# Patient Record
Sex: MALE | Race: WHITE | NOT HISPANIC OR LATINO | Employment: FULL TIME | URBAN - METROPOLITAN AREA
[De-identification: names, ages, dates, MRNs, and addresses within clinical notes are randomized per-mention and may not be internally consistent; named-entity substitution may affect disease eponyms.]

---

## 2017-03-31 LAB — EXTERNAL HIV SCREEN: NORMAL

## 2018-07-13 ENCOUNTER — TRANSCRIBE ORDERS (OUTPATIENT)
Dept: NEUROSURGERY | Facility: CLINIC | Age: 63
End: 2018-07-13

## 2018-07-13 DIAGNOSIS — M54.2 NECK PAIN: Primary | ICD-10-CM

## 2018-07-27 ENCOUNTER — OFFICE VISIT (OUTPATIENT)
Dept: NEUROSURGERY | Facility: CLINIC | Age: 63
End: 2018-07-27
Payer: COMMERCIAL

## 2018-07-27 VITALS
WEIGHT: 127 LBS | DIASTOLIC BLOOD PRESSURE: 97 MMHG | HEART RATE: 58 BPM | BODY MASS INDEX: 18.81 KG/M2 | SYSTOLIC BLOOD PRESSURE: 154 MMHG | TEMPERATURE: 99.5 F | HEIGHT: 69 IN | RESPIRATION RATE: 16 BRPM

## 2018-07-27 DIAGNOSIS — M54.2 NECK PAIN: ICD-10-CM

## 2018-07-27 DIAGNOSIS — G95.20 SPINAL CORD COMPRESSION (HCC): ICD-10-CM

## 2018-07-27 DIAGNOSIS — M47.12 OTHER SPONDYLOSIS WITH MYELOPATHY, CERVICAL REGION: ICD-10-CM

## 2018-07-27 DIAGNOSIS — G95.89 MYELOMALACIA (HCC): Primary | ICD-10-CM

## 2018-07-27 PROCEDURE — 99205 OFFICE O/P NEW HI 60 MIN: CPT | Performed by: NEUROLOGICAL SURGERY

## 2018-07-27 RX ORDER — ASPIRIN 325 MG
325 TABLET ORAL DAILY
COMMUNITY
End: 2018-08-23 | Stop reason: HOSPADM

## 2018-07-27 RX ORDER — ALLOPURINOL 100 MG/1
100 TABLET ORAL EVERY OTHER DAY
COMMUNITY
End: 2021-03-31

## 2018-07-27 RX ORDER — OMEPRAZOLE 20 MG/1
20 CAPSULE, DELAYED RELEASE ORAL AS NEEDED
COMMUNITY
End: 2020-09-16 | Stop reason: SDUPTHER

## 2018-07-27 RX ORDER — SOTALOL HYDROCHLORIDE 120 MG/1
120 TABLET ORAL 2 TIMES DAILY
COMMUNITY
End: 2020-05-29 | Stop reason: SDUPTHER

## 2018-07-27 RX ORDER — LOSARTAN POTASSIUM 100 MG/1
100 TABLET ORAL DAILY
COMMUNITY
End: 2020-05-29 | Stop reason: SDUPTHER

## 2018-07-27 NOTE — PROGRESS NOTES
Neurosurgery Office Note  Aleksey Gallo 66 y o  male MRN: [id number]      Assessment/Plan      Diagnoses and all orders for this visit:    Myelomalacia [facility])    Neck pain  -     Ambulatory referral to Neurosurgery    Spinal cord compression [facility])    Other spondylosis with myelopathy, cervical region    Other orders  -     allopurinol (ZYLOPRIM) 100 mg tablet; Take 100 mg by mouth daily  -     aspirin 325 mg tablet; Take 325 mg by mouth daily  -     losartan (COZAAR) 100 MG tablet; Take 100 mg by mouth daily  -     omeprazole (PriLOSEC) 20 mg delayed release capsule; Take 20 mg by mouth as needed  -     sotalol (BETAPACE) 120 mg tablet; Take 120 mg by mouth 2 (two) times a day      (above all pre-existing medications)    Discussion:    81-year-old male who since April feels he has been having worsening difficulty with his hands  Began with right hand tingling which has progressed, Now involves his left hand  He also has weakness in his arms, and cannot lift his arms/abduct his arms above 90 degrees  He has considerable difficulty with self care etc   His hands are the worst in terms of sensation, this is numbness and tingling, but involves all fingers and the distal arms  He also has great difficulty with his walking, has now progressed using a standard cane  He also mentions bladder difficulty i e  some mild hesitancy and decreased stream strength, nocturia 2 times nightly  Taken together, his mJOA score is 601777 or 9/17  He does not have profound neck pain, rating it 3-4/10  He feels these hand symptoms in particular are worsening quite acutely now  Other metrics, works full-time but stopped this week, arm pain 9-10/10, NDI 22 percent, EQ 5 D3 L to 2232  On exam, the patient has bilateral Salazar's, biceps reflexes are essentially normal, right patellar reflex is 3+, left 2+, plus-minus clonus on the right    Hand strength, interosseous 3/5, deltoids 4 to 4+/5 to 90 degree point, otherwise biceps triceps wrist extensors  all 4+ at least/5  Very unsteady on his feet, relies heavily on the cane, considerable falls risk  MRI scan of the cervical spine personally reviewed as was report  Shows myelomalacia at C3-4, with profound stenosis primarily from a posterior vector  He also is a herniated disc left side C5-6, but he has no profound radicular complaint left side as opposed to myelopathy  There is circumferential compression at the C5-6 disc space  I reviewed with the patient the natural history of cervical myelopathy  I reviewed him options for management  We spent considerable time discussing surgical options  I recommended offered and obtained consent for posterior cervical approach:  Posterior cervical laminectomy/decompression C3-5, superior C6, with fusion C2-C7, possible T1, additional levels as needed  I in detail reviewed with him expected hospital course, typically 3 days, PT evaluations, likely that he will need to be discharged to acute rehab, etc   Our office will connect with him and schedule him for surgery, unless he changes as mind  He takes 325 milligram aspirin for atrial fibrillation (did not want to take anti-coagulation), and this will need to be held for 1 week prior to surgery  He previously followed with Dr Abdulkadir De Los Santos of Cardiology, who retired  He will either need clearance from Dr Tin Weathers, or to be cleared by Cardiology if anesthesia/Dr Tin Weathers recommend this  He has a history of gout, has had no episodes for years  He does take allopurinol, which would also need to be held for surgery  Metrics as above  CHIEF COMPLAINT    Chief Complaint   Patient presents with    Consult     NP Consult for C-spine evaluation;  Referred by Dr Lucien Bowman    History of Present Illness     66y o  year old male     HPI    See Discussion    REVIEW OF SYSTEMS    Review of Systems   Constitutional: Positive for appetite change (not much appetite with pain and depression), fatigue (moderate) and unexpected weight change (lost 10 lbs in 3 months)  HENT: Negative  Eyes: Negative  Respiratory: Negative  Cardiovascular: Negative  Gastrointestinal: Positive for abdominal pain and diarrhea  Endocrine: Negative  Genitourinary: Negative  Musculoskeletal: Positive for arthralgias, back pain, gait problem (unstable balance, weakness in legs, using a standard cane, last fall October 2017), neck pain and neck stiffness  Skin: Positive for rash (dry itchy skin on legs)  Allergic/Immunologic: Negative  Neurological: Positive for weakness (BL upper and lower extremities) and numbness (B/L arms N/T)  Hematological: Bruises/bleeds easily  Psychiatric/Behavioral: Positive for decreased concentration (slight) and sleep disturbance (some)  The patient is nervous/anxious (nervous / depressed about how he feels and being alone)  Meds/Allergies     Current Outpatient Prescriptions   Medication Sig Dispense Refill    allopurinol (ZYLOPRIM) 100 mg tablet Take 100 mg by mouth daily      aspirin 325 mg tablet Take 325 mg by mouth daily      losartan (COZAAR) 100 MG tablet Take 100 mg by mouth daily      omeprazole (PriLOSEC) 20 mg delayed release capsule Take 20 mg by mouth as needed      sotalol (BETAPACE) 120 mg tablet Take 120 mg by mouth 2 (two) times a day       No current facility-administered medications for this visit          No Known Allergies    PAST HISTORY    Past Medical History:   Diagnosis Date    A-fib [facility]) 01/01/2005    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 03/10/2017    Anemia 12/31/2015    Back ache 08/31/2017    Diarrhea 08/31/2017    Diverticula of colon 01/01/2012    Family history of prostate cancer     Father    GERD with esophagitis 08/31/2017    Gout 08/31/2017    Hordeolum externum of lower eyelid 08/31/2017    Hyperlipidemia 05/01/2017    Hypertension, essential 05/01/2017    Hyponatremia 07/10/2014    Loss of taste 05/01/2017    Muscle strain 08/31/2017    Osteoarthritis     Paresthesia 06/08/2018    Raised prostate specific antigen 05/01/2017    Skin lesion 03/08/2018    Spinal stenosis of lumbar region 02/06/2017    Steatosis of liver 08/31/2017    Weight loss, unintentional 08/31/2017       Past Surgical History:   Procedure Laterality Date    CHEST TUBE INSERTION  1980    Insertion and removal / collapsed lung    OTHER SURGICAL HISTORY  03/10/2017    Abscess removed from L5 & S1     Chest tube for was for spontaneous pneumothorax in 1980  Social History   Substance Use Topics    Smoking status: Former Smoker     Types: Cigarettes     Quit date: 1980    Smokeless tobacco: Never Used    Alcohol use Yes      Comment: Beer       Family History   Problem Relation Age of Onset    Prostate cancer Father     Kidney disease Father     Pancreatic cancer Mother          Above history personally reviewed  EXAM    Vitals:Blood pressure 154/97, pulse 58, temperature 99 5 °F (37 5 °C), temperature source Tympanic, resp  rate 16, height 5' 9" (1 753 m), weight 57 6 kg (127 lb)  ,Body mass index is 18 75 kg/m²  Physical Exam   Constitutional: He is oriented to person, place, and time  He appears well-developed  Under weight   HENT:   Head: Normocephalic and atraumatic  Eyes: No scleral icterus  Neck: Neck supple  Excellent range of motion, extension, flexion, rotation  Cardiovascular: Normal rate  Pulmonary/Chest: Effort normal    Abdominal: Soft  Normal appearance  Musculoskeletal: Normal range of motion  Neurological: He is alert and oriented to person, place, and time  No sensory deficit  Reflex Scores:       Bicep reflexes are 1+ on the right side and 1+ on the left side  Patellar reflexes are 3+ on the right side and 2+ on the left side  Skin: Skin is warm and dry  Psychiatric: He has a normal mood and affect   His speech is normal and behavior is normal  Neurologic Exam     Mental Status   Oriented to person, place, and time  Attention: normal    Speech: speech is normal   Level of consciousness: alert    Cranial Nerves     CN VII   Facial expression full, symmetric  Motor Exam   Muscle bulk: normal  Overall muscle tone: normal  See discussion     Gait, Coordination, and Reflexes     Tremor   Resting tremor: absent  Intention tremor: absent  Action tremor: absent    Reflexes   Right biceps: 1+  Left biceps: 1+  Right patellar: 3+  Left patellar: 2+  Right Salazar: present  Left Salazar: present  Right ankle clonus: present  Left ankle clonus: absent  Standard cane         MEDICAL DECISION MAKING    Imaging Studies:     Uploaded MRI scan cervical spine from [facility], done 7/11/18: I have personally reviewed pertinent reports     and I have personally reviewed pertinent films in PACS

## 2018-08-01 PROBLEM — G95.89 MYELOMALACIA (HCC): Status: ACTIVE | Noted: 2018-08-01

## 2018-08-01 PROBLEM — G95.20 SPINAL CORD COMPRESSION (HCC): Status: ACTIVE | Noted: 2018-08-01

## 2018-08-01 PROBLEM — M47.12 OTHER SPONDYLOSIS WITH MYELOPATHY, CERVICAL REGION: Status: ACTIVE | Noted: 2018-08-01

## 2018-08-03 ENCOUNTER — TELEPHONE (OUTPATIENT)
Dept: PREADMISSION TESTING | Facility: HOSPITAL | Age: 63
End: 2018-08-03

## 2018-08-10 ENCOUNTER — APPOINTMENT (OUTPATIENT)
Dept: LAB | Facility: CLINIC | Age: 63
End: 2018-08-10
Payer: COMMERCIAL

## 2018-08-10 ENCOUNTER — TRANSCRIBE ORDERS (OUTPATIENT)
Dept: LAB | Facility: CLINIC | Age: 63
End: 2018-08-10

## 2018-08-10 DIAGNOSIS — M47.12 OTHER SPONDYLOSIS WITH MYELOPATHY, CERVICAL REGION: ICD-10-CM

## 2018-08-10 DIAGNOSIS — G95.89 MYELOMALACIA (HCC): ICD-10-CM

## 2018-08-10 DIAGNOSIS — M54.2 NECK PAIN: ICD-10-CM

## 2018-08-10 DIAGNOSIS — G95.20 SPINAL CORD COMPRESSION (HCC): ICD-10-CM

## 2018-08-10 LAB
ABO GROUP BLD: NORMAL
ALBUMIN SERPL BCP-MCNC: 4 G/DL (ref 3.5–5)
ALP SERPL-CCNC: 87 U/L (ref 46–116)
ALT SERPL W P-5'-P-CCNC: 32 U/L (ref 12–78)
ANION GAP SERPL CALCULATED.3IONS-SCNC: 8 MMOL/L (ref 4–13)
APTT PPP: 28 SECONDS (ref 24–36)
AST SERPL W P-5'-P-CCNC: 30 U/L (ref 5–45)
BASOPHILS # BLD AUTO: 0.05 THOUSANDS/ΜL (ref 0–0.1)
BASOPHILS NFR BLD AUTO: 1 % (ref 0–1)
BILIRUB SERPL-MCNC: 1.06 MG/DL (ref 0.2–1)
BILIRUB UR QL STRIP: NEGATIVE
BLD GP AB SCN SERPL QL: NEGATIVE
BUN SERPL-MCNC: 7 MG/DL (ref 5–25)
CALCIUM SERPL-MCNC: 9.3 MG/DL (ref 8.3–10.1)
CHLORIDE SERPL-SCNC: 92 MMOL/L (ref 100–108)
CLARITY UR: CLEAR
CO2 SERPL-SCNC: 26 MMOL/L (ref 21–32)
COLOR UR: YELLOW
CREAT SERPL-MCNC: 0.73 MG/DL (ref 0.6–1.3)
EOSINOPHIL # BLD AUTO: 0.06 THOUSAND/ΜL (ref 0–0.61)
EOSINOPHIL NFR BLD AUTO: 1 % (ref 0–6)
ERYTHROCYTE [DISTWIDTH] IN BLOOD BY AUTOMATED COUNT: 11.9 % (ref 11.6–15.1)
EST. AVERAGE GLUCOSE BLD GHB EST-MCNC: 91 MG/DL
GFR SERPL CREATININE-BSD FRML MDRD: 100 ML/MIN/1.73SQ M
GLUCOSE P FAST SERPL-MCNC: 111 MG/DL (ref 65–99)
GLUCOSE UR STRIP-MCNC: NEGATIVE MG/DL
HBA1C MFR BLD: 4.8 % (ref 4.2–6.3)
HCT VFR BLD AUTO: 46.4 % (ref 36.5–49.3)
HGB BLD-MCNC: 15.4 G/DL (ref 12–17)
HGB UR QL STRIP.AUTO: NEGATIVE
IMM GRANULOCYTES # BLD AUTO: 0.02 THOUSAND/UL (ref 0–0.2)
IMM GRANULOCYTES NFR BLD AUTO: 0 % (ref 0–2)
INR PPP: 0.97 (ref 0.86–1.17)
KETONES UR STRIP-MCNC: NEGATIVE MG/DL
LEUKOCYTE ESTERASE UR QL STRIP: NEGATIVE
LYMPHOCYTES # BLD AUTO: 1.1 THOUSANDS/ΜL (ref 0.6–4.47)
LYMPHOCYTES NFR BLD AUTO: 17 % (ref 14–44)
MCH RBC QN AUTO: 31.8 PG (ref 26.8–34.3)
MCHC RBC AUTO-ENTMCNC: 33.2 G/DL (ref 31.4–37.4)
MCV RBC AUTO: 96 FL (ref 82–98)
MONOCYTES # BLD AUTO: 0.65 THOUSAND/ΜL (ref 0.17–1.22)
MONOCYTES NFR BLD AUTO: 10 % (ref 4–12)
NEUTROPHILS # BLD AUTO: 4.68 THOUSANDS/ΜL (ref 1.85–7.62)
NEUTS SEG NFR BLD AUTO: 71 % (ref 43–75)
NITRITE UR QL STRIP: NEGATIVE
NRBC BLD AUTO-RTO: 0 /100 WBCS
PH UR STRIP.AUTO: 7 [PH] (ref 4.5–8)
PLATELET # BLD AUTO: 232 THOUSANDS/UL (ref 149–390)
PMV BLD AUTO: 10.8 FL (ref 8.9–12.7)
POTASSIUM SERPL-SCNC: 4.2 MMOL/L (ref 3.5–5.3)
PROT SERPL-MCNC: 7.7 G/DL (ref 6.4–8.2)
PROT UR STRIP-MCNC: NEGATIVE MG/DL
PROTHROMBIN TIME: 13 SECONDS (ref 11.8–14.2)
RBC # BLD AUTO: 4.84 MILLION/UL (ref 3.88–5.62)
RH BLD: POSITIVE
SODIUM SERPL-SCNC: 126 MMOL/L (ref 136–145)
SP GR UR STRIP.AUTO: 1.02 (ref 1–1.03)
SPECIMEN EXPIRATION DATE: NORMAL
UROBILINOGEN UR QL STRIP.AUTO: 0.2 E.U./DL
WBC # BLD AUTO: 6.56 THOUSAND/UL (ref 4.31–10.16)

## 2018-08-10 PROCEDURE — 80053 COMPREHEN METABOLIC PANEL: CPT

## 2018-08-10 PROCEDURE — 83036 HEMOGLOBIN GLYCOSYLATED A1C: CPT

## 2018-08-10 PROCEDURE — 86901 BLOOD TYPING SEROLOGIC RH(D): CPT

## 2018-08-10 PROCEDURE — 86850 RBC ANTIBODY SCREEN: CPT

## 2018-08-10 PROCEDURE — 81003 URINALYSIS AUTO W/O SCOPE: CPT | Performed by: NEUROLOGICAL SURGERY

## 2018-08-10 PROCEDURE — 85730 THROMBOPLASTIN TIME PARTIAL: CPT

## 2018-08-10 PROCEDURE — 85025 COMPLETE CBC W/AUTO DIFF WBC: CPT

## 2018-08-10 PROCEDURE — 36415 COLL VENOUS BLD VENIPUNCTURE: CPT

## 2018-08-10 PROCEDURE — 86900 BLOOD TYPING SEROLOGIC ABO: CPT

## 2018-08-10 PROCEDURE — 85610 PROTHROMBIN TIME: CPT

## 2018-08-15 ENCOUNTER — TELEPHONE (OUTPATIENT)
Dept: NEUROSURGERY | Facility: CLINIC | Age: 63
End: 2018-08-15

## 2018-08-15 ENCOUNTER — DOCUMENTATION (OUTPATIENT)
Dept: NEUROSURGERY | Facility: CLINIC | Age: 63
End: 2018-08-15

## 2018-08-15 ENCOUNTER — ANESTHESIA EVENT (OUTPATIENT)
Dept: PERIOP | Facility: HOSPITAL | Age: 63
DRG: 321 | End: 2018-08-15
Payer: COMMERCIAL

## 2018-08-15 NOTE — TELEPHONE ENCOUNTER
Spoke with Charles Mahoney, that is scheduled for surgery tomorrow, 08/16/18   Verified allergies and went over pre-op instructions, including bathing and NPO status  Patient was not scheduled for surgery when he was at his OV, so patient was not given soap and wipes from our office  He was able to p/u hibiclens from his local pharmacy and knows how to use it  Patient currently denies taking any blood thinning medications  Answered any questions he may have at this time  Patient was appreciative for the call

## 2018-08-16 ENCOUNTER — ANESTHESIA (OUTPATIENT)
Dept: PERIOP | Facility: HOSPITAL | Age: 63
DRG: 321 | End: 2018-08-16
Payer: COMMERCIAL

## 2018-08-16 ENCOUNTER — HOSPITAL ENCOUNTER (INPATIENT)
Facility: HOSPITAL | Age: 63
LOS: 7 days | Discharge: NURSING, REHAB OR LONG-TERM CARE | DRG: 321 | End: 2018-08-23
Attending: NEUROLOGICAL SURGERY | Admitting: NEUROLOGICAL SURGERY
Payer: COMMERCIAL

## 2018-08-16 ENCOUNTER — APPOINTMENT (OUTPATIENT)
Dept: RADIOLOGY | Facility: HOSPITAL | Age: 63
DRG: 321 | End: 2018-08-16
Payer: COMMERCIAL

## 2018-08-16 DIAGNOSIS — K59.03 DRUG-INDUCED CONSTIPATION: ICD-10-CM

## 2018-08-16 DIAGNOSIS — Z48.89 AFTERCARE FOLLOWING SURGERY: ICD-10-CM

## 2018-08-16 DIAGNOSIS — G95.20 SPINAL CORD COMPRESSION (HCC): ICD-10-CM

## 2018-08-16 DIAGNOSIS — E87.1 HYPONATREMIA: ICD-10-CM

## 2018-08-16 DIAGNOSIS — M47.12 OTHER SPONDYLOSIS WITH MYELOPATHY, CERVICAL REGION: ICD-10-CM

## 2018-08-16 DIAGNOSIS — D62 ACUTE BLOOD LOSS ANEMIA: ICD-10-CM

## 2018-08-16 DIAGNOSIS — G95.89 MYELOMALACIA (HCC): Primary | ICD-10-CM

## 2018-08-16 LAB
ANION GAP SERPL CALCULATED.3IONS-SCNC: 11 MMOL/L (ref 4–13)
BUN SERPL-MCNC: 6 MG/DL (ref 5–25)
CALCIUM SERPL-MCNC: 9.2 MG/DL (ref 8.3–10.1)
CHLORIDE SERPL-SCNC: 95 MMOL/L (ref 100–108)
CO2 SERPL-SCNC: 23 MMOL/L (ref 21–32)
CREAT SERPL-MCNC: 0.63 MG/DL (ref 0.6–1.3)
GFR SERPL CREATININE-BSD FRML MDRD: 106 ML/MIN/1.73SQ M
GLUCOSE P FAST SERPL-MCNC: 101 MG/DL (ref 65–99)
GLUCOSE SERPL-MCNC: 101 MG/DL (ref 65–140)
GLUCOSE SERPL-MCNC: 149 MG/DL (ref 65–140)
POTASSIUM SERPL-SCNC: 3.9 MMOL/L (ref 3.5–5.3)
SODIUM SERPL-SCNC: 129 MMOL/L (ref 136–145)

## 2018-08-16 PROCEDURE — 0RG4071 FUSION OF CERVICOTHORACIC VERTEBRAL JOINT WITH AUTOLOGOUS TISSUE SUBSTITUTE, POSTERIOR APPROACH, POSTERIOR COLUMN, OPEN APPROACH: ICD-10-PCS | Performed by: NEUROLOGICAL SURGERY

## 2018-08-16 PROCEDURE — 72040 X-RAY EXAM NECK SPINE 2-3 VW: CPT

## 2018-08-16 PROCEDURE — 22843 INSERT SPINE FIXATION DEVICE: CPT | Performed by: NEUROLOGICAL SURGERY

## 2018-08-16 PROCEDURE — 95940 IONM IN OPERATNG ROOM 15 MIN: CPT | Performed by: NEUROLOGICAL SURGERY

## 2018-08-16 PROCEDURE — C1713 ANCHOR/SCREW BN/BN,TIS/BN: HCPCS | Performed by: NEUROLOGICAL SURGERY

## 2018-08-16 PROCEDURE — 20936 SP BONE AGRFT LOCAL ADD-ON: CPT | Performed by: NEUROLOGICAL SURGERY

## 2018-08-16 PROCEDURE — 87081 CULTURE SCREEN ONLY: CPT | Performed by: PHYSICIAN ASSISTANT

## 2018-08-16 PROCEDURE — 82948 REAGENT STRIP/BLOOD GLUCOSE: CPT

## 2018-08-16 PROCEDURE — 00NW0ZZ RELEASE CERVICAL SPINAL CORD, OPEN APPROACH: ICD-10-PCS | Performed by: NEUROLOGICAL SURGERY

## 2018-08-16 PROCEDURE — 63015 REMOVE SPINE LAMINA >2 CRVCL: CPT | Performed by: NEUROLOGICAL SURGERY

## 2018-08-16 PROCEDURE — 22600 ARTHRD PST TQ 1NTRSPC CRV: CPT | Performed by: NEUROLOGICAL SURGERY

## 2018-08-16 PROCEDURE — 20930 SP BONE ALGRFT MORSEL ADD-ON: CPT | Performed by: NEUROLOGICAL SURGERY

## 2018-08-16 PROCEDURE — 22614 ARTHRD PST TQ 1NTRSPC EA ADD: CPT | Performed by: NEUROLOGICAL SURGERY

## 2018-08-16 PROCEDURE — 80048 BASIC METABOLIC PNL TOTAL CA: CPT | Performed by: PHYSICIAN ASSISTANT

## 2018-08-16 PROCEDURE — 0RG1071 FUSION OF CERVICAL VERTEBRAL JOINT WITH AUTOLOGOUS TISSUE SUBSTITUTE, POSTERIOR APPROACH, POSTERIOR COLUMN, OPEN APPROACH: ICD-10-PCS | Performed by: NEUROLOGICAL SURGERY

## 2018-08-16 DEVICE — DBM 006010 PROGENIX PLUS 10CC
Type: IMPLANTABLE DEVICE | Site: POSTERIOR CERVICAL | Status: FUNCTIONAL
Brand: PROGENIX® PUTTY AND PROGENIX® PLUS

## 2018-08-16 DEVICE — SCREW 6958928 4.5 X 28MM MAS
Type: IMPLANTABLE DEVICE | Site: POSTERIOR CERVICAL | Status: FUNCTIONAL
Brand: VERTEX® RECONSTRUCTION SYSTEM

## 2018-08-16 RX ORDER — FENTANYL CITRATE 50 UG/ML
INJECTION, SOLUTION INTRAMUSCULAR; INTRAVENOUS AS NEEDED
Status: DISCONTINUED | OUTPATIENT
Start: 2018-08-16 | End: 2018-08-16 | Stop reason: SURG

## 2018-08-16 RX ORDER — LOSARTAN POTASSIUM 50 MG/1
100 TABLET ORAL DAILY
Status: DISCONTINUED | OUTPATIENT
Start: 2018-08-17 | End: 2018-08-23 | Stop reason: HOSPADM

## 2018-08-16 RX ORDER — ONDANSETRON 2 MG/ML
4 INJECTION INTRAMUSCULAR; INTRAVENOUS EVERY 6 HOURS PRN
Status: DISCONTINUED | OUTPATIENT
Start: 2018-08-16 | End: 2018-08-23 | Stop reason: HOSPADM

## 2018-08-16 RX ORDER — OXYCODONE HYDROCHLORIDE 5 MG/1
10 TABLET ORAL EVERY 4 HOURS PRN
Status: DISCONTINUED | OUTPATIENT
Start: 2018-08-16 | End: 2018-08-23 | Stop reason: HOSPADM

## 2018-08-16 RX ORDER — METOCLOPRAMIDE HYDROCHLORIDE 5 MG/ML
10 INJECTION INTRAMUSCULAR; INTRAVENOUS ONCE AS NEEDED
Status: DISCONTINUED | OUTPATIENT
Start: 2018-08-16 | End: 2018-08-16 | Stop reason: HOSPADM

## 2018-08-16 RX ORDER — ONDANSETRON 2 MG/ML
INJECTION INTRAMUSCULAR; INTRAVENOUS AS NEEDED
Status: DISCONTINUED | OUTPATIENT
Start: 2018-08-16 | End: 2018-08-16 | Stop reason: SURG

## 2018-08-16 RX ORDER — LABETALOL HYDROCHLORIDE 5 MG/ML
INJECTION, SOLUTION INTRAVENOUS AS NEEDED
Status: DISCONTINUED | OUTPATIENT
Start: 2018-08-16 | End: 2018-08-16 | Stop reason: SURG

## 2018-08-16 RX ORDER — SUCCINYLCHOLINE CHLORIDE 20 MG/ML
INJECTION INTRAMUSCULAR; INTRAVENOUS AS NEEDED
Status: DISCONTINUED | OUTPATIENT
Start: 2018-08-16 | End: 2018-08-16 | Stop reason: SURG

## 2018-08-16 RX ORDER — CHLORHEXIDINE GLUCONATE 0.12 MG/ML
15 RINSE ORAL ONCE
Status: COMPLETED | OUTPATIENT
Start: 2018-08-16 | End: 2018-08-16

## 2018-08-16 RX ORDER — SOTALOL HYDROCHLORIDE 120 MG/1
120 TABLET ORAL 2 TIMES DAILY
Status: DISCONTINUED | OUTPATIENT
Start: 2018-08-16 | End: 2018-08-23 | Stop reason: HOSPADM

## 2018-08-16 RX ORDER — PROPOFOL 10 MG/ML
INJECTION, EMULSION INTRAVENOUS AS NEEDED
Status: DISCONTINUED | OUTPATIENT
Start: 2018-08-16 | End: 2018-08-16 | Stop reason: SURG

## 2018-08-16 RX ORDER — EPHEDRINE SULFATE 50 MG/ML
INJECTION, SOLUTION INTRAVENOUS AS NEEDED
Status: DISCONTINUED | OUTPATIENT
Start: 2018-08-16 | End: 2018-08-16 | Stop reason: SURG

## 2018-08-16 RX ORDER — AMOXICILLIN 250 MG
2 CAPSULE ORAL DAILY
Status: DISCONTINUED | OUTPATIENT
Start: 2018-08-17 | End: 2018-08-23 | Stop reason: HOSPADM

## 2018-08-16 RX ORDER — BUPIVACAINE HYDROCHLORIDE AND EPINEPHRINE 5; 5 MG/ML; UG/ML
INJECTION, SOLUTION EPIDURAL; INTRACAUDAL; PERINEURAL AS NEEDED
Status: DISCONTINUED | OUTPATIENT
Start: 2018-08-16 | End: 2018-08-16 | Stop reason: HOSPADM

## 2018-08-16 RX ORDER — METHOCARBAMOL 750 MG/1
750 TABLET, FILM COATED ORAL 3 TIMES DAILY PRN
Refills: 0 | COMMUNITY
Start: 2018-05-11 | End: 2019-08-23 | Stop reason: ALTCHOICE

## 2018-08-16 RX ORDER — ACETAMINOPHEN 325 MG/1
650 TABLET ORAL EVERY 4 HOURS PRN
Status: DISCONTINUED | OUTPATIENT
Start: 2018-08-16 | End: 2018-08-23 | Stop reason: HOSPADM

## 2018-08-16 RX ORDER — SODIUM CHLORIDE 9 MG/ML
100 INJECTION, SOLUTION INTRAVENOUS CONTINUOUS
Status: DISCONTINUED | OUTPATIENT
Start: 2018-08-16 | End: 2018-08-17

## 2018-08-16 RX ORDER — ACETAMINOPHEN 325 MG/1
975 TABLET ORAL EVERY 8 HOURS SCHEDULED
Status: DISCONTINUED | OUTPATIENT
Start: 2018-08-16 | End: 2018-08-23 | Stop reason: HOSPADM

## 2018-08-16 RX ORDER — PREGABALIN 75 MG/1
150 CAPSULE ORAL ONCE
Status: COMPLETED | OUTPATIENT
Start: 2018-08-16 | End: 2018-08-16

## 2018-08-16 RX ORDER — SODIUM CHLORIDE 9 MG/ML
125 INJECTION, SOLUTION INTRAVENOUS CONTINUOUS
Status: DISCONTINUED | OUTPATIENT
Start: 2018-08-16 | End: 2018-08-16

## 2018-08-16 RX ORDER — SODIUM CHLORIDE, SODIUM LACTATE, POTASSIUM CHLORIDE, CALCIUM CHLORIDE 600; 310; 30; 20 MG/100ML; MG/100ML; MG/100ML; MG/100ML
INJECTION, SOLUTION INTRAVENOUS CONTINUOUS PRN
Status: DISCONTINUED | OUTPATIENT
Start: 2018-08-16 | End: 2018-08-16 | Stop reason: SURG

## 2018-08-16 RX ORDER — ROCURONIUM BROMIDE 10 MG/ML
INJECTION, SOLUTION INTRAVENOUS AS NEEDED
Status: DISCONTINUED | OUTPATIENT
Start: 2018-08-16 | End: 2018-08-16 | Stop reason: SURG

## 2018-08-16 RX ORDER — ACETAMINOPHEN 325 MG/1
975 TABLET ORAL ONCE
Status: COMPLETED | OUTPATIENT
Start: 2018-08-16 | End: 2018-08-16

## 2018-08-16 RX ORDER — MIDAZOLAM HYDROCHLORIDE 1 MG/ML
INJECTION INTRAMUSCULAR; INTRAVENOUS AS NEEDED
Status: DISCONTINUED | OUTPATIENT
Start: 2018-08-16 | End: 2018-08-16 | Stop reason: SURG

## 2018-08-16 RX ORDER — GLYCOPYRROLATE 0.2 MG/ML
INJECTION INTRAMUSCULAR; INTRAVENOUS AS NEEDED
Status: DISCONTINUED | OUTPATIENT
Start: 2018-08-16 | End: 2018-08-16 | Stop reason: SURG

## 2018-08-16 RX ORDER — METHOCARBAMOL 500 MG/1
750 TABLET, FILM COATED ORAL 4 TIMES DAILY PRN
Status: DISCONTINUED | OUTPATIENT
Start: 2018-08-16 | End: 2018-08-23 | Stop reason: HOSPADM

## 2018-08-16 RX ORDER — OXYCODONE HYDROCHLORIDE 5 MG/1
5 TABLET ORAL EVERY 4 HOURS PRN
Status: DISCONTINUED | OUTPATIENT
Start: 2018-08-16 | End: 2018-08-23 | Stop reason: HOSPADM

## 2018-08-16 RX ORDER — PROPOFOL 10 MG/ML
INJECTION, EMULSION INTRAVENOUS CONTINUOUS PRN
Status: DISCONTINUED | OUTPATIENT
Start: 2018-08-16 | End: 2018-08-16 | Stop reason: SURG

## 2018-08-16 RX ORDER — LIDOCAINE HYDROCHLORIDE 10 MG/ML
INJECTION, SOLUTION INFILTRATION; PERINEURAL AS NEEDED
Status: DISCONTINUED | OUTPATIENT
Start: 2018-08-16 | End: 2018-08-16 | Stop reason: SURG

## 2018-08-16 RX ORDER — LIDOCAINE HYDROCHLORIDE AND EPINEPHRINE 10; 10 MG/ML; UG/ML
INJECTION, SOLUTION INFILTRATION; PERINEURAL AS NEEDED
Status: DISCONTINUED | OUTPATIENT
Start: 2018-08-16 | End: 2018-08-16 | Stop reason: HOSPADM

## 2018-08-16 RX ORDER — PANTOPRAZOLE SODIUM 40 MG/1
40 TABLET, DELAYED RELEASE ORAL
Status: DISCONTINUED | OUTPATIENT
Start: 2018-08-17 | End: 2018-08-23 | Stop reason: HOSPADM

## 2018-08-16 RX ORDER — FENTANYL CITRATE/PF 50 MCG/ML
50 SYRINGE (ML) INJECTION
Status: COMPLETED | OUTPATIENT
Start: 2018-08-16 | End: 2018-08-16

## 2018-08-16 RX ADMIN — EPHEDRINE SULFATE 5 MG: 50 INJECTION, SOLUTION INTRAMUSCULAR; INTRAVENOUS; SUBCUTANEOUS at 08:20

## 2018-08-16 RX ADMIN — PREGABALIN 150 MG: 75 CAPSULE ORAL at 06:07

## 2018-08-16 RX ADMIN — LABETALOL HYDROCHLORIDE 5 MG: 5 INJECTION, SOLUTION INTRAVENOUS at 09:06

## 2018-08-16 RX ADMIN — FENTANYL CITRATE 50 MCG: 50 INJECTION INTRAMUSCULAR; INTRAVENOUS at 13:21

## 2018-08-16 RX ADMIN — LABETALOL HYDROCHLORIDE 5 MG: 5 INJECTION, SOLUTION INTRAVENOUS at 09:02

## 2018-08-16 RX ADMIN — ACETAMINOPHEN 975 MG: 325 TABLET ORAL at 21:51

## 2018-08-16 RX ADMIN — Medication 0.2 MCG/KG/MIN: at 08:29

## 2018-08-16 RX ADMIN — SODIUM CHLORIDE: 0.9 INJECTION, SOLUTION INTRAVENOUS at 07:59

## 2018-08-16 RX ADMIN — EPHEDRINE SULFATE 10 MG: 50 INJECTION, SOLUTION INTRAMUSCULAR; INTRAVENOUS; SUBCUTANEOUS at 08:28

## 2018-08-16 RX ADMIN — Medication 0.2 MCG/KG/HR: at 08:29

## 2018-08-16 RX ADMIN — ONDANSETRON 4 MG: 2 INJECTION INTRAMUSCULAR; INTRAVENOUS at 11:40

## 2018-08-16 RX ADMIN — ROCURONIUM BROMIDE 20 MG: 10 INJECTION INTRAVENOUS at 09:04

## 2018-08-16 RX ADMIN — FENTANYL CITRATE 50 MCG: 50 INJECTION, SOLUTION INTRAMUSCULAR; INTRAVENOUS at 07:49

## 2018-08-16 RX ADMIN — CHLORHEXIDINE GLUCONATE 15 ML: 1.2 RINSE ORAL at 06:06

## 2018-08-16 RX ADMIN — CEFAZOLIN SODIUM 2000 MG: 2 SOLUTION INTRAVENOUS at 08:37

## 2018-08-16 RX ADMIN — MIDAZOLAM 2 MG: 1 INJECTION INTRAMUSCULAR; INTRAVENOUS at 07:42

## 2018-08-16 RX ADMIN — GLYCOPYRROLATE 0.4 MG: 0.2 INJECTION, SOLUTION INTRAMUSCULAR; INTRAVENOUS at 12:02

## 2018-08-16 RX ADMIN — DEXAMETHASONE SODIUM PHOSPHATE 10 MG: 10 INJECTION INTRAMUSCULAR; INTRAVENOUS at 08:41

## 2018-08-16 RX ADMIN — SODIUM CHLORIDE, SODIUM LACTATE, POTASSIUM CHLORIDE, AND CALCIUM CHLORIDE: .6; .31; .03; .02 INJECTION, SOLUTION INTRAVENOUS at 07:42

## 2018-08-16 RX ADMIN — FENTANYL CITRATE 50 MCG: 50 INJECTION INTRAMUSCULAR; INTRAVENOUS at 12:59

## 2018-08-16 RX ADMIN — LABETALOL HYDROCHLORIDE 5 MG: 5 INJECTION, SOLUTION INTRAVENOUS at 12:07

## 2018-08-16 RX ADMIN — PROPOFOL 200 MG: 10 INJECTION, EMULSION INTRAVENOUS at 07:49

## 2018-08-16 RX ADMIN — SUCCINYLCHOLINE CHLORIDE 100 MG: 20 INJECTION, SOLUTION INTRAMUSCULAR; INTRAVENOUS at 07:52

## 2018-08-16 RX ADMIN — LABETALOL HYDROCHLORIDE 5 MG: 5 INJECTION, SOLUTION INTRAVENOUS at 12:10

## 2018-08-16 RX ADMIN — PROPOFOL 50 MG: 10 INJECTION, EMULSION INTRAVENOUS at 08:30

## 2018-08-16 RX ADMIN — SODIUM CHLORIDE 100 ML/HR: 0.9 INJECTION, SOLUTION INTRAVENOUS at 15:09

## 2018-08-16 RX ADMIN — PROPOFOL 50 MG: 10 INJECTION, EMULSION INTRAVENOUS at 08:50

## 2018-08-16 RX ADMIN — NEOSTIGMINE METHYLSULFATE 2 MG: 1 INJECTION, SOLUTION INTRAMUSCULAR; INTRAVENOUS; SUBCUTANEOUS at 12:02

## 2018-08-16 RX ADMIN — CEFAZOLIN SODIUM 1000 MG: 1 SOLUTION INTRAVENOUS at 19:19

## 2018-08-16 RX ADMIN — SUCCINYLCHOLINE CHLORIDE 100 MG: 20 INJECTION, SOLUTION INTRAMUSCULAR; INTRAVENOUS at 07:49

## 2018-08-16 RX ADMIN — PROPOFOL 100 MCG/KG/MIN: 10 INJECTION, EMULSION INTRAVENOUS at 08:29

## 2018-08-16 RX ADMIN — LIDOCAINE HYDROCHLORIDE 50 MG: 10 INJECTION, SOLUTION INFILTRATION; PERINEURAL at 07:49

## 2018-08-16 RX ADMIN — ACETAMINOPHEN 975 MG: 325 TABLET, FILM COATED ORAL at 06:07

## 2018-08-16 RX ADMIN — LABETALOL HYDROCHLORIDE 10 MG: 5 INJECTION, SOLUTION INTRAVENOUS at 08:54

## 2018-08-16 RX ADMIN — FENTANYL CITRATE 50 MCG: 50 INJECTION, SOLUTION INTRAMUSCULAR; INTRAVENOUS at 08:45

## 2018-08-16 RX ADMIN — EPHEDRINE SULFATE 5 MG: 50 INJECTION, SOLUTION INTRAMUSCULAR; INTRAVENOUS; SUBCUTANEOUS at 08:15

## 2018-08-16 RX ADMIN — FENTANYL CITRATE 50 MCG: 50 INJECTION INTRAMUSCULAR; INTRAVENOUS at 12:47

## 2018-08-16 RX ADMIN — FENTANYL CITRATE 50 MCG: 50 INJECTION INTRAMUSCULAR; INTRAVENOUS at 13:34

## 2018-08-16 RX ADMIN — SOTALOL HYDROCHLORIDE 120 MG: 120 TABLET ORAL at 21:51

## 2018-08-16 RX ADMIN — PROPOFOL 50 MG: 10 INJECTION, EMULSION INTRAVENOUS at 08:40

## 2018-08-16 NOTE — ANESTHESIA POSTPROCEDURE EVALUATION
Post-Op Assessment Note      CV Status:  Stable    Mental Status:  Alert and awake    Hydration Status:  Euvolemic    PONV Controlled:  Controlled    Airway Patency:  Patent    Post Op Vitals Reviewed: Yes          Staff: Anesthesiologist, CRNA           BP      Temp (!) 97 2 °F (36 2 °C) (08/16/18 1228)    Pulse 61 (08/16/18 1228)   Resp      SpO2

## 2018-08-16 NOTE — ANESTHESIA PREPROCEDURE EVALUATION
Review of Systems/Medical History  Patient summary reviewed  Chart reviewed  No history of anesthetic complications     Cardiovascular  EKG reviewed, Exercise tolerance (METS): <4,  Hyperlipidemia, Hypertension controlled, Dysrhythmias , atrial fibrillation,    Pulmonary       GI/Hepatic    GERD well controlled, Liver disease ,             Endo/Other     GYN       Hematology   Musculoskeletal  Back pain , cervical pain and lumbar pain,   Arthritis     Neurology   Psychology   Anxiety,              Physical Exam    Airway    Mallampati score: I  TM Distance: >3 FB  Neck ROM: full     Dental   No notable dental hx     Cardiovascular      Pulmonary      Other Findings        Anesthesia Plan  ASA Score- 3     Anesthesia Type- general with ASA Monitors  Additional Monitors: arterial line  Airway Plan: ETT  Plan Factors-    Induction- intravenous  Postoperative Plan- Plan for postoperative opioid use  Planned trial extubation    Informed Consent- Anesthetic plan and risks discussed with patient  I personally reviewed this patient with the CRNA  Discussed and agreed on the Anesthesia Plan with the CRNA  Jeff Hernández

## 2018-08-16 NOTE — H&P (VIEW-ONLY)
Neurosurgery Office Note  Christofer Shelton 66 y o  male MRN: [id number]      Assessment/Plan      Diagnoses and all orders for this visit:    Myelomalacia [facility])    Neck pain  -     Ambulatory referral to Neurosurgery    Spinal cord compression [facility])    Other spondylosis with myelopathy, cervical region    Other orders  -     allopurinol (ZYLOPRIM) 100 mg tablet; Take 100 mg by mouth daily  -     aspirin 325 mg tablet; Take 325 mg by mouth daily  -     losartan (COZAAR) 100 MG tablet; Take 100 mg by mouth daily  -     omeprazole (PriLOSEC) 20 mg delayed release capsule; Take 20 mg by mouth as needed  -     sotalol (BETAPACE) 120 mg tablet; Take 120 mg by mouth 2 (two) times a day      (above all pre-existing medications)    Discussion:    62-year-old male who since April feels he has been having worsening difficulty with his hands  Began with right hand tingling which has progressed, Now involves his left hand  He also has weakness in his arms, and cannot lift his arms/abduct his arms above 90 degrees  He has considerable difficulty with self care etc   His hands are the worst in terms of sensation, this is numbness and tingling, but involves all fingers and the distal arms  He also has great difficulty with his walking, has now progressed using a standard cane  He also mentions bladder difficulty i e  some mild hesitancy and decreased stream strength, nocturia 2 times nightly  Taken together, his mJOA score is 070898 or 9/17  He does not have profound neck pain, rating it 3-4/10  He feels these hand symptoms in particular are worsening quite acutely now  Other metrics, works full-time but stopped this week, arm pain 9-10/10, NDI 22 percent, EQ 5 D3 L to 2232  On exam, the patient has bilateral Salazar's, biceps reflexes are essentially normal, right patellar reflex is 3+, left 2+, plus-minus clonus on the right    Hand strength, interosseous 3/5, deltoids 4 to 4+/5 to 90 degree point, otherwise biceps triceps wrist extensors  all 4+ at least/5  Very unsteady on his feet, relies heavily on the cane, considerable falls risk  MRI scan of the cervical spine personally reviewed as was report  Shows myelomalacia at C3-4, with profound stenosis primarily from a posterior vector  He also is a herniated disc left side C5-6, but he has no profound radicular complaint left side as opposed to myelopathy  There is circumferential compression at the C5-6 disc space  I reviewed with the patient the natural history of cervical myelopathy  I reviewed him options for management  We spent considerable time discussing surgical options  I recommended offered and obtained consent for posterior cervical approach:  Posterior cervical laminectomy/decompression C3-5, superior C6, with fusion C2-C7, possible T1, additional levels as needed  I in detail reviewed with him expected hospital course, typically 3 days, PT evaluations, likely that he will need to be discharged to acute rehab, etc   Our office will connect with him and schedule him for surgery, unless he changes as mind  He takes 325 milligram aspirin for atrial fibrillation (did not want to take anti-coagulation), and this will need to be held for 1 week prior to surgery  He previously followed with Dr Marck Rahman of Cardiology, who retired  He will either need clearance from Dr Sathya Arndt, or to be cleared by Cardiology if anesthesia/Dr Sathya Arndt recommend this  He has a history of gout, has had no episodes for years  He does take allopurinol, which would also need to be held for surgery  Metrics as above  CHIEF COMPLAINT    Chief Complaint   Patient presents with    Consult     NP Consult for C-spine evaluation;  Referred by Dr Claudia Vega    History of Present Illness     66y o  year old male     HPI    See Discussion    REVIEW OF SYSTEMS    Review of Systems   Constitutional: Positive for appetite change (not much appetite with pain and depression), fatigue (moderate) and unexpected weight change (lost 10 lbs in 3 months)  HENT: Negative  Eyes: Negative  Respiratory: Negative  Cardiovascular: Negative  Gastrointestinal: Positive for abdominal pain and diarrhea  Endocrine: Negative  Genitourinary: Negative  Musculoskeletal: Positive for arthralgias, back pain, gait problem (unstable balance, weakness in legs, using a standard cane, last fall October 2017), neck pain and neck stiffness  Skin: Positive for rash (dry itchy skin on legs)  Allergic/Immunologic: Negative  Neurological: Positive for weakness (BL upper and lower extremities) and numbness (B/L arms N/T)  Hematological: Bruises/bleeds easily  Psychiatric/Behavioral: Positive for decreased concentration (slight) and sleep disturbance (some)  The patient is nervous/anxious (nervous / depressed about how he feels and being alone)  Meds/Allergies     Current Outpatient Prescriptions   Medication Sig Dispense Refill    allopurinol (ZYLOPRIM) 100 mg tablet Take 100 mg by mouth daily      aspirin 325 mg tablet Take 325 mg by mouth daily      losartan (COZAAR) 100 MG tablet Take 100 mg by mouth daily      omeprazole (PriLOSEC) 20 mg delayed release capsule Take 20 mg by mouth as needed      sotalol (BETAPACE) 120 mg tablet Take 120 mg by mouth 2 (two) times a day       No current facility-administered medications for this visit          No Known Allergies    PAST HISTORY    Past Medical History:   Diagnosis Date    A-fib [facility]) 01/01/2005    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 03/10/2017    Anemia 12/31/2015    Back ache 08/31/2017    Diarrhea 08/31/2017    Diverticula of colon 01/01/2012    Family history of prostate cancer     Father    GERD with esophagitis 08/31/2017    Gout 08/31/2017    Hordeolum externum of lower eyelid 08/31/2017    Hyperlipidemia 05/01/2017    Hypertension, essential 05/01/2017    Hyponatremia 07/10/2014    Loss of taste 05/01/2017    Muscle strain 08/31/2017    Osteoarthritis     Paresthesia 06/08/2018    Raised prostate specific antigen 05/01/2017    Skin lesion 03/08/2018    Spinal stenosis of lumbar region 02/06/2017    Steatosis of liver 08/31/2017    Weight loss, unintentional 08/31/2017       Past Surgical History:   Procedure Laterality Date    CHEST TUBE INSERTION  1980    Insertion and removal / collapsed lung    OTHER SURGICAL HISTORY  03/10/2017    Abscess removed from L5 & S1     Chest tube for was for spontaneous pneumothorax in 1980  Social History   Substance Use Topics    Smoking status: Former Smoker     Types: Cigarettes     Quit date: 1980    Smokeless tobacco: Never Used    Alcohol use Yes      Comment: Beer       Family History   Problem Relation Age of Onset    Prostate cancer Father     Kidney disease Father     Pancreatic cancer Mother          Above history personally reviewed  EXAM    Vitals:Blood pressure 154/97, pulse 58, temperature 99 5 °F (37 5 °C), temperature source Tympanic, resp  rate 16, height 5' 9" (1 753 m), weight 57 6 kg (127 lb)  ,Body mass index is 18 75 kg/m²  Physical Exam   Constitutional: He is oriented to person, place, and time  He appears well-developed  Under weight   HENT:   Head: Normocephalic and atraumatic  Eyes: No scleral icterus  Neck: Neck supple  Excellent range of motion, extension, flexion, rotation  Cardiovascular: Normal rate  Pulmonary/Chest: Effort normal    Abdominal: Soft  Normal appearance  Musculoskeletal: Normal range of motion  Neurological: He is alert and oriented to person, place, and time  No sensory deficit  Reflex Scores:       Bicep reflexes are 1+ on the right side and 1+ on the left side  Patellar reflexes are 3+ on the right side and 2+ on the left side  Skin: Skin is warm and dry  Psychiatric: He has a normal mood and affect   His speech is normal and behavior is normal  Neurologic Exam     Mental Status   Oriented to person, place, and time  Attention: normal    Speech: speech is normal   Level of consciousness: alert    Cranial Nerves     CN VII   Facial expression full, symmetric  Motor Exam   Muscle bulk: normal  Overall muscle tone: normal  See discussion     Gait, Coordination, and Reflexes     Tremor   Resting tremor: absent  Intention tremor: absent  Action tremor: absent    Reflexes   Right biceps: 1+  Left biceps: 1+  Right patellar: 3+  Left patellar: 2+  Right Salazar: present  Left Salazar: present  Right ankle clonus: present  Left ankle clonus: absent  Standard cane         MEDICAL DECISION MAKING    Imaging Studies:     Uploaded MRI scan cervical spine from [facility], done 7/11/18: I have personally reviewed pertinent reports     and I have personally reviewed pertinent films in PACS

## 2018-08-16 NOTE — OP NOTE
Neurosurgery Operative Room Note    Juan Pablo Wagner  8/16/2018    Pre-op Diagnosis:   Myelomalacia ([address] ) [G95 89]  Spinal cord compression ([address] ) [G95 20]  Other spondylosis with myelopathy, cervical region [M47 12]    Post-op Dignosis:     Post-Op Diagnosis Codes: * Myelomalacia ([address] ) [G95 89]     * Spinal cord compression ([address] ) [G95 20]     * Other spondylosis with myelopathy, cervical region [M47 12]    Procedure:  Procedure(s):  Posterior cervical decompression/laminectomy C3-5, superior 6, fixation fusion C2- T1    Surgeon: Surgeon(s) and Role:     * Kina Ortiz MD - Primary     Anesthesia: General    Staff:   Circulator: Aurelio Anderson RN; Bassam Cantu RN  Radiology Technologist: Jes Santana  Relief Circulator: Judge Sam RN; Jael Cantu RN  Scrub Person: Verónica Hyatt  No qualified Resident was available  Estimated Blood Loss: Minimal    Specimens:                  Order Name Source Comment Collection Info Order Time   MRSA CULTURE Nose  Collected By: Soheila Gutierrez RN 8/16/2018  5:40 AM   BASIC METABOLIC PANEL Arm, Right  Collected By: Maury Dominguez 8/16/2018  5:40 AM       Drains:   Closed/Suction Drain Right;Posterior Neck Bulb 7 Fr  (Active)       Urethral Catheter Latex 16 Fr  (Active)   Site Assessment Clean;Skin intact 8/16/2018  8:58 AM       Findings:  High grade compression particularly at X9-4    Complications:  None    OR note:    Indications    82-year-old male who since April feels he has been having worsening difficulty with his hands  Began with right hand tingling which has progressed, Now involves his left hand  He also has weakness in his arms, and cannot lift his arms/abduct his arms above 90 degrees  He has considerable difficulty with self care etc   His hands are the worst in terms of sensation, this is numbness and tingling, but involves all fingers and the distal arms    He also has great difficulty with his walking, has now progressed using a standard cane  He also mentions bladder difficulty i e  some mild hesitancy and decreased stream strength, nocturia 2 times nightly  Taken together, his mJOA score is 261773 or 9/17  He does not have profound neck pain, rating it 3-4/10  He feels these hand symptoms in particular are worsening quite acutely now  Other metrics, works full-time but stopped this week, arm pain 9-10/10, NDI 22 percent, EQ 5 D3 L to 2232      On exam, the patient has bilateral Salazar's, biceps reflexes are essentially normal, right patellar reflex is 3+, left 2+, plus-minus clonus on the right  Hand strength, interosseous 3/5, deltoids 4 to 4+/5 to 90 degree point, otherwise biceps triceps wrist extensors  all 4+ at least/5  Very unsteady on his feet, relies heavily on the cane, considerable falls risk      MRI scan of the cervical spine personally reviewed as was report  Shows myelomalacia at C3-4, with profound stenosis primarily from a posterior vector  He also is a herniated disc left side C5-6, but he has no profound radicular complaint left side as opposed to myelopathy  There is circumferential compression at the C5-6 disc space      I reviewed with the patient the natural history of cervical myelopathy  I reviewed him options for management  We spent considerable time discussing surgical options  I recommended offered and obtained consent for posterior cervical approach:  Posterior cervical laminectomy/decompression C3-5, superior C6, with fusion C2-C7, possible T1, additional levels as needed  I in detail reviewed with him expected hospital course, typically 3 days, PT evaluations, likely that he will need to be discharged to acute rehab, etc   Our office will connect with him and schedule him for surgery, unless he changes as mind  Details of Procedure      The patient was brought to OR and successfully induced with general endotracheal anesthesia  A radial arterial line was placed   The patient was placed in Calles pins, and then log rolled onto chest rolls on the OR table, and the Calles connected to the OR table  A/P and lateral fluoroscopy was used to confirm good alignment, lordosis, etc      A midline posterior incision was marked  Hair was minimally clipped  The posterior cervical area was prepped and draped in the standard fashion  A timeout was performed  The patient received antibiotics per protocol, 10mg of Decadron, and MAPs were kept > 85 at all times  The skin was incised with a skin scalpel and dissection carried down through the midline raphe, and a subperiosteal dissection carried out over the intended posterior cervical spine  A fluorograph was taken to confirm the levels exposed prior to any bone/ligamentous removal/disruption  Once we had good exposure, I started with pars screw placement at C2  The medial aspect of the pars was palpated bilaterally  Entry point was confirmed on lateral fluoroscopy  A ball bur was used create entry point, and then trauma drill using a 16 mm depth scarred used to cannulate the bilateral pars  This was sounded, and found to be competent  There were tapped with 3 5 mm taps, and 3 5 mm x 16 mm screws placed bilaterally in the pars at C2  There is extensive arthritic overgrowth of the facet joints from C2-T1, and manage these were burred away to expose the facet joints etc   The ball bur on the Midas Ziggy drill was to create entry points to the posterior cortex of the lateral masses of C3-C7 bilaterally  The lateral masses were then prepared by drilling in the typical up and out trajectory using a 14 mm depth guard  These were sounded and found to be competent  They were tapped as above  14 mm long by 3 5 mm diameter lateral mass screws were placed at these levels without difficulty, with the exception of on the right at C6, which was not competent and did not receive a screw       In line with the lateral mass screws, an entry point was selected at the superior portion of T1, at the C7-T1 facet  A/P fluoroscopic imaging was also used as needed  An entry point over the pedicle was started with a ball bur on the Midas Ziggy drill  The Lenke probe was used to cannulate the T1 pedicle, as confirmed on lateral fluoroscopy  This was sounded, and there were no breaches in the pedicle  3 5 tap was used to prepare the pedicle, and after sounding, a 4 5 mm diameter by 28 mm long screw was placed in the pedicle of T1  This was repeated on the opposite side without difficulty  Rods were cut at about 95 mm long, titanium, and bent to fit but not yet secured  Match stick bit on Midas-Ziggy drill was then used to create troughs bilaterally and lamina C3-C5  The interspinous ligament at C2-3, and C5-6 was divided  The laminar shelf from C3-5 was elevated, the ligamentum flavum divided with the Kerrison punch  The thecal sac expanded well to laminectomy  There is no sign of durotomy  Residual compressive ligamentum was resected  The superior aspect of C6 lamina was also drilled off, leaving the C6 spinous process intact and its connection to C7  Epidural hemostasis was achieved  Copious irrigation with antibiotic containing saline was used to remove debris and irrigated out the wound  The rods were secured bilaterally with set screws then were final tightened  Again the area was copiously irrigated with antibiotic containing saline  The exposed lateral masses, facets etc were decorticated from C2-T1 creating arthrodesis  Bone from the laminectomy was morselized the bone mill, mixed with Progenix putty, and applied bilaterally with the decorticated bone to create arthrodesis  1 g of vancomycin powder was applied over the hardware to minimize infection risk  A an epidural 7 French flat Trev-Miller drain was placed epidural, tunneled out inferiorly through a separate stab incision      The cervical musculature was reapproximated with 0 Vicryl Plus suture, and then the fascia closed with interrupted 0 Vicryl Plus suture  The deep dermis was closed with inverted interrupted 2 0 Vicryl Plus suture  The skin was closed staples  The drain was secured with drain stitch  The incision was dressed with Acticoat, 4x4s, Tegaderm  All instrument counts, sponge counts, and needle counts were correct prior to closure the skin  The Calles was detached from the OR table, the patient rotated supine onto his hospital bed, and the Calles pins removed without bleeding from the pin sites  The patient was awoken from general endotracheal anesthesia, extubated, and taken to the PACU in cardiovascularly stable condition         Arleen Pitt MD     Date: 8/16/2018  Time: 12:29 PM

## 2018-08-17 ENCOUNTER — APPOINTMENT (INPATIENT)
Dept: RADIOLOGY | Facility: HOSPITAL | Age: 63
DRG: 321 | End: 2018-08-17
Attending: NEUROLOGICAL SURGERY
Payer: COMMERCIAL

## 2018-08-17 LAB
ANION GAP SERPL CALCULATED.3IONS-SCNC: 6 MMOL/L (ref 4–13)
BUN SERPL-MCNC: 10 MG/DL (ref 5–25)
CALCIUM SERPL-MCNC: 8.3 MG/DL (ref 8.3–10.1)
CHLORIDE SERPL-SCNC: 101 MMOL/L (ref 100–108)
CO2 SERPL-SCNC: 25 MMOL/L (ref 21–32)
CREAT SERPL-MCNC: 0.72 MG/DL (ref 0.6–1.3)
ERYTHROCYTE [DISTWIDTH] IN BLOOD BY AUTOMATED COUNT: 12 % (ref 11.6–15.1)
GFR SERPL CREATININE-BSD FRML MDRD: 101 ML/MIN/1.73SQ M
GLUCOSE SERPL-MCNC: 98 MG/DL (ref 65–140)
HCT VFR BLD AUTO: 32.5 % (ref 36.5–49.3)
HGB BLD-MCNC: 11.1 G/DL (ref 12–17)
MCH RBC QN AUTO: 32.6 PG (ref 26.8–34.3)
MCHC RBC AUTO-ENTMCNC: 34.2 G/DL (ref 31.4–37.4)
MCV RBC AUTO: 96 FL (ref 82–98)
MRSA NOSE QL CULT: NORMAL
PLATELET # BLD AUTO: 160 THOUSANDS/UL (ref 149–390)
PMV BLD AUTO: 10.5 FL (ref 8.9–12.7)
POTASSIUM SERPL-SCNC: 3.8 MMOL/L (ref 3.5–5.3)
RBC # BLD AUTO: 3.4 MILLION/UL (ref 3.88–5.62)
SODIUM SERPL-SCNC: 132 MMOL/L (ref 136–145)
WBC # BLD AUTO: 10.5 THOUSAND/UL (ref 4.31–10.16)

## 2018-08-17 PROCEDURE — G8978 MOBILITY CURRENT STATUS: HCPCS

## 2018-08-17 PROCEDURE — 97167 OT EVAL HIGH COMPLEX 60 MIN: CPT

## 2018-08-17 PROCEDURE — G8987 SELF CARE CURRENT STATUS: HCPCS

## 2018-08-17 PROCEDURE — 97163 PT EVAL HIGH COMPLEX 45 MIN: CPT

## 2018-08-17 PROCEDURE — 72040 X-RAY EXAM NECK SPINE 2-3 VW: CPT

## 2018-08-17 PROCEDURE — 85027 COMPLETE CBC AUTOMATED: CPT | Performed by: NEUROLOGICAL SURGERY

## 2018-08-17 PROCEDURE — G8988 SELF CARE GOAL STATUS: HCPCS

## 2018-08-17 PROCEDURE — 80048 BASIC METABOLIC PNL TOTAL CA: CPT | Performed by: NEUROLOGICAL SURGERY

## 2018-08-17 PROCEDURE — G8979 MOBILITY GOAL STATUS: HCPCS

## 2018-08-17 RX ADMIN — METHOCARBAMOL TABLETS 750 MG: 750 TABLET, COATED ORAL at 08:21

## 2018-08-17 RX ADMIN — SODIUM CHLORIDE 100 ML/HR: 0.9 INJECTION, SOLUTION INTRAVENOUS at 13:31

## 2018-08-17 RX ADMIN — OXYCODONE HYDROCHLORIDE 5 MG: 5 TABLET ORAL at 08:21

## 2018-08-17 RX ADMIN — METHOCARBAMOL TABLETS 750 MG: 750 TABLET, COATED ORAL at 17:49

## 2018-08-17 RX ADMIN — LOSARTAN POTASSIUM 100 MG: 50 TABLET ORAL at 08:21

## 2018-08-17 RX ADMIN — SODIUM CHLORIDE 100 ML/HR: 0.9 INJECTION, SOLUTION INTRAVENOUS at 02:59

## 2018-08-17 RX ADMIN — SOTALOL HYDROCHLORIDE 120 MG: 120 TABLET ORAL at 08:21

## 2018-08-17 RX ADMIN — CEFAZOLIN SODIUM 1000 MG: 1 SOLUTION INTRAVENOUS at 02:56

## 2018-08-17 RX ADMIN — ACETAMINOPHEN 975 MG: 325 TABLET ORAL at 21:56

## 2018-08-17 RX ADMIN — OXYCODONE HYDROCHLORIDE 5 MG: 5 TABLET ORAL at 13:24

## 2018-08-17 RX ADMIN — PANTOPRAZOLE SODIUM 40 MG: 40 TABLET, DELAYED RELEASE ORAL at 05:34

## 2018-08-17 RX ADMIN — ENOXAPARIN SODIUM 40 MG: 40 INJECTION SUBCUTANEOUS at 13:24

## 2018-08-17 RX ADMIN — ACETAMINOPHEN 975 MG: 325 TABLET ORAL at 13:24

## 2018-08-17 RX ADMIN — SENNOSIDES AND DOCUSATE SODIUM 2 TABLET: 8.6; 5 TABLET ORAL at 08:21

## 2018-08-17 RX ADMIN — ACETAMINOPHEN 975 MG: 325 TABLET ORAL at 05:33

## 2018-08-17 RX ADMIN — SOTALOL HYDROCHLORIDE 120 MG: 120 TABLET ORAL at 17:49

## 2018-08-17 NOTE — PLAN OF CARE
DISCHARGE PLANNING     Discharge to home or other facility with appropriate resources Progressing        INFECTION - ADULT     Absence or prevention of progression during hospitalization Progressing        Knowledge Deficit     Patient/family/caregiver demonstrates understanding of disease process, treatment plan, medications, and discharge instructions Progressing        Nutrition/Hydration-ADULT     Nutrient/Hydration intake appropriate for improving, restoring or maintaining nutritional needs Progressing        PAIN - ADULT     Verbalizes/displays adequate comfort level or baseline comfort level Progressing        Potential for Falls     Patient will remain free of falls Progressing        SAFETY ADULT     Maintain or return to baseline ADL function Progressing     Maintain or return mobility status to optimal level Progressing     Patient will remain free of falls Progressing

## 2018-08-17 NOTE — PLAN OF CARE
Problem: PHYSICAL THERAPY ADULT  Goal: Performs mobility at highest level of function for planned discharge setting  See evaluation for individualized goals  Treatment/Interventions: Functional transfer training, LE strengthening/ROM, Elevations, Therapeutic exercise, Endurance training, Patient/family training, Cognitive reorientation, Equipment eval/education, Bed mobility, Gait training, Compensatory technique education, Continued evaluation, Spoke to nursing, Spoke to case management, Family  Equipment Recommended: Amalia Rivero       See flowsheet documentation for full assessment, interventions and recommendations    Prognosis: Good  Problem List: Decreased strength, Decreased endurance, Impaired balance, Decreased range of motion, Decreased mobility, Decreased coordination, Impaired judgement, Decreased safety awareness, Pain, Orthopedic restrictions  Assessment: Pt is a 66 y o  male admitted to [facility] on 8/16/2018 w/ BUE progressive neuropathy, tingling, weakness impacting self care Pt exhibits significant impairments with weakness, decreased ROM, impaired balance, decreased endurance, impaired coordination, gait deviations, pain, decreased activity tolerance, decreased functional mobility tolerance, altered sensation, decreased safety awareness, impaired judgement, fall risk, orthopedic restrictions, impaired tone, decreased skin integrity and decreased cognition; these impact independence with mobility, ADLs, and IADLs; requires min assist w transf and amb 70 ft x2 using RW Ccollar donned, gait pattern w inconsistent catherine, decreased foot clearance, walker reliant + falls risk; objective measures on the Barthel Index also reveal limitations;  therapy prognosis is impacted by relevant co morbidities as noted in evaluation; clinical presentation is currently unstable/unpredictable - pt is on cont pulse oximetry, neurovasc checks has drain in place, presents w phys impairments as noted- a regression from baseline; PTA, pt was Independent with mobility lives alone in multilevel setup w limited support; pt reports his doctor anticipated his needing 2-3 wks of rehab at which point pt reports his sister will be available to assist; PT communicated w Nsx and CM for f/u skilled PT is indicated to to optimize functional independence and discharge planning; pending functional progress, PT recommendation at discharge is IP rehab vs home with assist for mob depending on funct progress and assist available at time of discharge will need RW        Recommendation:  (IP rehab v HHPT/home w assist dep on progress and suuport)          See flowsheet documentation for full assessment

## 2018-08-17 NOTE — OCCUPATIONAL THERAPY NOTE
[address] Evaluation     Patient Name: Dom Gold  Today's Date: 8/17/2018  Problem List  Patient Active Problem List   Diagnosis    Myelomalacia [facility])    Sp[facility] compression [facility] )    Other spondylosis with myelopathy, cervical region     Past Medical History  Past Medical History:   Diagnosis Date    A-fib ([facility] ) 01/01/2005    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 03/10/2017    Anemia 12/31/2015    Anxiety     Back ache 08/31/2017    Diarrhea 08/31/2017    Diverticula of colon 01/01/2012    Family history of prostate cancer     Father    GERD (gastroesophageal reflux disease)     GERD with esophagitis 08/31/2017    Gout 08/31/2017    Hordeolum externum of lower eyelid 08/31/2017    Hyperlipidemia 05/01/2017    Hypertension, essential 05/01/2017    Hyponatremia 07/10/2014    Loss of taste 05/01/2017    Muscle strain 08/31/2017    Osteoarthritis     Paresthesia 06/08/2018    Raised prostate specific antigen 05/01/2017    Skin lesion 03/08/2018    Spinal stenosis of lumbar region 02/06/2017    Steatosis of liver 08/31/2017    Weight loss, unintentional 08/31/2017     Past Surgical History  Past Surgical History:   Procedure Laterality Date    BACK SURGERY      CHEST TUBE INSERTION  1980    Insertion and removal / collapsed lung    COLONOSCOPY      OTHER SURGICAL HISTORY  03/10/2017    Abscess removed from L5 & S1    HI ARTHRODESIS POSTERIOR/POSTERIORLATERAL CERVICAL BELOW C2 N/A 8/16/2018    Procedure: Posterior cervical decompression/laminectomy C3-5, superior 6, fixation fusion C2- T1;  Surgeon: Radames Cushing, MD;  Location: [facility];  Service: Neurosurgery         08/17/18 1129   Note Type   Note type Eval/Treat   Restrictions/Precautions   Weight Bearing Precautions Per Order No   Braces or Orthoses C/S Collar   Other Precautions Pain; Fall Risk;Multiple lines;Spinal precautions  (catheter; drains; IV )   Pain Assessment   Pain Assessment No/denies pain Pain Score No Pain   Hospital Pain Intervention(s) Ambulation/increased activity;Repositioned   Response to Interventions tolerated    Home Living   Type of [address] Two level;Bed/bath upstairs;Stairs to enter with rails  ([address] )   Bathroom Shower/Tub Tub/shower unit   Bathroom Toilet Standard   Bathroom Equipment Shower chair;Commode  (use of SC at baseline )   [address] Walker;Cane  (denies use at baseline )   Prior Function   Level of [place] Independent with ADLs and functional mobility   Lives With Alone   Receives Help From Family  (limited support  reports sister may be able to help )   ADL Assistance Independent   IADLs Independent   Falls in the last 6 months 0   Vocational Full time employment   Lifestyle   Autonomy Pt reports he was independent with ADLs/IADLs/driving PTA  Reciprocal Relationships Pt lives alone  Pt with limited support  Pt reports sister may be able to help upon d/c     Service to Others Pt works full time    Intrinsic Gratification Pt enjoys working and watching TV    Subjective   Subjective Pt reports, "my  on my hands are bad"    ADL   Eating Assistance 7  [place] 4  Minimal Assistance    [address] 4  Minimal Assistance   [place] 4  Minimal Assistance   Bed Mobility   Additional Comments Unable to assess 2' pt sitting in chair at beginning of session    Transfers   Sit to Stand 4  Minimal assistance   Additional items Assist x 1; Increased time required;Verbal cues   Stand to Sit 4  Minimal assistance   Additional items Assist x 1; Increased time required;Verbal cues   Functional Mobility   Functional Mobility 4  Minimal assistance   Additional items Rolling walker   Balance   Static Sitting Good   Dynamic Sitting Fair +   Static Standing Fair   Dynamic Standing Fair -   Ambulatory Fair -   Activity Tolerance   Activity Tolerance Patient limited by fatigue   Medical Staff Made Aware PTChristiana   Nurse Made Aware Appropriate to see per RN    RUE Assessment   RUE Assessment WFL   LUE Assessment   LUE Assessment WFL   Hand Function   Gross Motor Coordination Impaired   Fine Motor Coordination Impaired   Sensation   Light Touch Partial deficits in the RUE;Partial deficits in the LUE   Vision-Basic Assessment   Current Vision Wears glasses for distance only   Cognition   Overall Cognitive Status [facility]   Arousal/Participation Alert; Responsive; Cooperative   Attention Within functional limits   Orientation Level Oriented X4   Memory Within functional limits   Following Commands Follows all commands and directions without difficulty   Comments Pt is pleasant and cooperative to work with  Pt educated on spinal precautions at beginning of session  Pt able to recall 3/3 spinal precautions at end of session  Pt required verbal cues for carryover of energy conservation techniques and correct hand placement during functional transfers  Assessment   Limitation Decreased ADL status; Decreased UE ROM; Decreased UE strength;Decreased Safe judgement during ADL;Decreased endurance;Decreased self-care trans;Decreased high-level ADLs   Prognosis Good   Assessment Pt is a 66 y o  male seen for inital OT evaluation  Pt admitted to hospital with myelomalacia, neck pain, spinal cord compression, and spondylosis with myelopathy of cervical region  Pt is s/p posterior cervical decompression and laminectomy C3-5, superior 6, fixation fushion C2-T1  Pt with c-collar and spinal precautions  Pt reports numbness in B/L hands which is no different from baseline  Pt's past medical history includes a-fib, anemia, GERD, gout, hypertension, hyperlipidemia, hyponatremia, osteoarthritis  Pt lives in a 2 story home alone   Pt reports he was independent with ADLs/IADLs/driving PTA  Pt reports use of SC at baseline  Pt works full time and enjoys watching TV  Pt sitting in chair at beginning of session reporting no pain  Pt educated on spinal precautions and c-collar management  During education on c-collar management, pt reported inability to feel the adjustment knob on his c-collar 2' limited sensation in his hands  Pt performed sit <-> stand transfer/functional mobility/stair management at MIN A level with use of RW  Pt left in chair at end of session with all items within reach + chair alarm on  Pt able to recall 3/3 spinal precautions at end of session  Pt required verbal cues for carryover of energy conservation techniques and correct hand placement during functional transfers  At this time, pt is overall at [facility] for ADLs  Pt's limitations include spinal precautions, B/L hand numbness, impaired balance, decreased activity tolerance, limited support, and weakness  Will continue to F/U to address OT established goals  From an OT perspective, d/c recommendations include home vs inpt rehab pending pt progress and support available upon d/c  Pt reported desire to go to inpt rehab  Goals   Patient Goals to go to rehab    LTG Time Frame 7-10   Long Term Goal #1 See below    Plan   Treatment Interventions ADL retraining;Functional transfer training;UE strengthening/ROM; Endurance training;Cognitive reorientation;Patient/family training;Equipment evaluation/education; Compensatory technique education; Activityengagement; Energy conservation; Fine motor coordination activities   Goal Expiration Date 08/27/18   OT Frequency 3-5x/wk   Recommendation   OT Discharge Recommendation Home with family support  (vs  inpt rehab pending pt progress and available support )   Equipment Recommended (use of SC and RW )   OT - OK to Discharge Yes  (when medically cleared )   Barthel Index   Feeding 10   Bathing 0   Grooming Score 5   Dressing Score 5   Bladder Score 10 Bowels Score 10   Toilet Use Score 5   Transfers (Bed/Chair) Score 10   Mobility (Level Surface) Score 0   Stairs Score 0   Barthel Index Score 55   Modified Heath Scale   Modified [place] Scale 4     Pt will be 100% attentive during pt education on energy conservation techniques and compensatory strategies with G carryover t/o functional tasks to assist in safe d/c planning  Pt will perform UB and LB ADLs, including c-collar management, MOD I level with G carryover of spinal precautions  Pt will perform bed mobility/ sit <-> stand transfers/functional mobility at MOD I level with G carryover of spinal precautions  Pt will improve activity tolerance to 30 minutes of sustained functional tasks to increase participation in ADLs/IADLs  Pt will perform grooming activity standing at sink for approximately 5 minutes with G balance at MOD I level  Pt will perform light IADL activity for approximately 10 minutes at MOD I level with G carryover of spinal precautions  Pt will improve fine motor coordination to [facility] to perform c-collar management       Brady Gomez, OTS

## 2018-08-17 NOTE — SOCIAL WORK
Cm met with patient to review role of Cm  Patient presented as alert during conversation  Patient reported that he lives alone in a 2 story home with 2 steps to enter and 14 steps to 2nd floor bedroom  Patient reported that emergency contact is sister Enrique Washburn, [phone]  Patient reported that he was independent with ADLS PTA  Patient denied having any inpatient PT/OT, inpatient MH, or substance abuse treatment  Patient reported that he had Charlotte 80 RN from [facility]  Patient reported that pharmacy of choice is [facility] on [address]  Patient reported that PCP is through [place]  Patient reported that he works full time and drives independently  patient is requesting a referral to [facility] for rehab

## 2018-08-17 NOTE — PHYSICAL THERAPY NOTE
Physical Therapy Evaluation    Patient's Name:Bishnu Nguyen    Today's Date:08/17/18    Patient Active Problem List   Diagnosis    [facility] [address] [facility]    Spinal cord compression [facility] [address] [facility]    Other spondylosis with myelopathy, cervical region       Past Medical History:   Diagnosis Date    A-fib ([facility] [address] ) 01/01/2005    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 03/10/2017    Anemia 12/31/2015    Anxiety     Back ache 08/31/2017    Diarrhea 08/31/2017    Diverticula of colon 01/01/2012    Family history of prostate cancer     Father    GERD (gastroesophageal reflux disease)     GERD with esophagitis 08/31/2017    Gout 08/31/2017    Hordeolum externum of lower eyelid 08/31/2017    Hyperlipidemia 05/01/2017    Hypertension, essential 05/01/2017    Hyponatremia 07/10/2014    Loss of taste 05/01/2017    Muscle strain 08/31/2017    Osteoarthritis     Paresthesia 06/08/2018    Raised prostate specific antigen 05/01/2017    Skin lesion 03/08/2018    Spinal stenosis of lumbar region 02/06/2017    Steatosis of liver 08/31/2017    Weight loss, unintentional 08/31/2017       Past Surgical History:   Procedure Laterality Date    BACK SURGERY      CHEST TUBE INSERTION  1980    Insertion and removal / collapsed lung    COLONOSCOPY      OTHER SURGICAL HISTORY  03/10/2017    Abscess removed from L5 & S1    FL ARTHRODESIS POSTERIOR/POSTERIORLATERAL CERVICAL BELOW C2 N/A 8/16/2018    Procedure: Posterior cervical decompression/laminectomy C3-5, superior 6, fixation fusion C2- T1;  Surgeon: Jennifer Almendarez MD;  Location: [facility];  Service: Neurosurgery        08/17/18 1105   Pain Assessment   Pain Assessment FLACC   Pain Score No Pain   Hospital Pain Intervention(s) Ambulation/increased activity   Response to Interventions unchanged   Pain Rating: FLACC (Rest) - Face 0   Pain Rating: FLACC (Rest) - Legs 0   Pain Rating: FLACC (Rest) - Activity 0   Pain Rating: FLACC (Rest) - Cry 0   Pain Rating: FLACC (Rest) - Consolability 0   Score: FLACC (Rest) 0   Home Living   Type of Home House   Home Layout Multi-level   Prior Function   Level of [place] Independent with ADLs and functional mobility   Lives With Alone   Receives Help From (limited support sister retires in 2 wks)   Restrictions/Precautions   Braces or Orthoses C/S Collar   Other Precautions Fall Risk;Spinal precautions   General   Family/Caregiver Present Yes   Cognition   Arousal/Participation Alert   Orientation Level Oriented to person;Oriented to place;Oriented to situation   Following Commands Follows multistep commands with increased time or repetition   RUE Assessment   RUE Assessment (funct reach grasp and propulsion of RW)   LUE Assessment   LUE Assessment (funct reach grasp and propulsion of RW)   RLE Assessment   RLE Assessment X   Strength RLE   RLE Overall Strength 4-/5   LLE Assessment   LLE Assessment X   Strength LLE   LLE Overall Strength 4-/5   Coordination   Movements are Fluid and Coordinated 0   Coordination and Movement Description LE instability   Bed Mobility   Supine to Sit (bed mob NT- pt in chair on PT arrival)   Transfers   Sit to Stand 4  Minimal assistance   Additional items Increased time required;Verbal cues   Stand to Sit 4  Minimal assistance   Additional items Increased time required;Verbal cues   Stand pivot 4  Minimal assistance   Additional items Increased time required;Verbal cues  (RW)   Ambulation/Elevation   Gait pattern Improper Weight shift;Decreased foot clearance; Short stride; Excessively slow; Inconsistent catherine   Gait Assistance 4  Minimal assist   Additional items Verbal cues; Tactile cues   Assistive Device Rolling walker   Distance 70 ft x2   Stair Management Assistance 4  Minimal assist   Additional items Verbal cues; Tactile cues   Stair Management Technique Step to pattern; One rail R   Number of Stairs 4   Balance   Dynamic Sitting (forward reach)   Static Standing Fair  (RW)   Dynamic Standing Poor +  (RW) Endurance Deficit   Endurance Deficit Yes   Endurance Deficit Description LE isnatbility   Activity Tolerance   Activity Tolerance Patient tolerated treatment well;Patient limited by fatigue   Nurse Made Aware yes   Assessment   Prognosis Good   Problem List Decreased strength;Decreased endurance; Impaired balance;Decreased range of motion;Decreased mobility; Decreased coordination; Impaired judgement;Decreased safety awareness;Pain;Orthopedic restrictions   Assessment Pt is a 66 y o  male admitted to [facility] on 8/16/2018 w/ BUE progressive neuropathy, tingling, weakness impacting self care Pt exhibits significant impairments with weakness, decreased ROM, impaired balance, decreased endurance, impaired coordination, gait deviations, pain, decreased activity tolerance, decreased functional mobility tolerance, altered sensation, decreased safety awareness, impaired judgement, fall risk, orthopedic restrictions, impaired tone, decreased skin integrity and decreased cognition; these impact independence with mobility, ADLs, and IADLs; requires min assist w transf and amb 70 ft x2 using RW Ccollar donned, gait pattern w inconsistent catherine, decreased foot clearance, walker reliant + falls risk; objective measures on the Barthel Index also reveal limitations;  therapy prognosis is impacted by relevant co morbidities as noted in evaluation; clinical presentation is currently unstable/unpredictable - pt is on cont pulse oximetry, neurovasc checks has drain in place, presents w phys impairments as noted- a regression from baseline; PTA, pt was Independent with mobility lives alone in multilevel setup w limited support; pt reports his doctor anticipated his needing 2-3 wks of rehab at which point pt reports his sister will be available to assist; PT communicated w Nsx and CM for f/u skilled PT is indicated to to optimize functional independence and discharge planning; pending functional progress, PT recommendation at discharge is IP rehab vs home with assist for mob depending on funct progress and assist available at time of discharge will need RW   Goals   Patient Goals go to rehab   STG Expiration Date 08/27/18   Short Term Goal #1 1  Modified Independent with Bed Mobility Rolling Right and Left     2  Modified Independent with Bed Mobility Supine-Sit     3  Modified Independent with Transfer Bed-Chair     4  Increase Dynamic Sitting Balance at least 1 Grade for improved stability with functional reach activities     5  Increase Dynamic Standing Balance at least 1 Grade for improved ease with Activities of Daily Living     6  Increase Lower Extremity Strength at least 1 Grade for improved ease mobility tasks     7  Modified Independent with  Ambulation 150 feet using RW Ccollar donned to facilitate home and community mobility 8  Modified Independent with Ascending/Descending 15 steps to facilitate home and community accessibility  9  3/3x execution of Cspine precautions w funct mob tasks; 10  Independent w directing care re Ccollar alignment don/doff   Plan   Treatment/Interventions Functional transfer training;LE strengthening/ROM; Elevations; Therapeutic exercise; Endurance training;Patient/family training;Cognitive reorientation;Equipment eval/education; Bed mobility;Gait training; Compensatory technique education;Continued evaluation;Spoke to nursing;Spoke to case management; Family   PT Frequency (3-6x/wk)   Recommendation   Recommendation (IP rehab v HHPT/home w assist dep on progress and suuport)   Equipment Recommended Walker   Barthel Index   Feeding 10   Bathing 0   Grooming Score 0   Dressing Score 5   Bladder Score 10   Bowels Score 10   Toilet Use Score 5   Transfers (Bed/Chair) Score 10   Mobility (Level Surface) Score 0   Stairs Score 0   Barthel Index Score 50

## 2018-08-17 NOTE — CASE MANAGEMENT
Thank you,  [address]  Utilization Review Department  Phone: [phone]; Fax [phone]  ATTENTION: Please call with any questions or concerns to [phone]  and carefully follow the prompts so that you are directed to the right person  Send all requests for admission clinical reviews, approved or denied determinations and any other requests to fax [phone]  All voicemails are confidential    Initial Clinical Review    Age/Sex: 66 y o  male    Surgery Date: 8/16/18    Procedure: Posterior cervical decompression/laminectomy C3-5, superior 6, fixation fusion C2- T1   INPT  [place] [id number]     Anesthesia: GENERAL    Admission Orders: Date/Time/Statement: 8/16/18 @ 1     Orders Placed This Encounter   Procedures    Inpatient Admission     Standing Status:   Standing     Number of Occurrences:   1     Order Specific Question:   Admitting Physician     Answer:   Gloria Gaviria [[id number]]     Order Specific Question:   Level of Care     Answer:   Level 2 Stepdown / HOT [14]     Order Specific Question:   Estimated length of stay     Answer:   Inpatient Only Surgery       Vital Signs: /74 (BP Location: Left arm)   Pulse 60   Temp 98 1 °F (36 7 °C)   Resp 18   Ht 5' 9" (1 753 m)   Wt 61 8 kg (136 lb 3 9 oz)   SpO2 100%   BMI 20 12 kg/m²     Diet:        Diet Orders            Start     Ordered    08/16/18 1633  Diet Regular; Regular House  Diet effective now     Question Answer Comment   Diet Type Regular    Regular Regular House    Long Term Care Instructions Yes/No No    RD to adjust diet per protocol?  Yes        08/16/18 1632          Mobility: OOB    DVT Prophylaxis: SEQ COMP DEVICE    Pain Control:   Pain Medications             aspirin 325 mg tablet Take 325 mg by mouth daily    methocarbamol (ROBAXIN) 750 mg tablet Take 750 mg by mouth 3 (three) times a day as needed            History of Illness: 37-year-old male who since April feels he has been having worsening difficulty with his hands  Began with right hand tingling which has progressed, Now involves his left hand  He also has weakness in his arms, and cannot lift his arms/abduct his arms above 90 degrees  He has considerable difficulty with self care etc   His hands are the worst in terms of sensation, this is numbness and tingling, but involves all fingers and the distal arms  He also has great difficulty with his walking, has now progressed using a standard cane  He also mentions bladder difficulty i e  some mild hesitancy and decreased stream strength, nocturia 2 times nightly  Taken together, his mJOA score is [id number] or 9/17  He does not have profound neck pain, rating it 3-4/10  He feels these hand symptoms in particular are worsening quite acutely now  Other metrics, works full-time but stopped this week, arm pain 9-10/10, NDI 22 percent, EQ 5 D3 L to 2232      On exam, the patient has bilateral Salazar's, biceps reflexes are essentially normal, right patellar reflex is 3+, left 2+, plus-minus clonus on the right  Hand strength, interosseous 3/5, deltoids 4 to 4+/5 to 90 degree point, otherwise biceps triceps wrist extensors  all 4+ at least/5  Very unsteady on his feet, relies heavily on the cane, considerable falls risk      MRI scan of the cervical spine personally reviewed as was report  Shows myelomalacia at C3-4, with profound stenosis primarily from a posterior vector  He also is a herniated disc left side C5-6, but he has no profound radicular complaint left side as opposed to myelopathy  There is circumferential compression at the C5-6 disc space      I reviewed with the patient the natural history of cervical myelopathy  I reviewed him options for management  We spent considerable time discussing surgical options    I recommended offered and obtained consent for posterior cervical approach:  Posterior cervical laminectomy/decompression C3-5, superior C6, with fusion C2-C7, possible T1, additional levels as needed  I in detail reviewed with him expected hospital course, typically 3 days, PT evaluations, likely that he will need to be discharged to acute rehab, etc   Our office will connect with him and schedule him for surgery, unless he changes as mind      He takes 325 milligram aspirin for atrial fibrillation (did not want to take anti-coagulation), and this will need to be held for 1 week prior to surgery  He previously followed with Dr Bladimir Newberry of Cardiology, who retired  He will either need clearance from Dr Hannah Sahni, or to be cleared by Cardiology if anesthesia/Dr Hannah Sahni recommend this  He has a history of gout, has had no episodes for years  He does take allopurinol, which would also need to be held for surgery              Abnormal Labs/Diagnostic Test Results:     WBC 10 50  HGB 11 1, HCT 32 5        Past Medical/Surgical History:    Active Ambulatory Problems     Diagnosis Date Noted    No Active Ambulatory Problems     Resolved Ambulatory Problems     Diagnosis Date Noted    No Resolved Ambulatory Problems     Past Medical History:   Diagnosis Date    A-fib ([facility] [address] ) 01/01/2005    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 03/10/2017    Anemia 12/31/2015    Anxiety     Back ache 08/31/2017    Diarrhea 08/31/2017    Diverticula of colon 01/01/2012    Family history of prostate cancer     GERD (gastroesophageal reflux disease)     GERD with esophagitis 08/31/2017    Gout 08/31/2017    Hordeolum externum of lower eyelid 08/31/2017    Hyperlipidemia 05/01/2017    Hypertension, essential 05/01/2017    Hyponatremia 07/10/2014    Loss of taste 05/01/2017    Muscle strain 08/31/2017    Osteoarthritis     Paresthesia 06/08/2018    Raised prostate specific antigen 05/01/2017    Skin lesion 03/08/2018    Spinal stenosis of lumbar region 02/06/2017    Steatosis of liver 08/31/2017    Weight loss, unintentional 08/31/2017       Admitting Diagnosis: Myelomalacia ([facility] [address] ) [G95 89]  Spinal cord compression ([facility] [address] ) [G95 20]  Other spondylosis with myelopathy, cervical region [M47 12]    Admission Orders:  CERVICAL COLLAR  DRAIN TO FULL SUCTION  WOUND DRAIN  WOUND CARE  IS  PT/OT  C&DB  NEURO CHECKS Q4H  DAILY WEIGHTS  Scheduled Meds:   Current Facility-Administered Medications:  acetaminophen 650 mg Oral Q4H PRN Saida Green MD    acetaminophen 975 mg Oral [facility] Saida Green MD    enoxaparin 40 mg Subcutaneous Daily Saida Green MD    HYDROmorphone 0 5 mg Intravenous Q1H PRN Saida Green MD    losartan 100 mg Oral Daily Saida Green MD    methocarbamol 750 mg Oral 4x Daily PRN aSida Green MD    naloxone 0 04 mg Intravenous Q1MIN PRN Saida Green MD    ondansetron 4 mg Intravenous Q6H PRN Saida Green MD    oxyCODONE 10 mg Oral Q4H PRN Saida Green MD    oxyCODONE 5 mg Oral Q4H PRN Saida Green MD    pantoprazole 40 mg Oral Early Morning Saida Green MD    senna-docusate sodium 2 tablet Oral Daily Saida Green MD    sodium chloride 100 mL/hr Intravenous Continuous Saida Green MD Last Rate: 100 mL/hr (08/17/18 0259)   sotalol 120 mg Oral BID Saida Green MD      Continuous Infusions:   sodium chloride 100 mL/hr Last Rate: 100 mL/hr (08/17/18 0259)     PRN Meds:   acetaminophen    HYDROmorphone    methocarbamol    naloxone    ondansetron    oxyCODONE X1    oxyCODONE

## 2018-08-17 NOTE — PLAN OF CARE
Problem: OCCUPATIONAL THERAPY ADULT  Goal: Performs self-care activities at highest level of function for planned discharge setting  See evaluation for individualized goals  Treatment Interventions: ADL retraining, Functional transfer training, UE strengthening/ROM, Endurance training, Cognitive reorientation, Patient/family training, Equipment evaluation/education, Compensatory technique education, Activityengagement, Energy conservation, Fine motor coordination activities  Equipment Recommended:  (use of SC and RW )       See flowsheet documentation for full assessment, interventions and recommendations  Limitation: Decreased ADL status, Decreased UE ROM, Decreased UE strength, Decreased Safe judgement during ADL, Decreased endurance, Decreased self-care trans, Decreased high-level ADLs  Prognosis: Good  Assessment: Pt is a 66 y o  male seen for inital OT evaluation  Pt admitted to hospital with myelomalacia, neck pain, spinal cord compression, and spondylosis with myelopathy of cervical region  Pt is s/p posterior cervical decompression and laminectomy C3-5, superior 6, fixation fushion C2-T1  Pt with c-collar and spinal precautions  Pt reports numbness in B/L hands which is no different from baseline  Pt's past medical history includes a-fib, anemia, GERD, gout, hypertension, hyperlipidemia, hyponatremia, osteoarthritis  Pt lives in a 2 story home alone  Pt reports he was independent with ADLs/IADLs/driving PTA  Pt reports use of SC at baseline  Pt works full time and enjoys watching TV  Pt sitting in chair at beginning of session reporting no pain  Pt educated on spinal precautions and c-collar management  During education on c-collar management, pt reported inability to feel the adjustment knob on his c-collar 2' limited sensation in his hands  Pt performed sit <-> stand transfer/functional mobility/stair management at MIN A level with use of RW   Pt left in chair at end of session with all items within reach + chair alarm on  Pt able to recall 3/3 spinal precautions at end of session  Pt required verbal cues for carryover of energy conservation techniques and correct hand placement during functional transfers  At this time, pt is overall at [facility] for ADLs  Pt's limitations include spinal precautions, B/L hand numbness, impaired balance, decreased activity tolerance, limited support, and weakness  Will continue to F/U to address OT established goals  From an OT perspective, d/c recommendations include home vs inpt rehab pending pt progress and support available upon d/c  Pt reported desire to go to inpt rehab         OT Discharge Recommendation: Home with family support (vs  inpt rehab pending pt progress and available support )  OT - OK to Discharge: Yes (when medically cleared )

## 2018-08-18 LAB
ANION GAP SERPL CALCULATED.3IONS-SCNC: 5 MMOL/L (ref 4–13)
BUN SERPL-MCNC: 8 MG/DL (ref 5–25)
CALCIUM SERPL-MCNC: 8.4 MG/DL (ref 8.3–10.1)
CHLORIDE SERPL-SCNC: 99 MMOL/L (ref 100–108)
CO2 SERPL-SCNC: 28 MMOL/L (ref 21–32)
CREAT SERPL-MCNC: 0.78 MG/DL (ref 0.6–1.3)
ERYTHROCYTE [DISTWIDTH] IN BLOOD BY AUTOMATED COUNT: 12.1 % (ref 11.6–15.1)
GFR SERPL CREATININE-BSD FRML MDRD: 97 ML/MIN/1.73SQ M
GLUCOSE SERPL-MCNC: 94 MG/DL (ref 65–140)
HCT VFR BLD AUTO: 32.1 % (ref 36.5–49.3)
HGB BLD-MCNC: 10.8 G/DL (ref 12–17)
MCH RBC QN AUTO: 32.3 PG (ref 26.8–34.3)
MCHC RBC AUTO-ENTMCNC: 33.6 G/DL (ref 31.4–37.4)
MCV RBC AUTO: 96 FL (ref 82–98)
PLATELET # BLD AUTO: 167 THOUSANDS/UL (ref 149–390)
PMV BLD AUTO: 10.8 FL (ref 8.9–12.7)
POTASSIUM SERPL-SCNC: 3.9 MMOL/L (ref 3.5–5.3)
RBC # BLD AUTO: 3.34 MILLION/UL (ref 3.88–5.62)
SODIUM SERPL-SCNC: 132 MMOL/L (ref 136–145)
WBC # BLD AUTO: 7.43 THOUSAND/UL (ref 4.31–10.16)

## 2018-08-18 PROCEDURE — 97116 GAIT TRAINING THERAPY: CPT

## 2018-08-18 PROCEDURE — 97535 SELF CARE MNGMENT TRAINING: CPT

## 2018-08-18 PROCEDURE — 85027 COMPLETE CBC AUTOMATED: CPT | Performed by: PHYSICIAN ASSISTANT

## 2018-08-18 PROCEDURE — 99024 POSTOP FOLLOW-UP VISIT: CPT | Performed by: PHYSICIAN ASSISTANT

## 2018-08-18 PROCEDURE — 80048 BASIC METABOLIC PNL TOTAL CA: CPT | Performed by: PHYSICIAN ASSISTANT

## 2018-08-18 RX ADMIN — SOTALOL HYDROCHLORIDE 120 MG: 120 TABLET ORAL at 18:10

## 2018-08-18 RX ADMIN — ACETAMINOPHEN 975 MG: 325 TABLET ORAL at 14:08

## 2018-08-18 RX ADMIN — PANTOPRAZOLE SODIUM 40 MG: 40 TABLET, DELAYED RELEASE ORAL at 06:45

## 2018-08-18 RX ADMIN — ACETAMINOPHEN 975 MG: 325 TABLET ORAL at 06:45

## 2018-08-18 RX ADMIN — METHOCARBAMOL TABLETS 750 MG: 750 TABLET, COATED ORAL at 14:08

## 2018-08-18 RX ADMIN — ACETAMINOPHEN 975 MG: 325 TABLET ORAL at 22:57

## 2018-08-18 RX ADMIN — OXYCODONE HYDROCHLORIDE 10 MG: 10 TABLET ORAL at 06:45

## 2018-08-18 RX ADMIN — SOTALOL HYDROCHLORIDE 120 MG: 120 TABLET ORAL at 09:49

## 2018-08-18 RX ADMIN — LOSARTAN POTASSIUM 100 MG: 50 TABLET ORAL at 09:51

## 2018-08-18 RX ADMIN — ENOXAPARIN SODIUM 40 MG: 40 INJECTION SUBCUTANEOUS at 09:49

## 2018-08-18 NOTE — PLAN OF CARE
Problem: PHYSICAL THERAPY ADULT  Goal: Performs mobility at highest level of function for planned discharge setting  See evaluation for individualized goals  Treatment/Interventions: Functional transfer training, LE strengthening/ROM, Elevations, Therapeutic exercise, Endurance training, Patient/family training, Cognitive reorientation, Equipment eval/education, Bed mobility, Gait training, Compensatory technique education, Continued evaluation, Spoke to nursing, Spoke to case management, Family  Equipment Recommended: Asya Christopher       See flowsheet documentation for full assessment, interventions and recommendations  Outcome: Progressing  Prognosis: Good  Problem List: Decreased strength, Decreased range of motion, Decreased endurance, Impaired balance, Decreased mobility, Decreased coordination, Pain, Orthopedic restrictions  Assessment: Pt supine in bed upon arrival, reports increased neck pain on his right side  Pt requires supervision only for bed mobility, Min assist for sit to stand transfers due to LOB  Pt ambulates with RW and Min assist 360' reports having difficulty steering RW due to loose  on RW states is he  too hard the looses feeling in his hands, relayed this to neurosurgery PA present  Pt was able to negotiate 7 steps with use on 1 rail and a step to pattern, LOB x1 w/ stairs  Pt was returned to room and was left with WATTS to wash in bathroom  Pt will continue to benefit from skilled PT to furhter increase his strength and enduracne and improve balance in order to maximize safe fucntional mobility  Barriers to Discharge: Decreased caregiver support     Recommendation:  (IP rehab)     PT - OK to Discharge: Yes (to rehab when medically ready)    See flowsheet documentation for full assessment

## 2018-08-18 NOTE — PLAN OF CARE
DISCHARGE PLANNING     Discharge to home or other facility with appropriate resources Progressing        DISCHARGE PLANNING - CARE MANAGEMENT     Discharge to post-acute care or home with appropriate resources Progressing        INFECTION - ADULT     Absence or prevention of progression during hospitalization Progressing        Knowledge Deficit     Patient/family/caregiver demonstrates understanding of disease process, treatment plan, medications, and discharge instructions Progressing        Nutrition/Hydration-ADULT     Nutrient/Hydration intake appropriate for improving, restoring or maintaining nutritional needs Progressing        PAIN - ADULT     Verbalizes/displays adequate comfort level or baseline comfort level Progressing        Potential for Falls     Patient will remain free of falls Progressing        SAFETY ADULT     Maintain or return to baseline ADL function Progressing     Maintain or return mobility status to optimal level Progressing     Patient will remain free of falls Progressing

## 2018-08-18 NOTE — OCCUPATIONAL THERAPY NOTE
Occupational Therapy Treatment Note:       08/18/18 1043   Restrictions/Precautions   Braces or Orthoses C/S Collar   Other Precautions Spinal precautions   Pain Assessment   Pain Assessment 0-10   Pain Score 6   Pain Type Surgical pain   Pain Location Neck   ADL   Grooming Assistance 3  Moderate Assistance   Grooming Deficit Wash/dry face; Increased time to complete;Brushing hair; Other (Comment)  (assist for thoroughness ie ears neck etc)   Grooming Comments asst for hair management, washing, drying and combing and placing into pony tail   UB Bathing Assistance 5  Supervision/Setup   UB Bathing Comments in stance   LB Bathing Assistance 5  Supervision/Setup   LB Bathing Comments in stance except for feet   UB Dressing Assistance 4  Minimal Assistance   UB Dressing Comments total asst for c ollar management  pt requires asst for fasteners at this time    Afroditis Street  (pt reports asst needed for anal care, )   Toileting Comments sba clothing management of elastic  waist pants   Functional Standing Tolerance   Time good balance wile standing sinkside for 30 min during am care / in room mobility with rw   Bed Mobility   Supine to Sit 5  Supervision   Additional items HOB elevated   Sit to Supine 5  Supervision   Additional items HOB elevated   Additional Comments reports will sleep on sofa or recliner initially      Transfers   Sit to Stand 5  Supervision   Stand to Sit 5  Supervision   Stand pivot 5  Supervision   Additional items (mod vc's for hand placement)   Functional Mobility   Functional Mobility 5  Supervision   Additional Comments vc's for newly instructed techniques with rw   Additional items Rolling walker   Cognition   Overall Cognitive Status WFL   Comments min cues throughout sessoin for hand placement with transfers   Activity Tolerance   Activity Tolerance Patient tolerated treatment well   Assessment   Assessment pt participated in am ot session and was seen focusing on full am care, bed mobility, education on rw safety with various trnasfers and standing tolerence / balance  pt was educated on c collar  management and padding was changed  pt requires total asst for this management at this time secondary to numbness in b hands  pt reports limited asst from sister upon eventual d/c  pt resides alone and is limited by numbness in b hands  prior pt used visual cues for  function with b hands which is now limited secondaryto c collar / cervical precautions pt requires physical asstfor grooming and c collar management as well as heavy iadls / homemaking question if pt has available asst from family / friends   pt may require in pt rehab to [address] [place] [address]   Treatment Interventions ADL retraining;Functional transfer training;Patient/family training   Goal Expiration Date 08/27/18   Treatment Day 1   OT Frequency 3-5x/wk   Recommendation   OT Discharge Recommendation Other (Comment)  (home first floor set up and adl assist vs in pt rehab  )   Barthel Index   Feeding 10   Bathing 0   Grooming Score 0   Dressing Score 10   Bladder Score 10   Bowels Score 10   Toilet Use Score 5   Transfers (Bed/Chair) Score 10   Mobility (Level Surface) Score 10   Stairs Score 5   Barthel Index Score 70   Modified Marlow Scale   Modified Heath Scale 4   April A MAC Steel

## 2018-08-18 NOTE — PLAN OF CARE
Problem: OCCUPATIONAL THERAPY ADULT  Goal: Performs self-care activities at highest level of function for planned discharge setting  See evaluation for individualized goals  Treatment Interventions: ADL retraining, Functional transfer training, UE strengthening/ROM, Endurance training, Cognitive reorientation, Patient/family training, Equipment evaluation/education, Compensatory technique education, Activityengagement, Energy conservation, Fine motor coordination activities  Equipment Recommended:  (use of SC and RW )       See flowsheet documentation for full assessment, interventions and recommendations  Outcome: Progressing  Limitation: Decreased ADL status, Decreased UE ROM, Decreased UE strength, Decreased Safe judgement during ADL, Decreased endurance, Decreased self-care trans, Decreased high-level ADLs  Prognosis: Good  Assessment: pt participated in am ot session and was seen focusing on full am care, bed mobility, education on rw safety with various trnasfers and standing tolerence / balance  pt was educated on c collar  management and padding was changed  pt requires total asst for this management at this time secondary to numbness in b hands  pt reports limited asst from sister upon eventual d/c  pt resides alone and is limited by numbness in b hands  prior pt used visual cues for  function with b hands which is now limited secondaryto c collar / cervical precautions pt requires physical asstfor grooming and c collar management as well as heavy iadls / homemaking question if pt has available asst from family / friends   pt may require in pt rehab to incrase independence     OT Discharge Recommendation: Other (Comment) (home first floor set up and adl assist vs in pt rehab  )  OT - OK to Discharge: Yes (when medically cleared )  April A MAC Steel

## 2018-08-18 NOTE — PHYSICAL THERAPY NOTE
Physical Therapy Progress Note     08/18/18 [facility]   Pain Assessment   Pain Assessment 0-10   Pain Score 6   Pain Type Surgical pain   Pain Location Neck   Pain Orientation Right   Hospital Pain Intervention(s) Medication (See MAR); Repositioned; Ambulation/increased activity   Restrictions/Precautions   Weight Bearing Precautions Per Order No   Braces or Orthoses C/S Collar   Other Precautions Fall Risk;Pain;Spinal precautions   General   Chart Reviewed Yes   Response to Previous Treatment Patient with no complaints from previous session  Family/Caregiver Present No   Cognition   Overall Cognitive Status WFL   Arousal/Participation Alert; Responsive; Cooperative   Attention Within functional limits   Orientation Level Oriented X4   Memory Within functional limits   Following Commands Follows all commands and directions without difficulty   Transfers   Sit to Stand 4  Minimal assistance   Additional items Assist x 1; Increased time required;Verbal cues   Stand to Sit 4  Minimal assistance   Additional items Assist x 1; Increased time required;Verbal cues   Ambulation/Elevation   Gait pattern Improper Weight shift;Decreased foot clearance;Shuffling;Narrow AURORA; Short stride; Excessively slow   Gait Assistance 4  Minimal assist   Additional items Assist x 1;Verbal cues   Assistive Device Rolling walker   Distance 360'   Stair Management Assistance 4  Minimal assist   Additional items Assist x 1;Verbal cues; Tactile cues; Increased time required   Stair Management Technique One rail R;One rail L;Foreward; Step to pattern   Number of Stairs 7   Endurance Deficit   Endurance Deficit Yes   Activity Tolerance   Activity Tolerance Patient limited by fatigue;Patient limited by pain   Nurse Made Aware JUAN R zamora to see   Assessment   Prognosis Good   Problem List Decreased strength;Decreased range of motion;Decreased endurance; Impaired balance;Decreased mobility; Decreased coordination;Pain;Orthopedic restrictions   Assessment Pt supine in bed upon arrival, reports increased neck pain on his right side  Pt requires supervision only for bed mobility, Min assist for sit to stand transfers due to LOB  Pt ambulates with RW and Min assist 360' reports having difficulty steering RW due to loose  on RW states is he  too hard the looses feeling in his hands, relayed this to neurosurgery PA present  Pt was able to negotiate 7 steps with use on 1 rail and a step to pattern, LOB x1 w/ stairs  Pt was returned to room and was left with WATTS to wash in bathroom  Pt will continue to benefit from skilled PT to furhter increase his strength and enduracne and improve balance in order to maximize safe fucntional mobility  Barriers to Discharge Decreased caregiver support   Goals   Patient Goals to go to rehab   STG Expiration Date 08/27/18   Short Term Goal #1 1  Modified Independent with Bed Mobility Rolling Right and Left     2  Modified Independent with Bed Mobility Supine-Sit     3  Modified Independent with Transfer Bed-Chair     4  Increase Dynamic Sitting Balance at least 1 Grade for improved stability with functional reach activities     5  Increase Dynamic Standing Balance at least 1 Grade for improved ease with Activities of Daily Living     6  Increase Lower Extremity Strength at least 1 Grade for improved ease mobility tasks     7  Modified Independent with  Ambulation 150 feet using RW Ccollar donned to facilitate home and community mobility 8  Modified Independent with Ascending/Descending 15 steps to facilitate home and community accessibility  9  3/3x execution of Cspine precautions w funct mob tasks; 10  Independent w directing care re Ccollar alignment don/doff   Plan   Treatment/Interventions Functional transfer training;LE strengthening/ROM; Elevations; Therapeutic exercise; Endurance training;Bed mobility;Gait training   Progress Progressing toward goals   PT Frequency (3-6x/week)   Recommendation   Recommendation (IP rehab) Equipment Recommended Walker  (rolling walker)   PT - OK to Discharge Yes  (to rehab when medically ready)     Terrence Reynaga, PTA

## 2018-08-18 NOTE — PROGRESS NOTES
Progress Note - Neurosurgery   Rafael Cassidy 66 y o  male MRN: [id number]  Unit/Bed#: [facility] 908-01 Encounter: [id number]    Assessment:  1  POD #2  Posterior cervical decompression/laminectomy C3-5, superior 6, fixation fusion C2- T1  2  Myelomalacia  3  History of cervical spinal cord compression  4  History of cervical spondylosis with myelipathy  5  Hyponatremia -- outpatient record indicated history of chronic hyponatremia  6  Leukocytosis    Plan:  · Imaging  Final results as below  · C-spine Xay (8/17/18) Posterior fusion C2 through T1  Alignment is anatomic  Multilevel laminectomy present  Moderate degenerative changes present at C6-C7  · MARGARITA drain -- maintain until decreased output  [ 165ml/24 hr (115ml  (from 8/17 0701-1900hr) ; 50ml  (from 8/17 1901hr - 8/18 0700hr) ;  50ml of bright reg blood currently in MARGARITA drain  ]  · C-collar at all times  Terrial Valdemar for shower -- ordered  Otherwise Vista at all times)  · Pain control  · APAP 975 q 8  · Oxycodone 5mg q 4 hrs prn moderate pain or 10mg q 4 hrs prn severe pain( used 10mg at 0645)  · Dilaudid 0 5mg q 1 hr prn breakthrough pain (has not used today)  · Methocabramol 750mg 1 tab QID prn muscle spasm  (used twice yesterday; none today) -- patient made aware and encouraged to utilize  · Consult SLIM for medical management / chronic hyponatremia  · Repeat CBC and BMP today    · Mobilize with therapy  Physical therapy 8/17/18  Recommending IP Rehab vs  HHPT/Home with assistance depending on progress and support  PT recommending walker  · DVT PPX: on Lovenox 40 sq daily;  SCD's for lower extremities  · Dispo --  Spoke with Physical therapy today who is recommended patient be discharge to IP rehab at time of hospital discharge  PT recommend roller walker  Case Management on board      Subjective/Objective   Chief Complaint: "Neck soreness"    Subjective:  Patient reports posterior cervical/trapezius neck soreness bilaterally -- currently rated 6-7/10 on pain scale  Denies radicular pain down upper extremities  He reports numbness in both upper extremities and weakness of both upper extremities are unchanged from his preoperative baseline  Reports he has been out of bed ambulating with walker with assistance to bathroom  No bladder or bowel incontinence  Reports voiding urine without issue and feels he empties his bladder when he voids  Denies any bowel movement since surgery  Reports passing flatus  Denies nausea, vomiting, chest pain, or shortness of breath  Reports tolerating PO diet  Patient reports he lives along at home  Objective: Standing at bedside with walker in presence of PT service  I/O       08/16 0701 - 08/17 0700 08/17 0701 - 08/18 0700 08/18 0701 - 08/19 0700    P  O  480 722     I V  (mL/kg) 3335 (54) 828 3 (13 4)     IV Piggyback 50      Total Intake(mL/kg) 3865 (62 5) 1550 3 (25 1)     Urine (mL/kg/hr) 2650 (1 8) 1275 (0 9)     Drains 390 165     Total Output 3040 1440      Net +825 +110 3             Unmeasured Urine Occurrence  1 x           Invasive Devices     Peripheral Intravenous Line            Peripheral IV 08/16/18 Left Forearm 2 days    Peripheral IV 08/16/18 Right Hand 2 days          Drain            Closed/Suction Drain Right;Posterior Neck Bulb 7 Fr  1 day                Physical Exam:  Vitals: Blood pressure 165/80, pulse 58, temperature 98 1 °F (36 7 °C), temperature source Oral, resp  rate 17, height 5' 9" (1 753 m), weight 61 8 kg (136 lb 3 9 oz), SpO2 98 %  ,Body mass index is 20 12 kg/m²  General appearance: alert, appears stated age, cooperative and no distress  Wearing vista c-collar  Head: Normocephalic, without obvious abnormality, atraumatic  Neck: Posterior cervical dressing removed -- old dried blood on dressing  Posterior cervical incision is C/D/I   MARGARITA drain in place  Muscle spasm of right trapezius  Lungs: CTA b/l  Non labored breathing  Heart: RRR   +S1 and S2      Neurologic: Mental status: Alert, oriented x3, thought content appropriate  Sensory: normal to light touch of b/u UEs and LEs  Motor:  Shoulder abduction 5/5 bilaterally  Elbow flexion/extension 5/5 bilaterally  Wrist flexion/extension 5/5 bilaterally  Finger  4/5 bilaterally  Hip flexion 5/5 bilaterally  Knee flexion/extension 5/5 bilaterally  Ankle dorsiflexion/plantar flexion 5/5 bilaterally  Great toe DF 5/5 bilaterallly    Reflexes: Biceps +2-3 bilaterally,  Triceps left +2-3 and right +3  BR left +2 and right +3  Patellar +3 bilaterally  Achilles left +1 and right +2  No ankle clonus bilaterally  Salazar's present bilaterally  JPS intact 3/3 left thumb and both great toes  JPS intact 2/3 right thumb  Vibratory sense intact b/l thumbs and great toes  Observed standing with walker at bedside  Lab Results:    Results from last 7 days  Lab Units 08/17/18  0442   WBC Thousand/uL 10 50*   HEMOGLOBIN g/dL 11 1*   HEMATOCRIT % 32 5*   PLATELETS Thousands/uL 160       Results from last 7 days  Lab Units 08/17/18  0442 08/16/18  0606   SODIUM mmol/L 132* 129*   POTASSIUM mmol/L 3 8 3 9   CHLORIDE mmol/L 101 95*   CO2 mmol/L 25 23   BUN mg/dL 10 6   CREATININE mg/dL 0 72 0 63   CALCIUM mg/dL 8 3 9 2   GLUCOSE RANDOM mg/dL 98 101                 No results found for: TROPONINT  ABG:No results found for: PHART, WOG4YTC, PO2ART, BMS4HLK, H7QGNVGC, BEART, SOURCE    Imaging Studies: I have personally reviewed pertinent reports     and I have personally reviewed pertinent films in PACS      VTE Pharmacologic Prophylaxis: Enoxaparin (Lovenox)    VTE Mechanical Prophylaxis: sequential compression device

## 2018-08-19 ENCOUNTER — APPOINTMENT (INPATIENT)
Dept: RADIOLOGY | Facility: HOSPITAL | Age: 63
DRG: 321 | End: 2018-08-19
Payer: COMMERCIAL

## 2018-08-19 LAB
OSMOLALITY UR: 443 MMOL/KG
SODIUM 24H UR-SCNC: 117 MOL/L

## 2018-08-19 PROCEDURE — 84300 ASSAY OF URINE SODIUM: CPT | Performed by: INTERNAL MEDICINE

## 2018-08-19 PROCEDURE — 99253 IP/OBS CNSLTJ NEW/EST LOW 45: CPT | Performed by: INTERNAL MEDICINE

## 2018-08-19 PROCEDURE — 83935 ASSAY OF URINE OSMOLALITY: CPT | Performed by: INTERNAL MEDICINE

## 2018-08-19 PROCEDURE — 72040 X-RAY EXAM NECK SPINE 2-3 VW: CPT

## 2018-08-19 PROCEDURE — 99024 POSTOP FOLLOW-UP VISIT: CPT | Performed by: PHYSICIAN ASSISTANT

## 2018-08-19 RX ORDER — POLYETHYLENE GLYCOL 3350 17 G/17G
17 POWDER, FOR SOLUTION ORAL DAILY PRN
Status: DISCONTINUED | OUTPATIENT
Start: 2018-08-19 | End: 2018-08-23 | Stop reason: HOSPADM

## 2018-08-19 RX ADMIN — SENNOSIDES AND DOCUSATE SODIUM 2 TABLET: 8.6; 5 TABLET ORAL at 09:08

## 2018-08-19 RX ADMIN — SOTALOL HYDROCHLORIDE 120 MG: 120 TABLET ORAL at 17:49

## 2018-08-19 RX ADMIN — LOSARTAN POTASSIUM 100 MG: 50 TABLET ORAL at 09:08

## 2018-08-19 RX ADMIN — SOTALOL HYDROCHLORIDE 120 MG: 120 TABLET ORAL at 09:14

## 2018-08-19 RX ADMIN — METHOCARBAMOL TABLETS 750 MG: 750 TABLET, COATED ORAL at 09:09

## 2018-08-19 RX ADMIN — ACETAMINOPHEN 975 MG: 325 TABLET ORAL at 05:31

## 2018-08-19 RX ADMIN — ENOXAPARIN SODIUM 40 MG: 40 INJECTION SUBCUTANEOUS at 09:09

## 2018-08-19 RX ADMIN — ACETAMINOPHEN 975 MG: 325 TABLET ORAL at 14:58

## 2018-08-19 RX ADMIN — ACETAMINOPHEN 975 MG: 325 TABLET ORAL at 21:25

## 2018-08-19 RX ADMIN — PANTOPRAZOLE SODIUM 40 MG: 40 TABLET, DELAYED RELEASE ORAL at 05:31

## 2018-08-19 NOTE — CONSULTS
Inpatient Medical Consultation - [facility]    Patient Information: Justin Spear 66 y o  male MRN: [id number]  Unit/Bed#: [facility] 908-01 Encounter: [id number]  PCP: Judi Canchola MD  Date of Admission:  8/16/2018  Date of Consultation: 08/19/18  Requesting Physician: Edel Menchaca MD    Reason For Consultation:   Hyponatremia    Assessment/Plan:    · Chronic Hyponatremia - In medical problem list, this is noted to have been a problem even back in 2014  Will add a fluid restriction  Get osmolality studies  Recheck BMP in am and adjust therapy based on response to these adjustments and the osmolality study results  We could contact patient's PCP office tomorrow to determine baseline sodium and obtain additional information of this history  · Essential Hypertension - blood pressures overall controlled  Continue current treatment regimen  · Acute Blood Loss Anemia - check iron studies  CBC in am   · Constipation - already on senna / docusate combo  Will add PRN miralax if needed  · Cervical Spondylosis with Cervical Spinal Cord Compression -   · S/p posterior cervical decompression / laminectomy C3 - C5, superior C6, Fixation fusion C2 - T1   · Pain Control  Continue current pain medication regimen and monitor pain levels  · C - collar at all times  · Maintain MARGARITA drain  VTE Prophylaxis: Enoxaparin (Lovenox)  / sequential compression device     Recommendations for Discharge:  · Dependent on response of sodium, will determine discharge recommendations  Counseling / Coordination of Care Time: 30 minutes  Greater than 50% of total time spent on patient counseling and coordination of care  Collaboration of Care:  Were Recommendations Directly Discussed with Primary Treatment Team? - No     History of Present Illness:    Justin Spear is a 66 y o  male who is originally admitted to the Neurosurgery service on 8/16/2018 due to cervical stenosis with cord compression requiring laminectomy and decompression procedure of the cervical spine  We are consulted post operatively for hyponatremia  On basis of chart review, some component of hyponatremia is chronically present at least since 2014  The patient's hyponatremia is mild and he has no symptoms  In terms of PO intake, he states that he is eating well but will hold off on dinner tonight because of some constipation that he is having  The patient does not think he overdrinks, but does state that he loves salting his food often feeling that the packets of salt that he is given in hospital is not enough  The patient does not appear to be on any medications currently that would cause hyponatremia  Review of Systems:    Review of Systems   Constitutional: Negative for activity change, appetite change, chills, fever and unexpected weight change  HENT: Negative for congestion, rhinorrhea, sneezing and sore throat  Eyes: Negative  Respiratory: Negative for cough, shortness of breath and wheezing  Cardiovascular: Negative for chest pain, palpitations and leg swelling  Gastrointestinal: Positive for constipation  Negative for abdominal pain, diarrhea, nausea and vomiting  Endocrine: Negative  Genitourinary: Negative for decreased urine volume, difficulty urinating, dysuria and frequency  Musculoskeletal: Positive for neck pain  Negative for arthralgias and back pain  Skin: Negative  Allergic/Immunologic: Negative  Neurological: Negative for dizziness, tremors, weakness, light-headedness, numbness and headaches  Hematological: Negative  Psychiatric/Behavioral: Negative  All other systems reviewed and are negative        Past Medical and Surgical History:     Past Medical History:   Diagnosis Date    A-fib [facility]) 01/01/2005    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 03/10/2017    Anemia 12/31/2015    Anxiety     Back ache 08/31/2017    Diarrhea 08/31/2017    Diverticula of colon 01/01/2012    Family history of prostate cancer     Father    GERD (gastroesophageal reflux disease)     GERD with esophagitis 08/31/2017    Gout 08/31/2017    Hordeolum externum of lower eyelid 08/31/2017    Hyperlipidemia 05/01/2017    Hypertension, essential 05/01/2017    Hyponatremia 07/10/2014    Loss of taste 05/01/2017    Muscle strain 08/31/2017    Osteoarthritis     Paresthesia 06/08/2018    Raised prostate specific antigen 05/01/2017    Skin lesion 03/08/2018    Spinal stenosis of lumbar region 02/06/2017    Steatosis of liver 08/31/2017    Weight loss, unintentional 08/31/2017       Past Surgical History:   Procedure Laterality Date    BACK SURGERY      CHEST TUBE INSERTION  1980    Insertion and removal / collapsed lung    COLONOSCOPY      OTHER SURGICAL HISTORY  03/10/2017    Abscess removed from L5 & S1    MN ARTHRODESIS POSTERIOR/POSTERIORLATERAL CERVICAL BELOW C2 N/A 8/16/2018    Procedure: Posterior cervical decompression/laminectomy C3-5, superior 6, fixation fusion C2- T1;  Surgeon: Edel Menchaca MD;  Location: [facility];  Service: Neurosurgery       Meds/Allergies:    all medications and allergies reviewed    Allergies: No Known Allergies    Social History:     Marital Status: Single    Substance Use History:   History   Alcohol Use    3 0 oz/week    5 Cans of beer per week     Comment: Beer     History   Smoking Status    Former Smoker    Types: Cigarettes    Quit date: 1980   Smokeless Tobacco    Never Used     History   Drug Use No       Family History:    non-contributory    Physical Exam:     Vitals:   Blood Pressure: 138/77 (08/19/18 0843)  Pulse: 71 (08/19/18 0843)  Temperature: 98 2 °F (36 8 °C) (08/19/18 0843)  Temp Source: Oral (08/19/18 0843)  Respirations: 16 (08/19/18 0843)  Height: 5' 9" (175 3 cm) (08/16/18 0550)  Weight - Scale: 61 8 kg (136 lb 3 9 oz) (08/17/18 0617)  SpO2: 98 % (08/19/18 0843)    Physical Exam   Constitutional: He is oriented to person, place, and time   No distress  HENT:   Mouth/Throat: Oropharynx is clear and moist  No oropharyngeal exudate  Neck:   c collar being worn - limited exam    Cardiovascular: Normal rate and regular rhythm  No murmur heard  Pulmonary/Chest: Effort normal and breath sounds normal  He has no wheezes  He has no rales  Abdominal: Soft  Bowel sounds are normal  He exhibits no distension  There is no tenderness  Musculoskeletal: He exhibits no edema or tenderness  Neurological: He is alert and oriented to person, place, and time  No cranial nerve deficit  He exhibits normal muscle tone  Skin: Skin is warm and dry  No rash noted  He is not diaphoretic  Good skin turgor  Psychiatric: He has a normal mood and affect  Vitals reviewed  Additional Data:     Lab Results: I have personally reviewed pertinent reports  Results from last 7 days  Lab Units 08/18/18  1121   WBC Thousand/uL 7 43   HEMOGLOBIN g/dL 10 8*   HEMATOCRIT % 32 1*   PLATELETS Thousands/uL 167       Results from last 7 days  Lab Units 08/18/18  1121   SODIUM mmol/L 132*   POTASSIUM mmol/L 3 9   CHLORIDE mmol/L 99*   CO2 mmol/L 28   BUN mg/dL 8   CREATININE mg/dL 0 78   CALCIUM mg/dL 8 4   GLUCOSE RANDOM mg/dL 94           Imaging: I have personally reviewed pertinent reports  Xr Cervical Spine 2 Or 3 Views    Result Date: 8/17/2018  Narrative: CERVICAL SPINE INDICATION: s/p C2-T1 fusion COMPARISON:  Fluoroscopy 8/16/2018  EXAM PERFORMED/VIEWS:  XR SPINE CERVICAL 2 OR 3 VW INJURY IMAGES:  2 FINDINGS: Patient is status post posterior fusion from C2 through T1  Alignment is anatomic  Multilevel laminectomy noted  Moderate degenerative changes present at C6-7  Posterior skin staples are present  Soft tissue drain is noted        Impression: Status post posterior fusion from C2 through T1  Workstation performed: [id number]     Xr Spine Cervical 2 Or 3 Vw Injury    Result Date: 8/16/2018  Narrative: C-ARM -  Spinal cord compression ([facility] [address] ) [G95 20]  Intraoperative fluoroscopic guidance for cervical spine surgery INDICATION: Spinal cord compression ([facility]) [G95 20]  Procedure guidance  COMPARISON:  None TECHNIQUE: FLUOROSCOPY TIME:   1 minute 5 FLUOROSCOPIC IMAGES FINDINGS: Fluoroscopic guidance provided for surgical procedure  Level verification was called to the OR at the time of image acquisition, as per Department protocol  Radiodense probe noted posteriorly at the C2 vertebral body  Subsequent images demonstrate posterior fusion hardware C2-T1 Osseous and soft tissue detail limited by technique  Impression: Fluoroscopic guidance provided for surgical procedure  Please refer to the separate procedure notes for additional details  Workstation performed: [id number]     ** Please Note: This note has been constructed using a voice recognition system   **

## 2018-08-20 PROBLEM — E87.1 HYPONATREMIA: Status: ACTIVE | Noted: 2018-08-20

## 2018-08-20 PROBLEM — K59.01 SLOW TRANSIT CONSTIPATION: Status: ACTIVE | Noted: 2018-08-20

## 2018-08-20 PROBLEM — D62 ACUTE BLOOD LOSS ANEMIA: Status: ACTIVE | Noted: 2018-08-20

## 2018-08-20 PROBLEM — I10 BENIGN ESSENTIAL HTN: Status: ACTIVE | Noted: 2018-08-20

## 2018-08-20 LAB
ANION GAP SERPL CALCULATED.3IONS-SCNC: 6 MMOL/L (ref 4–13)
BUN SERPL-MCNC: 6 MG/DL (ref 5–25)
CALCIUM SERPL-MCNC: 8.3 MG/DL (ref 8.3–10.1)
CHLORIDE SERPL-SCNC: 100 MMOL/L (ref 100–108)
CO2 SERPL-SCNC: 27 MMOL/L (ref 21–32)
CREAT SERPL-MCNC: 0.57 MG/DL (ref 0.6–1.3)
ERYTHROCYTE [DISTWIDTH] IN BLOOD BY AUTOMATED COUNT: 11.9 % (ref 11.6–15.1)
FERRITIN SERPL-MCNC: 236 NG/ML (ref 8–388)
GFR SERPL CREATININE-BSD FRML MDRD: 111 ML/MIN/1.73SQ M
GLUCOSE SERPL-MCNC: 93 MG/DL (ref 65–140)
HCT VFR BLD AUTO: 30.9 % (ref 36.5–49.3)
HGB BLD-MCNC: 10.4 G/DL (ref 12–17)
IRON SATN MFR SERPL: 14 %
IRON SERPL-MCNC: 36 UG/DL (ref 65–175)
MCH RBC QN AUTO: 32.3 PG (ref 26.8–34.3)
MCHC RBC AUTO-ENTMCNC: 33.7 G/DL (ref 31.4–37.4)
MCV RBC AUTO: 96 FL (ref 82–98)
OSMOLALITY UR/SERPL-RTO: 273 MMOL/KG (ref 282–298)
PLATELET # BLD AUTO: 164 THOUSANDS/UL (ref 149–390)
PMV BLD AUTO: 10.6 FL (ref 8.9–12.7)
POTASSIUM SERPL-SCNC: 3.6 MMOL/L (ref 3.5–5.3)
RBC # BLD AUTO: 3.22 MILLION/UL (ref 3.88–5.62)
SODIUM SERPL-SCNC: 133 MMOL/L (ref 136–145)
TIBC SERPL-MCNC: 260 UG/DL (ref 250–450)
WBC # BLD AUTO: 5.32 THOUSAND/UL (ref 4.31–10.16)

## 2018-08-20 PROCEDURE — 99232 SBSQ HOSP IP/OBS MODERATE 35: CPT | Performed by: NURSE PRACTITIONER

## 2018-08-20 PROCEDURE — 83540 ASSAY OF IRON: CPT | Performed by: INTERNAL MEDICINE

## 2018-08-20 PROCEDURE — 85027 COMPLETE CBC AUTOMATED: CPT | Performed by: INTERNAL MEDICINE

## 2018-08-20 PROCEDURE — 80048 BASIC METABOLIC PNL TOTAL CA: CPT | Performed by: INTERNAL MEDICINE

## 2018-08-20 PROCEDURE — 99024 POSTOP FOLLOW-UP VISIT: CPT | Performed by: PHYSICIAN ASSISTANT

## 2018-08-20 PROCEDURE — 97535 SELF CARE MNGMENT TRAINING: CPT

## 2018-08-20 PROCEDURE — 97112 NEUROMUSCULAR REEDUCATION: CPT

## 2018-08-20 PROCEDURE — 82728 ASSAY OF FERRITIN: CPT | Performed by: INTERNAL MEDICINE

## 2018-08-20 PROCEDURE — 83930 ASSAY OF BLOOD OSMOLALITY: CPT | Performed by: INTERNAL MEDICINE

## 2018-08-20 PROCEDURE — 83550 IRON BINDING TEST: CPT | Performed by: INTERNAL MEDICINE

## 2018-08-20 PROCEDURE — 97116 GAIT TRAINING THERAPY: CPT

## 2018-08-20 RX ORDER — FERROUS SULFATE 325(65) MG
325 TABLET ORAL
Status: DISCONTINUED | OUTPATIENT
Start: 2018-08-21 | End: 2018-08-23 | Stop reason: HOSPADM

## 2018-08-20 RX ADMIN — ACETAMINOPHEN 975 MG: 325 TABLET ORAL at 05:35

## 2018-08-20 RX ADMIN — ACETAMINOPHEN 975 MG: 325 TABLET ORAL at 13:48

## 2018-08-20 RX ADMIN — SOTALOL HYDROCHLORIDE 120 MG: 120 TABLET ORAL at 09:33

## 2018-08-20 RX ADMIN — METHOCARBAMOL TABLETS 750 MG: 750 TABLET, COATED ORAL at 09:33

## 2018-08-20 RX ADMIN — SENNOSIDES AND DOCUSATE SODIUM 2 TABLET: 8.6; 5 TABLET ORAL at 09:32

## 2018-08-20 RX ADMIN — ACETAMINOPHEN 975 MG: 325 TABLET ORAL at 21:55

## 2018-08-20 RX ADMIN — ENOXAPARIN SODIUM 40 MG: 40 INJECTION SUBCUTANEOUS at 09:33

## 2018-08-20 RX ADMIN — OXYCODONE HYDROCHLORIDE 5 MG: 5 TABLET ORAL at 13:49

## 2018-08-20 RX ADMIN — SOTALOL HYDROCHLORIDE 120 MG: 120 TABLET ORAL at 19:22

## 2018-08-20 RX ADMIN — LOSARTAN POTASSIUM 100 MG: 50 TABLET ORAL at 09:32

## 2018-08-20 RX ADMIN — PANTOPRAZOLE SODIUM 40 MG: 40 TABLET, DELAYED RELEASE ORAL at 05:35

## 2018-08-20 NOTE — ASSESSMENT & PLAN NOTE
· POD #3 Posterior cervical decompression/laminectomy C3-5, superior 6, fixation fusion C2- T1  · NSX managing  · Pain control   · c-collar at all times   · PT- recommending IP rehab

## 2018-08-20 NOTE — ASSESSMENT & PLAN NOTE
· hgb stable at 10 4  · Iron level 36; ferritin 236; iron sat 14; tibc 260  · Start ferrous sulfate QD

## 2018-08-20 NOTE — PLAN OF CARE
Problem: PHYSICAL THERAPY ADULT  Goal: Performs mobility at highest level of function for planned discharge setting  See evaluation for individualized goals  Treatment/Interventions: Functional transfer training, LE strengthening/ROM, Elevations, Therapeutic exercise, Endurance training, Patient/family training, Cognitive reorientation, Equipment eval/education, Bed mobility, Gait training, Compensatory technique education, Continued evaluation, Spoke to nursing, Spoke to case management, Family  Equipment Recommended: Kinsey Robin       See flowsheet documentation for full assessment, interventions and recommendations  Prognosis: Good  Problem List: Decreased strength, Decreased endurance, Impaired balance, Decreased range of motion, Decreased mobility, Decreased coordination, Impaired judgement, Decreased safety awareness, Pain, Orthopedic restrictions  Assessment: pt cont to progress w mob and funct activity tolerance; exhobots stable gait amb w RW - trial progression to [facility] - pt appropriate for amb short household dist w cane however exhibits LE musculoskeletal fatigue w prolonged exertion then warranting use of RW for gait stability; initiated bal retraining to restore funct indep- performed alternate high step marches, bilat siidestepping, backward walking, heel-toe raises w mild manual support for kinesthetic feedback from UE; pt also performed chair sit-stand wo support, tends to poster LOB, able to self correct w calves braced against chair; pt will benefit from cont PT; in d/w pt and OT, pt currently still has limitations w indep collar adjustment and optimal fitting, PT verified w trial; pt has limited home support, current safe plan for d/c when med cleared is IP rehab; pt in agreeamenr  Barriers to Discharge: Decreased caregiver support     Recommendation: Post acute IP rehab     PT - OK to Discharge: Yes    See flowsheet documentation for full assessment

## 2018-08-20 NOTE — PROGRESS NOTES
Progress Note - Neurosurgery   Mark Root 66 y o  male MRN: [id number]  Unit/Bed#: [facility] 908-01 Encounter: [id number]    Assessment:  1  POD #3  Posterior cervical decompression/laminectomy C3-5, superior 6, fixation fusion C2- T1  2  Myelomalacia  3  History of cervical spinal cord compression  4  History of cervical spondylosis with myelipathy  5  Hyponatremia -- outpatient record indicated history of chronic hyponatremia  6  Leukocytosis   7  Acute blood loss anemia      Plan:  · Referred for updated C-spine xray (pending) as patient reports he hears a "squeak" sometimes with he moves his neck and is concerned about the stability of instrumentation  · Imaging  Final results as below  ? C-spine Xay (8/17/18) Posterior fusion C2 through T1  Alignment is anatomic  Multilevel laminectomy present  Moderate degenerative changes present at C6-C7  · MARGARITA drain -- daily total from yesterday 263ml; 80ml so far day shift today  Maintain MARGARITA but change to gravity  Likely remove MARGARITA tomorrow  · C-collar at all times  Vernida Livers for shower -- ordered  Otherwise [place] at all times)  · Pain control  ? APAP 975 q 8  ? Oxycodone 5mg q 4 hrs prn moderate pain or 10mg q 4 hrs prn severe pain  ? Dilaudid 0 5mg q 1 hr prn breakthrough pain  Methocabramol 750mg 1 tab QID prn muscle spasm     · Appreciate SLIM consult for medical management / chronic hyponatremia  · On fluid restriction  · Osmolality 443 ; urine sodium 117;   · Repeat BMP / CBC in AM  · Iron studies pending for ABLA  · On senna / docusate combo  Miralax added for consitpation  · Continue current antihypertensive tx  · Mobilize with therapy  PT recommending inpatient rehab at discharge  · DVT PPX: on Lovenox 40 sq daily;  SCD's for lower extremities  · Dispo --  Spoke with Physical therapy today who is recommended patient be discharge to IP rehab at time of hospital discharge  PT recommend roller walker     Case Management on board        Subjective/Objective   Chief Complaint: "I hear a squeak sometimes when I move my neck"  Subjective: Patient reports he hears as "squeak" sometimes of his neck when he moves  He also describes a sharp right sided neck last night when he instinctively went to look down  He reports stable numbness in his hands and upper extremity strengths as pre-op  He feels his lower extremity extremities may have improved  Reports tolerating p o  Diet  Some difficulty with manipulating silverware but denies any difficulty holding sandwiches  Denies bladder dysfunction  Reports he is happy that he had a bowel movement today  Objective: Sitting in bed  NAD  Wearing [place] c-collar  I/O       08/18 0701 - 08/19 0700 08/19 0701 - 08/20 0700    P  O  1535 970    Total Intake(mL/kg) 1535 (24 8) 970 (15 7)    Urine (mL/kg/hr) 700 (0 5) 500 (0 6)    Drains 263 80    Total Output 963 580    Net +572 +390          Unmeasured Urine Occurrence 4 x 2 x          Invasive Devices     Peripheral Intravenous Line            Peripheral IV 08/16/18 Right Hand 3 days          Drain            Closed/Suction Drain Right;Posterior Neck Bulb 7 Fr  3 days                Physical Exam:  Vitals: Blood pressure 150/78, pulse 64, temperature 98 3 °F (36 8 °C), temperature source Oral, resp  rate 18, height 5' 9" (1 753 m), weight 61 8 kg (136 lb 3 9 oz), SpO2 99 %  ,Body mass index is 20 12 kg/m²  General appearance: alert, appears stated age, cooperative and no distress  Eyes: Conjugate  Neck: Posterior cervical dressing is intact with small amount of blood drainage towards mid/lower aspect of dressing  Lungs: non labored breathing  Neurologic:   Mental status: Alert, oriented 3, thought content appropriate  Cranial nerves: grossly intact (Cranial nerves II-XII)  Sensory: normal to LT grossly intact b/l UEs, torso, and LEs  JPS intact b/l thumbs and great toes  Motor:  Shoulder abduction 5/5 bilaterally    Elbow flexion/extension 5/5 bilaterally  Wrist flexion/extension 5/5 bilaterally  Finger  4/5 bilaterally  Hip flexion/abduction/adduction 5/5 bilaterally  Knee flexion/extension 5/5 bilaterally  Ankle DF/PF 5/5 bilaterally  Great toe DF 5/5 bilaterally  Reflexes: Biceps +2 bilaterally  Triceps Left +2 and right +1  Brachioradialis +2 bilaterally  Patellar brisk bilaterally  Achilles hyporeflexic bilaterally       Lab Results:    Results from last 7 days  Lab Units 08/18/18  1121 08/17/18  0442   WBC Thousand/uL 7 43 10 50*   HEMOGLOBIN g/dL 10 8* 11 1*   HEMATOCRIT % 32 1* 32 5*   PLATELETS Thousands/uL 167 160       Results from last 7 days  Lab Units 08/18/18  1121 08/17/18  0442 08/16/18  0606   SODIUM mmol/L 132* 132* 129*   POTASSIUM mmol/L 3 9 3 8 3 9   CHLORIDE mmol/L 99* 101 95*   CO2 mmol/L 28 25 23   BUN mg/dL 8 10 6   CREATININE mg/dL 0 78 0 72 0 63   CALCIUM mg/dL 8 4 8 3 9 2   GLUCOSE RANDOM mg/dL 94 98 101                 No results found for: TROPONINT  ABG:No results found for: PHART, GCQ3UEK, PO2ART, NRX6MFU, R2KPRNFT, BEART, SOURCE    VTE Pharmacologic Prophylaxis: Enoxaparin (Lovenox)    VTE Mechanical Prophylaxis: sequential compression device

## 2018-08-20 NOTE — OCCUPATIONAL THERAPY NOTE
OccupationalTherapy Progress Note     Patient Name: Joe Barba  Today's Date: 8/20/2018  Problem List  Patient Active Problem List   Diagnosis    Myelomalacia [facility])    Spinal cord compression ([address] )    Other spondylosis with myelopathy, cervical region           08/20/18 7377   Restrictions/Precautions   Weight Bearing Precautions Per Order No   Braces or Orthoses C/S Collar   Other Precautions Spinal precautions; Fall Risk;Pain;Multiple lines   Pain Assessment   Pain Assessment 0-10   Pain Score 7   Pain Type Acute pain;Surgical pain   Pain Location [facility] Pain Intervention(s) Repositioned; Ambulation/increased activity   Response to Interventions tolerated    ADL   Grooming Assistance 4  Minimal Assistance   Grooming Deficit Teeth care;Standing with assistive device; Increased time to complete;Supervision/safety;Verbal cueing;Steadying   UB Dressing Assistance 4  Minimal Assistance   UB Dressing Deficit Other (Comment)  (c-collar management )   Toileting Assistance  4  Minimal Assistance   Toileting Deficit Steadying;Grab bar use;Clothing management up;Clothing management down   Bed Mobility   Supine to Sit 5  Supervision   Additional items HOB elevated   Transfers   Sit to Stand 4  Minimal assistance  (contact guard )   Additional items Assist x 1; Increased time required;Verbal cues   Stand to Sit 4  Minimal assistance  (contact guard )   Additional items Assist x 1; Increased time required;Verbal cues   Functional Mobility   Functional Mobility 4  Minimal assistance  (contact guard )   Additional items Rolling walker   Toilet Transfers   Toilet Transfer From Rolling walker   Toilet Transfer Type To and from   Toilet Transfer to Standard toilet   Cognition   Overall Cognitive Status [facility]   Arousal/Participation Alert; Responsive; Cooperative   Attention Within functional limits   Orientation Level Oriented X4   Memory Within functional limits   Following Commands Follows all commands and directions without difficulty   Comments Pt is pleasant and cooperative to work with  Pt was able to recall spinal precautions at beginning of session and presented with G carryover of spinal precautions t/o session  Assessment   Assessment Pt seen for OT treatment session for bed mobility, functional transfers, functional mobility, oral care, c-collar management, activity tolerance, and problem solving c-collar management in order to self manipulate knob with adaptive strategies 2' deficits in sensation/ strength/coordination  Pt supine at beginning of session reporting 7/10 pain  Pt able to recall spinal precautions at beginning of session  Pt performed bed mobility at S level and sit <-> stand transfers/functional mobility at contact guard with use of RW  Pt performed toilet transfer/clothing management at MIN A with use of RW  Pt observed to have impaired sensation in B/L hands when pt grabbed bathroom grab bars instead of RW resulting in unsafe positioning of hand  Pt able to perform oral care with use of RW and with MIN A for steadying while standing at sink for approximately 5 minutes  Pt left in chair  Pt performed c-collar management at MIN A while sitting in chair  Pt required assistance for knob adjustment on the c-collar 2' deficits in sensation,  strength, and coordiation in B/L hands  Pt and therapist problem solved possible adaptive strategies  However, pt unable to adjust collar with adaptation  Pt left in chair at end of session with all items within reach  Pt is progressing and is motivated to regain independence  However, pt with increased frustation and fatigue toward end of session  Pt is still limited by spinal precautions, impaired sensation/ stength/coordination in B/L hands, decreased activity tolerance, and limited support  Will continue to F/U to address OT established goals   From an OT perspective, d/c recommendations continue to be home vs/ inpt rehab pending pt progress and support available upon d/c      Plan   Treatment Interventions ADL retraining;Functional transfer training;UE strengthening/ROM; Endurance training;Cognitive reorientation;Patient/family training;Equipment evaluation/education; Fine motor coordination activities; Compensatory technique education; Energy conservation; Activityengagement   Goal Expiration Date 08/27/18   Treatment Day 2   OT Frequency 3-5x/wk   Recommendation   OT Discharge Recommendation Short Term Rehab  (vs home with increased family support pending progress)   Equipment Recommended (use of SC and RW )   OT - OK to Discharge Yes  (when medically cleared )   Barthel Index   Feeding 10   Bathing 0   Grooming Score 5   Dressing Score 5   Bladder Score 10   Bowels Score 10   Toilet Use Score 5   Transfers (Bed/Chair) Score 10   Mobility (Level Surface) Score 0   Stairs Score 0   Barthel Index Score 55   Modified Heath Scale   Modified [place] Scale 4     Bev Squires, GUNNAR No

## 2018-08-20 NOTE — PLAN OF CARE
Problem: OCCUPATIONAL THERAPY ADULT  Goal: Performs self-care activities at highest level of function for planned discharge setting  See evaluation for individualized goals  Treatment Interventions: ADL retraining, Functional transfer training, UE strengthening/ROM, Endurance training, Cognitive reorientation, Patient/family training, Equipment evaluation/education, Compensatory technique education, Activityengagement, Energy conservation, Fine motor coordination activities  Equipment Recommended:  (use of SC and RW )       See flowsheet documentation for full assessment, interventions and recommendations  Outcome: Progressing  Limitation: Decreased ADL status, Decreased UE ROM, Decreased UE strength, Decreased Safe judgement during ADL, Decreased endurance, Decreased self-care trans, Decreased high-level ADLs  Prognosis: Good  Assessment: Pt seen for OT treatment session for bed mobility, functional transfers, functional mobility, oral care, c-collar management, activity tolerance, and problem solving c-collar management in order to self manipulate knob with adaptive strategies 2' deficits in sensation/ strength/coordination  Pt supine at beginning of session reporting 7/10 pain  Pt able to recall spinal precautions at beginning of session  Pt performed bed mobility at S level and sit <-> stand transfers/functional mobility at contact guard with use of RW  Pt performed toilet transfer/clothing management at MIN A with use of RW  Pt observed to have impaired sensation in B/L hands when pt grabbed bathroom grab bars instead of RW resulting in unsafe positioning of hand  Pt able to perform oral care with use of RW and with MIN A for steadying while standing at sink for approximately 5 minutes  Pt left in chair  Pt performed c-collar management at MIN A while sitting in chair  Pt required assistance for knob adjustment on the c-collar 2' deficits in sensation,  strength, and coordiation in B/L hands   Pt and therapist problem solved possible adaptive strategies  However, pt unable to adjust collar with adaptation  Pt left in chair at end of session with all items within reach  Pt is progressing and is motivated to regain independence  However, pt with increased frustation and fatigue toward end of session  Pt is still limited by spinal precautions, impaired sensation/ stength/coordination in B/L hands, decreased activity tolerance, and limited support  Will continue to F/U to address OT established goals   From an OT perspective, d/c recommendations continue to be home vs/ inpt rehab pending pt progress and support available upon d/c        OT Discharge Recommendation: Short Term Rehab (vs home with increased family support pending progress)  OT - OK to Discharge: Yes (when medically cleared )

## 2018-08-20 NOTE — SOCIAL WORK
Cm reviewed patient during care coordination rounds  Patient had updated PT/OT notes completed today and Cm sent to accepting facility, who is working on insurance authorization  Cm awaiting insurance determination

## 2018-08-20 NOTE — PROGRESS NOTES
Progress Note - Layne Santoro 1955, 66 y o  male MRN: [id number]    Unit/Bed#: [facility] 908-01 Encounter: [id number]    Primary Care Provider: Magui Guardado MD   Date and time admitted to hospital: 2018  5:16 AM        * Spinal cord compression (HCC)   Assessment & Plan    · POD #3 Posterior cervical decompression/laminectomy C3-5, superior 6, fixation fusion C2- T1  · NSX managing  · Pain control   · c-collar at all times   · PT- recommending IP rehab        Hyponatremia   Assessment & Plan    · Chronic   · Pt reported that per his last blood work with PCP his NA was 129  · Attempted call to PCP- message left to discuss baseline NA levels  · NA stable today at 133  · Urine ; urine osmo 443; serum osmo 273  · C/w 1800ml fluid restriction   · Check am bmp         Acute blood loss anemia   Assessment & Plan    · hgb stable at 10 4  · Iron level 36; ferritin 236; iron sat 14; tibc 260  · Start ferrous sulfate QD         Slow transit constipation   Assessment & Plan    · Resolved on bowel regimen         Benign essential HTN   Assessment & Plan    · Stable on sotalol and cozaar           VTE Pharmacologic Prophylaxis:   Pharmacologic: Enoxaparin (Lovenox)  Mechanical VTE Prophylaxis in Place: No      Education and Discussions with Family / Patient: d/w patient     Time Spent for Care: 30 minutes  More than 50% of total time spent on counseling and coordination of care as described above  Current Length of Stay: 4 day(s)    Current Patient Status: Inpatient   Certification Statement: The patient will continue to require additional inpatient hospital stay due to per [facility]    Discharge Plan: per NSX    Code Status: Level 1 - Full Code      Subjective:   Pt reports that he is comfortable at this time  Denies any complaints   Pt reports constipation resolved with bowel regimen yesterday     Objective:     Vitals:   Temp (24hrs), Av 1 °F (36 7 °C), Min:97 9 °F (36 6 °C), Max:98 3 °F (36 8 °C)    HR: [63-70] 63  Resp:  [18] 18  BP: (148-156)/(70-80) 148/80  SpO2:  [99 %] 99 %  Body mass index is 20 12 kg/m²  Input and Output Summary (last 24 hours): Intake/Output Summary (Last 24 hours) at 08/20/18 1210  Last data filed at 08/20/18 2288   Gross per 24 hour   Intake              770 ml   Output              585 ml   Net              185 ml       Physical Exam:     Physical Exam   Constitutional: He is oriented to person, place, and time  No distress  Neck:   c-collar in place    Cardiovascular: Normal rate, regular rhythm, normal heart sounds and intact distal pulses  No murmur heard  Pulmonary/Chest: Effort normal and breath sounds normal  No respiratory distress  He has no wheezes  He has no rales  Abdominal: Soft  Bowel sounds are normal  He exhibits no distension  There is no tenderness  There is no rebound  Musculoskeletal: He exhibits no edema or tenderness  Neurological: He is alert and oriented to person, place, and time  No cranial nerve deficit  Skin: Skin is warm and dry  No rash noted  He is not diaphoretic  No erythema  Psychiatric: He has a normal mood and affect  Additional Data:     Labs:      Results from last 7 days  Lab Units 08/20/18  0448   WBC Thousand/uL 5 32   HEMOGLOBIN g/dL 10 4*   HEMATOCRIT % 30 9*   PLATELETS Thousands/uL 164       Results from last 7 days  Lab Units 08/20/18  0448   SODIUM mmol/L 133*   POTASSIUM mmol/L 3 6   CHLORIDE mmol/L 100   CO2 mmol/L 27   BUN mg/dL 6   CREATININE mg/dL 0 57*   CALCIUM mg/dL 8 3   GLUCOSE RANDOM mg/dL 93           Results from last 7 days  Lab Units 08/16/18  1242   POC GLUCOSE mg/dl 149*             * I Have Reviewed All Lab Data Listed Above  * Additional Pertinent Lab Tests Reviewed:  All Labs Within Last 24 Hours Reviewed        Recent Cultures (last 7 days):           Last 24 Hours Medication List:     Current Facility-Administered Medications:  acetaminophen 650 mg Oral Q4H PRN Ambar Wolff MD acetaminophen 975 mg Oral [facility] Edelmira Post, MD   enoxaparin 40 mg Subcutaneous Daily Velta Post, MD   HYDROmorphone 0 5 mg Intravenous Q1H PRN Velta Post, MD   losartan 100 mg Oral Daily Velta Post, MD   methocarbamol 750 mg Oral 4x Daily PRN Velta Post, MD   naloxone 0 04 mg Intravenous Q1MIN PRN Velta Post, MD   ondansetron 4 mg Intravenous Q6H PRN Velta Post, MD   oxyCODONE 10 mg Oral Q4H PRN Velta Post, MD   oxyCODONE 5 mg Oral Q4H PRN Velta Post, MD   pantoprazole 40 mg Oral Early Morning Velta Post, MD   polyethylene glycol 17 g Oral Daily PRN Jojo Gordon DO   senna-docusate sodium 2 tablet Oral Daily Velta Post, MD   sotalol 120 mg Oral BID [place] Post, MD        Today, Patient Was Seen By: TESFAYE Granger    ** Please Note: Dictation voice to text software may have been used in the creation of this document   **

## 2018-08-20 NOTE — ASSESSMENT & PLAN NOTE
· Chronic   · Pt reported that per his last blood work with PCP his NA was 129  · Attempted call to PCP- message left to discuss baseline NA levels  · NA stable today at 133  · Urine ; urine osmo 443; serum osmo 273  · C/w 1800ml fluid restriction   · Check am bmp

## 2018-08-20 NOTE — PHYSICAL THERAPY NOTE
PT Progress Note     08/20/18 1045   Pain Assessment   Pain Assessment 0-10   Pain Score 4   Pain Type Acute pain   Pain Location Arm;Neck   Hospital Pain Intervention(s) Ambulation/increased activity   Response to Interventions unchanged   Restrictions/Precautions   Braces or Orthoses C/S Collar   Other Precautions Pain; Fall Risk;Spinal precautions   General   Chart Reviewed Yes   Family/Caregiver Present Yes   Cognition   Overall Cognitive Status WFL   Attention Within functional limits   Orientation Level Oriented to person;Oriented to place;Oriented to situation   Following Commands Follows multistep commands with increased time or repetition   Bed Mobility   Additional Comments bed mob NT- pt in chair on PT arrival   Transfers   Sit to Stand 5  Supervision   Additional items Increased time required;Verbal cues   Stand to Sit 5  Supervision   Additional items Increased time required;Verbal cues   Stand pivot 5  Supervision   Additional items Increased time required;Verbal cues  (RW)   Ambulation/Elevation   Gait pattern Improper Weight shift; Inconsistent catherine   Gait Assistance 5  Supervision   Additional items Verbal cues   Assistive Device Rolling walker   Distance 70 ft x2 100 ft 30 ft 140 ft   Balance   Static Standing Fair  (wo AD)   Dynamic Standing Fair -   Ambulatory (SPC)   Endurance Deficit   Endurance Deficit Yes   Activity Tolerance   Activity Tolerance Patient tolerated treatment well;Patient limited by fatigue   Nurse Made Aware yes   Exercises   Balance training  stand ex as noted below   Assessment   Prognosis Good   Problem List Decreased strength;Decreased endurance; Impaired balance;Decreased range of motion;Decreased mobility; Decreased coordination; Impaired judgement;Decreased safety awareness;Pain;Orthopedic restrictions   Assessment pt cont to progress w mob and funct activity tolerance; exhobots stable gait amb w RW - trial progression to [facility] - pt appropriate for amb short household dist w cane however exhibits LE musculoskeletal fatigue w prolonged exertion then warranting use of RW for gait stability; initiated bal retraining to restore funct indep- performed alternate high step marches, bilat siidestepping, backward walking, heel-toe raises w mild manual support for kinesthetic feedback from UE; pt also performed chair sit-stand wo support, tends to poster LOB, able to self correct w calves braced against chair; pt will benefit from cont PT; in d/w pt and OT, pt currently still has limitations w indep collar adjustment and optimal fitting, PT verified w trial; pt has limited home support, current safe plan for d/c when med cleared is IP rehab; pt in agreeamenr   Goals   Patient Goals go to rehab   STG Expiration Date 08/27/18   Short Term Goal #1 per PT assessment 8/17/18:1  Modified Independent with Bed Mobility Rolling Right and Left 2  Modified Independent with Bed Mobility Supine-Sit 3  Modified Independent with Transfer Bed-Chair 4  Increase Dynamic Sitting Balance at least 1 Grade for improved stability with functional reach activities 5  Increase Dynamic Standing Balance at least 1 Grade for improved ease with Activities of Daily Living 6  Increase Lower Extremity Strength at least 1 Grade for improved ease mobility tasks 7  Modified Independent with Ambulation 150 feet using RW Ccollar donned to facilitate home and community mobility 8  Modified Independent with Ascending/Descending 15 steps to facilitate home and community accessibility  9  3/3x execution of Cspine precautions w funct mob tasks; 10  Independent w directing care re Ccollar alignment don/doff   Plan   Treatment/Interventions Functional transfer training;LE strengthening/ROM; Elevations; Therapeutic exercise; Endurance training;Patient/family training;Cognitive reorientation;Equipment eval/education; Bed mobility;Gait training; Compensatory technique education;Continued evaluation;Spoke to nursing;Spoke to case management Progress Progressing toward goals   PT Frequency (3-6x/wk)   Recommendation   Recommendation Post acute IP rehab   Equipment Recommended Walker   PT - OK to Discharge Yes

## 2018-08-20 NOTE — PROGRESS NOTES
Progress Note - Neurosurgery   Christofer Shelton 66 y o  male MRN: [id number]  Unit/Bed#: [facility] 908-01 Encounter: [id number]        Assessment:  1  POD #4  Posterior cervical decompression/laminectomy C3-5, superior 6, fixation fusion C2- T1  2  Myelomalacia  3  History of cervical spinal cord compression  4  History of cervical spondylosis with myelipathy  5  Hyponatremia -- outpatient record indicated history of chronic hyponatremia  6  Leukocytosis   7  Acute blood loss anemia        Plan:    · EXAM: A&OX3, EOMI,  conjugate, finger-to-nose normal without pronator drift bilaterally  Motor strength 5/5, except subtle weakness in  strength in the right hand>left hand  Sensation to light touch is normal throughout, some tingling sensations in the right forearm  DTR 3+ more in the LE than the UE, without clonus and Media Chapin is negative bilaterally  · Imagings:  ? C-spine Xay (8/17/18) Posterior fusion C2 through T1   Alignment is anatomic   Multilevel laminectomy present   Moderate degenerative changes present at C6-C7   ? X-ray of cervical spine done on 08/19/2018 demonstrates Stable postoperative appearance of cervical spine  · MARGARITA drain --OP=35ml/ 16 hours,  removed today  · C-collar at all times    (Margie for shower -- ordered  Otherwise Vista at all times)  · Pain control  ? APAP 975 q 8  ? Oxycodone 5mg q 4 hrs prn moderate pain or 10mg q 4 hrs prn severe pain  ? Dilaudid 0 5mg q 1 hr prn breakthrough pain  Methocabramol 750mg 1 tab QID prn muscle spasm     · Appreciate SLIM consult for medical management / chronic hyponatremia  § On fluid restriction  § Osmolality (urine) 443  ; urine sodium 117;   § Repeat BMP / CBC in AM  § Iron studies pending for ABLA  § On senna / docusate combo  Miralax added for consitpation  § Continue current antihypertensive tx     · DVT PPX:  CONTINUE ON  · SCD's for lower extremities    · on Lovenox 40 sq daily;  · Dispo --  waiting for insurance approval for inpatient rehab, per   · Patient is  neurologically  nonfocal except slight decreased  strength in the  right hand  lower extremity strength  improved after surgery  Incision clean and dry  MARGARITA drain removed and area secured was suture  Postop cervical spine x-ray done yesterday appears stable, hardware is in intact and place        Subjective/Objective   Chief Complaint: "I have some shoulder blade pain" /postop progress note    Subjective:  Patient states he has  mild to moderate shoulder blade pain, denies any radiculopathy his extremities  He noticed some improvement with his lower extremity strength is during walking  He states his upper extremity paresthesia and some finger numbness and weakness stable  Denies fever, chills, rigors, headache, cough or chest pain  Denies swallowing issues  Denies any discharge or drainage from the incision site  Denies bowel or bladder issues, had  BM yesterday  His wearing [place] [employer] cervical collar and doing his physical therapy, walking around using walker  Objective:  Patient is supine in bed propped up in no pain distress cervical spin Vista collar in place    I/O       08/18 0701 - 08/19 0700 08/19 0701 - 08/20 0700 08/20 0701 - 08/21 0700    P  O  1535 970 340    Total Intake(mL/kg) 1535 (24 8) 970 (15 7) 340 (5 5)    Urine (mL/kg/hr) 700 (0 5) 500 (0 3)     Drains 263 80 35    Total Output 963 580 35    Net +572 +390 +305           Unmeasured Urine Occurrence 4 x 2 x 3 x    Unmeasured Stool Occurrence  1 x 1 x          Invasive Devices     Peripheral Intravenous Line            Peripheral IV 08/20/18 Left Forearm less than 1 day          Drain            Closed/Suction Drain Right;Posterior Neck Bulb 7 Fr  4 days                Physical Exam:  Vitals: Blood pressure 148/80, pulse 63, temperature 98 °F (36 7 °C), temperature source Oral, resp  rate 18, height 5' 9" (1 753 m), weight 61 8 kg (136 lb 3 9 oz), SpO2 99 %  ,Body mass index is 20 12 kg/m²  General appearance: alert, appears stated age, cooperative and no distress  Head: Normocephalic, without obvious abnormality, atraumatic  Eyes: EOMI, conjugate  Neck:  Incision clean and dry, MARGARITA removed and area secured with suture, all staples intact and in place  Lungs: non labored breathing  Heart: regular heart rate  Neurologic:   Mental status: Alert, oriented x3, thought content appropriate  Cranial nerves: grossly intact (Cranial nerves II-XII)  Sensory: normal to light touch throughout, some tingling feeling in the right forearm  Motor: moving all extremities without focal weakness, strength is 5/5 except, slight weakness in the right hand   Reflexes: 2+ and symmetric upper extremity, and 3+ in the lower extremities especially knees; without clonus and Babinski negative bilaterally  Coordination: finger to nose normal bilaterally, no drift bilaterally      Lab Results:    Results from last 7 days  Lab Units 08/20/18  0448 08/18/18  1121 08/17/18  0442   WBC Thousand/uL 5 32 7 43 10 50*   HEMOGLOBIN g/dL 10 4* 10 8* 11 1*   HEMATOCRIT % 30 9* 32 1* 32 5*   PLATELETS Thousands/uL 164 167 160       Results from last 7 days  Lab Units 08/20/18  0448 08/18/18  1121 08/17/18  0442   SODIUM mmol/L 133* 132* 132*   POTASSIUM mmol/L 3 6 3 9 3 8   CHLORIDE mmol/L 100 99* 101   CO2 mmol/L 27 28 25   BUN mg/dL 6 8 10   CREATININE mg/dL 0 57* 0 78 0 72   CALCIUM mg/dL 8 3 8 4 8 3   GLUCOSE RANDOM mg/dL 93 94 98                 No results found for: TROPONINT  ABG:No results found for: PHART, NMT4IOG, PO2ART, NNG8PHX, M2MJIXUS, BEART, SOURCE    Imaging Studies: I have personally reviewed pertinent reports  and I have personally reviewed pertinent films in PACS    EKG, Pathology, and Other Studies: I have personally reviewed pertinent reports        VTE Pharmacologic Prophylaxis: Enoxaparin (Lovenox)    VTE Mechanical Prophylaxis: sequential compression device

## 2018-08-21 PROBLEM — K59.01 SLOW TRANSIT CONSTIPATION: Status: RESOLVED | Noted: 2018-08-20 | Resolved: 2018-08-21

## 2018-08-21 LAB
ANION GAP SERPL CALCULATED.3IONS-SCNC: 3 MMOL/L (ref 4–13)
BUN SERPL-MCNC: 7 MG/DL (ref 5–25)
CALCIUM SERPL-MCNC: 8.2 MG/DL (ref 8.3–10.1)
CHLORIDE SERPL-SCNC: 101 MMOL/L (ref 100–108)
CO2 SERPL-SCNC: 28 MMOL/L (ref 21–32)
CREAT SERPL-MCNC: 0.64 MG/DL (ref 0.6–1.3)
GFR SERPL CREATININE-BSD FRML MDRD: 106 ML/MIN/1.73SQ M
GLUCOSE SERPL-MCNC: 91 MG/DL (ref 65–140)
POTASSIUM SERPL-SCNC: 3.9 MMOL/L (ref 3.5–5.3)
SODIUM SERPL-SCNC: 132 MMOL/L (ref 136–145)

## 2018-08-21 PROCEDURE — 99024 POSTOP FOLLOW-UP VISIT: CPT | Performed by: PHYSICIAN ASSISTANT

## 2018-08-21 PROCEDURE — 80048 BASIC METABOLIC PNL TOTAL CA: CPT | Performed by: NURSE PRACTITIONER

## 2018-08-21 PROCEDURE — 99232 SBSQ HOSP IP/OBS MODERATE 35: CPT | Performed by: NURSE PRACTITIONER

## 2018-08-21 RX ADMIN — ENOXAPARIN SODIUM 40 MG: 40 INJECTION SUBCUTANEOUS at 09:27

## 2018-08-21 RX ADMIN — ACETAMINOPHEN 975 MG: 325 TABLET ORAL at 13:13

## 2018-08-21 RX ADMIN — Medication 325 MG: at 09:25

## 2018-08-21 RX ADMIN — LOSARTAN POTASSIUM 100 MG: 50 TABLET ORAL at 09:25

## 2018-08-21 RX ADMIN — SOTALOL HYDROCHLORIDE 120 MG: 120 TABLET ORAL at 17:58

## 2018-08-21 RX ADMIN — OXYCODONE HYDROCHLORIDE 5 MG: 5 TABLET ORAL at 18:00

## 2018-08-21 RX ADMIN — ACETAMINOPHEN 975 MG: 325 TABLET ORAL at 06:08

## 2018-08-21 RX ADMIN — PANTOPRAZOLE SODIUM 40 MG: 40 TABLET, DELAYED RELEASE ORAL at 06:08

## 2018-08-21 RX ADMIN — SOTALOL HYDROCHLORIDE 120 MG: 120 TABLET ORAL at 09:25

## 2018-08-21 RX ADMIN — ACETAMINOPHEN 975 MG: 325 TABLET ORAL at 22:18

## 2018-08-21 NOTE — ASSESSMENT & PLAN NOTE
· hgb stable at 10 4  · Iron level 36; ferritin 236; iron sat 14; tibc 260  · C/w ferrous sulfate QD   · Iron studies after discharge

## 2018-08-21 NOTE — PROGRESS NOTES
Orthotic Note            Date: 8/21/2018      Patient Name: Erin Fails   15 mins         Reason for Consult:  Patient Active Problem List   Diagnosis    Myelomalacia [facility])    Spinal cord compression ([address] )    Other spondylosis with myelopathy, cervical region    Hyponatremia    Acute blood loss anemia    Benign essential HTN    Slow transit constipation   [address]  Per Andrey Dural, PA-C    Per neurosurgery, orthotech delivered and educated patient on the donning and doffing of [address]  Patient demonstrated good understanding of instructions by asking appropriate questions and voicing concerns  Patient without further questions or concerns at this time  Hunter Cullen will continue to follow up as needed  Recommendations:  Please call  in regards to bracing instructions and/or adjustments    Beny MEDELLIN, Restorative Technician, [employer], [place]

## 2018-08-21 NOTE — ASSESSMENT & PLAN NOTE
· POD #4 Posterior cervical decompression/laminectomy C3-5, superior 6, fixation fusion C2- T1  · NSX managing  · Pain control   · c-collar at all times   · PT- recommending IP rehab

## 2018-08-21 NOTE — ASSESSMENT & PLAN NOTE
· Chronic   · D/w PCP- patients BL NA level is 130-131 and has had an extensive w/u outpatient in the past with no acute findings   · NA stable today at 132  · Urine ; urine osmo 443; serum osmo 273  · C/w 1800ml fluid restriction   · Check  bmp in one week at rehab when discharged

## 2018-08-21 NOTE — PROGRESS NOTES
Progress Note - Frieda Ramirez 1955, 66 y o  male MRN: [id number]    Unit/Bed#: [facility] 908-01 Encounter: [id number]    Primary Care Provider: Phiilppe Madrid MD   Date and time admitted to hospital: 8/16/2018  5:16 AM        * Spinal cord compression (HCC)   Assessment & Plan    · POD #4 Posterior cervical decompression/laminectomy C3-5, superior 6, fixation fusion C2- T1  · NSX managing  · Pain control   · c-collar at all times   · PT- recommending IP rehab        Hyponatremia   Assessment & Plan    · Chronic   · D/w PCP- patients BL NA level is 130-131 and has had an extensive w/u outpatient in the past with no acute findings   · NA stable today at 132  · Urine ; urine osmo 443; serum osmo 273  · C/w 1800ml fluid restriction   · Check  bmp in one week at rehab when discharged         Acute blood loss anemia   Assessment & Plan    · hgb stable at 10 4  · Iron level 36; ferritin 236; iron sat 14; tibc 260  · C/w ferrous sulfate QD   · Iron studies after discharge         Benign essential HTN   Assessment & Plan    · Stable on sotalol and cozaar         Slow transit constipation-resolved as of 8/21/2018   Assessment & Plan    · Resolved on bowel regimen             VTE Pharmacologic Prophylaxis:   Pharmacologic: Enoxaparin (Lovenox)  Mechanical VTE Prophylaxis in Place: No    Discussions with Specialists or Other Care Team Provider: d/w NSX PA     Education and Discussions with Family / Patient: d/w patient     Time Spent for Care: 30 minutes  More than 50% of total time spent on counseling and coordination of care as described above  Current Length of Stay: 5 day(s)    Current Patient Status: Inpatient     Discharge Plan: at this time internal medicine will sign off  If you have any questions or concerns please call  Recommend checking a BMP in one week after discharge to continue to monitor sodium levels  Baseline sodium levels outpatient per -131   C/w bowel regimen for constipation and continue with blood pressure medications  Iron added, please f/u in 3 months with repeat iron studies and monitor cbc outpatient in one week  Code Status: Level 1 - Full Code      Subjective:   Pt reports 7/10 pain in his neck, reports this is a soreness  Denies any other complaints  Objective:     Vitals:   Temp (24hrs), Av 9 °F (36 6 °C), Min:97 9 °F (36 6 °C), Max:98 °F (36 7 °C)    HR:  [56-65] 56  Resp:  [18] 18  BP: (132-168)/(77-82) 132/80  SpO2:  [98 %-99 %] 98 %  Body mass index is 20 12 kg/m²  Input and Output Summary (last 24 hours): Intake/Output Summary (Last 24 hours) at 18 1341  Last data filed at 18 1313   Gross per 24 hour   Intake             1040 ml   Output                1 ml   Net             1039 ml       Physical Exam:     Physical Exam   Constitutional: He is oriented to person, place, and time  No distress  Neck:   c-collar in place    Cardiovascular: Normal rate, regular rhythm, normal heart sounds and intact distal pulses  No murmur heard  Pulmonary/Chest: Effort normal and breath sounds normal  No respiratory distress  He has no wheezes  He has no rales  Abdominal: Soft  Bowel sounds are normal  He exhibits no distension  There is no tenderness  There is no rebound  Musculoskeletal: He exhibits no edema or tenderness  Neurological: He is alert and oriented to person, place, and time  No cranial nerve deficit  Skin: Skin is warm and dry  No rash noted  He is not diaphoretic  No erythema  Psychiatric: He has a normal mood and affect         Additional Data:     Labs:      Results from last 7 days  Lab Units 18  0448   WBC Thousand/uL 5 32   HEMOGLOBIN g/dL 10 4*   HEMATOCRIT % 30 9*   PLATELETS Thousands/uL 164       Results from last 7 days  Lab Units 18  0439   SODIUM mmol/L 132*   POTASSIUM mmol/L 3 9   CHLORIDE mmol/L 101   CO2 mmol/L 28   BUN mg/dL 7   CREATININE mg/dL 0 64   CALCIUM mg/dL 8 2*   GLUCOSE RANDOM mg/dL 91 Results from last 7 days  Lab Units 08/16/18  1242   POC GLUCOSE mg/dl 149*             * I Have Reviewed All Lab Data Listed Above  * Additional Pertinent Lab Tests Reviewed: All Labs Within Last 24 Hours Reviewed        Recent Cultures (last 7 days):           Last 24 Hours Medication List:     Current Facility-Administered Medications:  acetaminophen 650 mg Oral Q4H PRN Arleen Pitt MD   acetaminophen 975 mg Oral [facility] Arleen Pitt MD   enoxaparin 40 mg Subcutaneous Daily Arleen Pitt MD   ferrous sulfate 325 mg Oral Daily With Breakfast TESFAYE Peraza   losartan 100 mg Oral Daily Arleen Pitt MD   methocarbamol 750 mg Oral 4x Daily PRN Arleen Pitt MD   naloxone 0 04 mg Intravenous Q1MIN PRN Arleen Pitt MD   ondansetron 4 mg Intravenous Q6H PRN Arleen Pitt MD   oxyCODONE 10 mg Oral Q4H PRN Arleen Pitt MD   oxyCODONE 5 mg Oral Q4H PRN Arleen Pitt MD   pantoprazole 40 mg Oral Early Morning Arleen Pitt MD   polyethylene glycol 17 g [facility]   senna-docusate sodium 2 tablet Oral Daily Arleen Pitt MD   sotalol 120 mg Oral BID Arleen Pitt MD        Today, Patient Was Seen By: TESFAYE Taylor    ** Please Note: Dictation voice to text software may have been used in the creation of this document   **

## 2018-08-21 NOTE — PROGRESS NOTES
Progress Note - Neurosurgery   Emmy Arriaga 66 y o  male MRN: [id number]  Unit/Bed#: [facility] 908-01 Encounter: [id number]        Assessment:  1  POD #4  Posterior cervical decompression/laminectomy C3-5, superior 6, fixation fusion C2- T1  2  Myelomalacia  3  History of cervical spinal cord compression  4  History of cervical spondylosis with myelipathy  5  Hyponatremia -- outpatient record indicated history of chronic hyponatremia  6  Leukocytosis   7  Acute blood loss anemia        Plan:     · EXAM: A&OX3, EOMI,  conjugate, finger-to-nose normal without pronator drift bilaterally  Motor strength 5/5, improved in both hands  Sensation to light touch is normal throughout, paresthesia stable in the both hands/fingers  DTR 3+ throughout, without clonus and Liana Mech is negative bilaterally  Incison clean and dry, without drain or discharge  · Imagings:  ? C-spine Xay (8/17/18) Posterior fusion C2 through T1   Alignment is anatomic   Multilevel laminectomy present   Moderate degenerative changes present at C6-C7   ? X-ray of cervical spine done on 08/19/2018 demonstrates Stable postoperative appearance of cervical spine  · C-collar at all times    (Margie for shower -- ordered  Otherwise Vista at all times)  · Pain control  ? APAP 975 q 8  ? Oxycodone 5mg q 4 hrs prn moderate pain or 10mg q 4 hrs prn severe pain  ? Methocarbamol 750 mg p o  Q 6H p r n  for muscle spasm  Methocabramol 750mg 1 tab QID prn muscle spasm     · Appreciate SLIM consult for medical management / chronic hyponatremia  § On fluid restriction  § Osmolality (urine) 443  ; urine sodium 117;   § Sodium 132  § HEMOGLOBIN 10 4  § Iron saturation %= 36-on ferrous sulphate  § Ferritin= 236  § On senna / docusate combo  Miralax added for consitpation  § Continue current antihypertensive tx     · DVT PPX:  CONTINUE ON  § SCD's for lower extremities    § on Lovenox 40 sq daily  · Patient is doing fine, incisional pain is improving, stable residual UE paresthesia in the hands and fingers, doing PT  Incision clean and dry, without drain or discharge  IV Dilaudid discontinued, continue PO pain meds as prescribed  PT recommend IP rehab, CM following insurance approval  Will discharge him as soon as we hear approval from insurance, and medically stable  Subjective/Objective   Chief Complaint: "My posterior neck pain is improving, 5/10" postop progress note    Subjective:  Patient does some improvement with his incisional pain, he claims 5/10, localized on the back and shoulder blade, has residual paresthesia and some numbness in both hands denies any shooting down extremity pain  Denies fever, chills, rigors, cough or chest pain  Denies discharge or drainage from the incision site  Appetite is good denies any nausea or vomiting  Denies bowel or bladder issues  Patient wears  cervical spine collar all the time  Doing PT and get out of bed and walking around using walker, denies any gait instability or tendency to  Fall  Objective:  Patient is sitting propped up in bed, no pain distress    I/O       08/19 0701 - 08/20 0700 08/20 0701 - 08/21 0700 08/21 0701 - 08/22 0700    P  O  970 820     Total Intake(mL/kg) 970 (15 7) 820 (13 3)     Urine (mL/kg/hr) 500 (0 3) 0 (0) 1 (0)    Drains 80 35     Total Output 580 35 1    Net +390 +785 -1           Unmeasured Urine Occurrence 2 x 6 x     Unmeasured Stool Occurrence 1 x 1 x           Invasive Devices     Peripheral Intravenous Line            Peripheral IV 08/20/18 Left Forearm less than 1 day                Physical Exam:  Vitals: Blood pressure 132/80, pulse 56, temperature 97 9 °F (36 6 °C), temperature source Oral, resp  rate 18, height 5' 9" (1 753 m), weight 61 8 kg (136 lb 3 9 oz), SpO2 98 %  ,Body mass index is 20 12 kg/m²          General appearance: alert, appears stated age, cooperative and no distress  Head: Normocephalic, without obvious abnormality, atraumatic  Eyes: EOMI, conjugate  Neck:  Incision clean and dry without drainage or discharge, [place] cervical collar is on  Lungs: non labored breathing  Heart: regular heart rate  Neurologic:   Mental status: Alert, oriented x3, thought content appropriate  Cranial nerves: grossly intact (Cranial nerves II-XII)  Sensory: normal to light touch throughout  Motor: moving all extremities without focal weakness, strength is 5/5 throughout,  strength is 2/4 bilateral  Reflexes: 3+ and symmetric without clonus and Babinski is negative bilaterally  Coordination: finger to nose normal bilaterally, no drift bilaterally      Lab Results:    Results from last 7 days  Lab Units 08/20/18  0448 08/18/18  1121 08/17/18  0442   WBC Thousand/uL 5 32 7 43 10 50*   HEMOGLOBIN g/dL 10 4* 10 8* 11 1*   HEMATOCRIT % 30 9* 32 1* 32 5*   PLATELETS Thousands/uL 164 167 160       Results from last 7 days  Lab Units 08/21/18  0439 08/20/18  0448 08/18/18  1121   SODIUM mmol/L 132* 133* 132*   POTASSIUM mmol/L 3 9 3 6 3 9   CHLORIDE mmol/L 101 100 99*   CO2 mmol/L 28 27 28   BUN mg/dL 7 6 8   CREATININE mg/dL 0 64 0 57* 0 78   CALCIUM mg/dL 8 2* 8 3 8 4   GLUCOSE RANDOM mg/dL 91 93 94                 No results found for: TROPONINT  ABG:No results found for: PHART, WPB9SEL, PO2ART, IGZ0SHG, L1YFLLRL, BEART, SOURCE    Imaging Studies: I have personally reviewed pertinent reports  and I have personally reviewed pertinent films in PACS    EKG, Pathology, and Other Studies: I have personally reviewed pertinent reports        VTE Pharmacologic Prophylaxis: Enoxaparin (Lovenox)    VTE Mechanical Prophylaxis: sequential compression device

## 2018-08-22 PROCEDURE — 97535 SELF CARE MNGMENT TRAINING: CPT | Performed by: STUDENT IN AN ORGANIZED HEALTH CARE EDUCATION/TRAINING PROGRAM

## 2018-08-22 PROCEDURE — 99024 POSTOP FOLLOW-UP VISIT: CPT | Performed by: PHYSICIAN ASSISTANT

## 2018-08-22 RX ADMIN — ENOXAPARIN SODIUM 40 MG: 40 INJECTION SUBCUTANEOUS at 10:04

## 2018-08-22 RX ADMIN — SENNOSIDES AND DOCUSATE SODIUM 2 TABLET: 8.6; 5 TABLET ORAL at 10:03

## 2018-08-22 RX ADMIN — LOSARTAN POTASSIUM 100 MG: 50 TABLET ORAL at 10:03

## 2018-08-22 RX ADMIN — Medication 325 MG: at 10:03

## 2018-08-22 RX ADMIN — ACETAMINOPHEN 975 MG: 325 TABLET ORAL at 06:08

## 2018-08-22 RX ADMIN — SOTALOL HYDROCHLORIDE 120 MG: 120 TABLET ORAL at 18:07

## 2018-08-22 RX ADMIN — ACETAMINOPHEN 975 MG: 325 TABLET ORAL at 14:44

## 2018-08-22 RX ADMIN — SOTALOL HYDROCHLORIDE 120 MG: 120 TABLET ORAL at 10:04

## 2018-08-22 RX ADMIN — PANTOPRAZOLE SODIUM 40 MG: 40 TABLET, DELAYED RELEASE ORAL at 06:08

## 2018-08-22 RX ADMIN — ACETAMINOPHEN 975 MG: 325 TABLET ORAL at 22:15

## 2018-08-22 NOTE — CASE MANAGEMENT
Continued Stay Review    Date: 08/22/2018    Vital Signs: /60 (BP Location: Left arm)   Pulse 58   Temp 97 9 °F (36 6 °C) (Oral)   Resp 18   Ht 5' 9" (1 753 m)   Wt 61 kg (134 lb 7 7 oz)   SpO2 100%   BMI 19 86 kg/m²     Medications:   Scheduled Meds:   Current Facility-Administered Medications:  acetaminophen 975 mg Oral Q8H KVNG   enoxaparin 40 mg Subcutaneous Daily   ferrous sulfate 325 mg Oral Daily With Breakfast   losartan 100 mg Oral Daily   methocarbamol 750 mg Oral 4x Daily PRN   naloxone 0 04 mg Intravenous Q1MIN PRN   pantoprazole 40 mg Oral Early Morning   senna-docusate sodium 2 tablet Oral Daily   sotalol 120 mg Oral BID     Continuous Infusions:    PRN Meds:   acetaminophen    methocarbamol    naloxone    ondansetron    oxyCODONE    oxyCODONE    polyethylene glycol    Abnormal Labs/Diagnostic Results: Na 132, H/H 10 4/30 9    Age/Sex: 66 y o  male     Assessment/Plan: Medical    * Spinal cord compression (HCC)   Assessment & Plan     · POD #4 Posterior cervical decompression/laminectomy C3-5, superior 6, fixation fusion C2- T1  · NSX managing  · Pain control   · c-collar at all times   · PT- recommending IP rehab          Hyponatremia   Assessment & Plan     · Chronic   · D/w PCP- patients BL NA level is 130-131 and has had an extensive w/u outpatient in the past with no acute findings   · NA stable today at 132  · Urine ; urine osmo 443; serum osmo 273  · C/w 1800ml fluid restriction   · Check  bmp in one week at rehab when discharged           Acute blood loss anemia   Assessment & Plan     · hgb stable at 10 4  · Iron level 36; ferritin 236; iron sat 14; tibc 260  § C/w ferrous sulfate QD   § Iron studies after discharge           Benign essential HTN   Assessment & Plan     · Stable on sotalol and cozaar           Slow transit constipation-resolved as of 8/21/2018   Assessment & Plan     · Resolved on bowel regimen                 VTE Pharmacologic Prophylaxis: Pharmacologic: Enoxaparin (Lovenox)  Mechanical VTE Prophylaxis in Place: No      Current Length of Stay: 5 day(s)     Current Patient Status: Inpatient      Discharge Plan: at this time internal medicine will sign off  If you have any questions or concerns please call  Recommend checking a BMP in one week after discharge to continue to monitor sodium levels  Baseline sodium levels outpatient per -131  C/w bowel regimen for constipation and continue with blood pressure medications  Iron added, please f/u in 3 months with repeat iron studies and monitor cbc outpatient in one week       Assessment:  Neurosurgery  1  POD #4  Posterior cervical decompression/laminectomy C3-5, superior 6, fixation fusion C2- T1  2  Myelomalacia  3  History of cervical spinal cord compression  4  History of cervical spondylosis with myelipathy  5  Hyponatremia -- outpatient record indicated history of chronic hyponatremia  6  Leukocytosis   7  Acute blood loss anemia        Plan:     · EXAM: A&OX3, EOMI,  conjugate, finger-to-nose normal without pronator drift bilaterally   Motor strength 5/5, improved in both hands  Sensation to light touch is normal throughout, paresthesia stable in the both hands/fingers  DTR 3+ throughout, without clonus and [place] is negative bilaterally  Incison clean and dry, without drain or discharge  · Imagings:  ? C-spine Xay (8/17/18) Posterior fusion C2 through T1   Alignment is anatomic   Multilevel laminectomy present   Moderate degenerative changes present at C6-C7   ? X-ray of cervical spine done on 08/19/2018 demonstrates Stable postoperative appearance of cervical spine  · C-collar at all times    (Margie for shower -- ordered  Otherwise [place] at all times)  · Pain control  ? APAP 975 q 8  ? Oxycodone 5mg q 4 hrs prn moderate pain or 10mg q 4 hrs prn severe pain  ? Methocarbamol 750 mg p o   Q 6H p r n  for muscle spasm  Methocabramol 750mg 1 tab QID prn muscle spasm     · Appreciate SLIM consult for medical management / chronic hyponatremia  § On fluid restriction  § Osmolality (urine) 443  ; urine sodium 117;   § Sodium 132  § HEMOGLOBIN 10 4  § Iron saturation %= 36-on ferrous sulphate  § Ferritin= 236  § On senna / docusate combo  Miralax added for consitpation  § Continue current antihypertensive tx     · DVT PPX:  CONTINUE ON  § SCD's for lower extremities  § on Lovenox 40 sq daily  · Patient is doing fine, incisional pain is improving, stable residual UE paresthesia in the hands and fingers, doing PT  Incision clean and dry, without drain or discharge  IV Dilaudid discontinued, continue PO pain meds as prescribed  PT recommend IP rehab, CM following insurance approval  Will discharge him as soon as we hear approval from insurance, and medically stable

## 2018-08-22 NOTE — PLAN OF CARE
Problem: OCCUPATIONAL THERAPY ADULT  Goal: Performs self-care activities at highest level of function for planned discharge setting  See evaluation for individualized goals  Treatment Interventions: ADL retraining, Functional transfer training, UE strengthening/ROM, Endurance training, Cognitive reorientation, Patient/family training, Equipment evaluation/education, Compensatory technique education, Activityengagement, Energy conservation, Fine motor coordination activities  Equipment Recommended:  (use of SC and RW )       See flowsheet documentation for full assessment, interventions and recommendations  Outcome: Progressing  Limitation: Decreased ADL status, Decreased UE ROM, Decreased UE strength, Decreased Safe judgement during ADL, Decreased endurance, Decreased self-care trans, Decreased high-level ADLs  Prognosis: Good  Assessment: Pt participates in OT session with focus on bathing, dressing, activity tolerance, and transfers to increase I for d/c  Pt supervision supine to sit EOB  Pt engaged in c-collar management activity to switch out shower collar with regular c-collar  Pt completed 3 trials switching and still requires min A for last trial 2* decreased [facility] with velcro straps  Pt setup UB bathing to wash with SB while standing at the sink with RW support  Pt elects to complete rest of SB later with arrival of lunch  Pt setup LB dressing to don/doff socks while seated at EOB  Pt will continue to benefit from activity tolerance and adls       OT Discharge Recommendation: Short Term Rehab (vs home with family support)  OT - OK to Discharge: Yes (when medically cleared )

## 2018-08-22 NOTE — OCCUPATIONAL THERAPY NOTE
08/22/18 1210   Restrictions/Precautions   Weight Bearing Precautions Per Order No   Braces or Orthoses C/S Collar   Other Precautions Spinal precautions; Fall Risk;Pain   Pain Assessment   Pain Assessment 0-10   Pain Score 5   ADL   Where Assessed Standing at sink   Grooming Assistance 5  Supervision/Setup   Grooming Deficit Setup   Grooming Comments wash face    UB Bathing Assistance 5  Supervision/Setup   UB Bathing Deficit Setup   UB Bathing Comments in stance   UB Dressing Assistance 4  Minimal Assistance   UB Dressing Deficit Setup;Pull around back   LB Dressing Assistance 5  Supervision/Setup   LB Dressing Deficit Setup   LB Dressing Comments don/doff socks   Functional Standing Tolerance   Time 10 min   Activity static standing at sink   Bed Mobility   Supine to Sit 5  Supervision   Additional items HOB elevated   Sit to Supine 5  Supervision   Additional items HOB elevated   Transfers   Sit to Stand 5  Supervision   Stand to Sit 5  Supervision   Stand pivot 5  Supervision   Cognition   Overall Cognitive Status WFL   Arousal/Participation Alert   Attention Within functional limits   Orientation Level Oriented X4   Memory Within functional limits   Following Commands Follows multistep commands with increased time or repetition   Activity Tolerance   Activity Tolerance Patient tolerated treatment well   Assessment   Assessment Pt participates in OT session with focus on bathing, dressing, activity tolerance, and transfers to increase I for d/c  Pt supervision supine to sit EOB  Pt engaged in c-collar management activity to switch out shower collar with regular c-collar  Pt completed 3 trials switching and still requires min A for last trial 2* decreased [facility] with velcro straps  Pt setup UB bathing to wash with SB while standing at the sink with RW support  Pt elects to complete rest of SB later with arrival of lunch  Pt setup LB dressing to don/doff socks while seated at EOB   Pt will continue to benefit from activity tolerance and adls  Plan   Treatment Interventions ADL retraining;Functional transfer training; Endurance training; Activityengagement   Goal Expiration Date 08/27/18   Treatment Day 3   OT Frequency 3-5x/wk   Recommendation   OT Discharge Recommendation Short Term Rehab  (vs home with family support)

## 2018-08-22 NOTE — PROGRESS NOTES
Progress Note - Neurosurgery   Benita Garvey 66 y o  male MRN: [id number]  Unit/Bed#: [facility] 908-01 Encounter: [id number]        Assessment:  1  POD #4  Posterior cervical decompression/laminectomy C3-5, superior 6, fixation fusion C2- T1  2  Myelomalacia  3  History of cervical spinal cord compression  4  History of cervical spondylosis with myelipathy  5  Hyponatremia -- outpatient record indicated history of chronic hyponatremia  6  Leukocytosis   7  Acute blood loss anemia        Plan:     · EXAM: GCS 15,  CN 2-12 grossly intact, finger-to-nose normal without pronator drift bilaterally   Motor strength 5/5, improved in both hands   Sensation to light touch is normal throughout, paresthesia stable in the both hands/fingers  DTR 3+ throughout, without clonus and [place] is negative bilaterally  Some gait issues, doing PT and using walker  Incison clean and dry, without drain or discharge  · Imagings:  ? C-spine Xay (8/17/18) Posterior fusion C2 through T1   Alignment is anatomic   Multilevel laminectomy present   Moderate degenerative changes present at C6-C7   ? X-ray of cervical spine done on 08/19/2018 demonstrates Stable postoperative appearance of cervical spine  · C-collar at all times    (Margie for shower -- ordered  Otherwise [place] at all times)  · Pain control  ? APAP 975 q 8  ? Oxycodone 5mg q 4 hrs prn moderate pain or 10mg q 4 hrs prn severe pain  ? Methocarbamol 750 mg p o  Q 6H p r n  for muscle spasm  Methocabramol 750mg 1 tab QID prn muscle spasm     · Appreciate SLIM consult for medical management / chronic hyponatremia  § On fluid restriction  § Osmolality (urine) 443  ; urine sodium 117;   § Sodium 132  § HEMOGLOBIN 10 4  § Iron saturation %= 36-on ferrous sulphate  § Ferritin= 236  § On senna / docusate combo  Miralax added for consitpation  § BP WNL-continue current Rx    · DVT PPX:  CONTINUE ON  § SCD's for lower extremities    § on Lovenox 40 sq daily  · Patient says his incisional and shoulder blades pain is stable at 5/10, and tolerable with oral pain meds  Has residual paresthesia in the hands/fingers that would probably takes long time to improve, had improved  and  LE strength, walking around using walker accompanied by PT  Patient awaiting insurance approval for discharge to rehab  Subjective/Objective   Chief Complaint: " My neck pain and hand tinglings stable"/post op progress note    Subjective: Patient reports that his incisional and shoulder blades pain is stable at 5/10, no change with his hands /fingers paresthesias, improved  strength, denies any shooting down extremity pain  Denies fever, chills, rigors, discharge or drainage from the incision site  Appetite is good and denies any nausea or vomiting  Denies bowel or bladder issues  Doing PT using walker    Objective:  Patient is sitting  on the edge of the bed, wearing [place] cervical collar is not in pain distress    I/O       08/20 0701 - 08/21 0700 08/21 0701 - 08/22 0700 08/22 0701 - 08/23 0700    P  O  820 1120 240    Total Intake(mL/kg) 820 (13 3) 1120 (18 4) 240 (3 9)    Urine (mL/kg/hr) 0 (0) 1 (0)     Drains 35      Total Output 35 1      Net +785 +1119 +240           Unmeasured Urine Occurrence 6 x 4 x 1 x    Unmeasured Stool Occurrence 1 x 0 x           Invasive Devices     Peripheral Intravenous Line            Peripheral IV 08/20/18 Left Forearm 1 day                Physical Exam:  Vitals: Blood pressure 124/60, pulse 58, temperature 97 9 °F (36 6 °C), temperature source Oral, resp  rate 18, height 5' 9" (1 753 m), weight 61 kg (134 lb 7 7 oz), SpO2 100 %  ,Body mass index is 19 86 kg/m²          General appearance: alert, appears stated age, cooperative and no distress  Head: Normocephalic, without obvious abnormality, atraumatic  Eyes: EOMI, conjugate  Neck:  Dressing clean and dry, without drainage or discharge  Lungs: non labored breathing  Heart: regular heart rate  Neurologic:   Mental status: Alert, oriented x3, thought content appropriate  Cranial nerves: grossly intact (Cranial nerves II-XII)  Sensory: normal to light touch throughout  Motor: moving all extremities without focal weakness, strength is 5/5 and  strengths 2/4 bilateral   Reflexes:  Slightly exaggerated  symmetrically  Coordination: finger to nose normal bilaterally, no drift bilaterally      Lab Results:    Results from last 7 days  Lab Units 08/20/18  0448 08/18/18  1121 08/17/18  0442   WBC Thousand/uL 5 32 7 43 10 50*   HEMOGLOBIN g/dL 10 4* 10 8* 11 1*   HEMATOCRIT % 30 9* 32 1* 32 5*   PLATELETS Thousands/uL 164 167 160       Results from last 7 days  Lab Units 08/21/18  0439 08/20/18  0448 08/18/18  1121   SODIUM mmol/L 132* 133* 132*   POTASSIUM mmol/L 3 9 3 6 3 9   CHLORIDE mmol/L 101 100 99*   CO2 mmol/L 28 27 28   BUN mg/dL 7 6 8   CREATININE mg/dL 0 64 0 57* 0 78   CALCIUM mg/dL 8 2* 8 3 8 4   GLUCOSE RANDOM mg/dL 91 93 94                 No results found for: TROPONINT  ABG:No results found for: PHART, [id number], PO2ART, SHW9PTG, [id number], BEART, SOURCE    Imaging Studies: I have personally reviewed pertinent reports  and I have personally reviewed pertinent films in PACS    EKG, Pathology, and Other Studies: I have personally reviewed pertinent reports        VTE Pharmacologic Prophylaxis: Enoxaparin (Lovenox)    VTE Mechanical Prophylaxis: sequential compression device

## 2018-08-23 VITALS
BODY MASS INDEX: 19.92 KG/M2 | WEIGHT: 134.48 LBS | HEIGHT: 69 IN | OXYGEN SATURATION: 98 % | TEMPERATURE: 98.2 F | DIASTOLIC BLOOD PRESSURE: 68 MMHG | HEART RATE: 64 BPM | RESPIRATION RATE: 18 BRPM | SYSTOLIC BLOOD PRESSURE: 124 MMHG

## 2018-08-23 PROBLEM — K59.00 CONSTIPATION: Status: ACTIVE | Noted: 2018-08-23

## 2018-08-23 PROCEDURE — 99024 POSTOP FOLLOW-UP VISIT: CPT | Performed by: NEUROLOGICAL SURGERY

## 2018-08-23 RX ORDER — OXYCODONE HYDROCHLORIDE 10 MG/1
10 TABLET ORAL EVERY 4 HOURS PRN
Qty: 30 TABLET | Refills: 0 | Status: SHIPPED | OUTPATIENT
Start: 2018-08-23 | End: 2018-09-02

## 2018-08-23 RX ORDER — AMOXICILLIN 250 MG
2 CAPSULE ORAL DAILY
Qty: 20 TABLET | Refills: 0 | Status: SHIPPED | OUTPATIENT
Start: 2018-08-24 | End: 2019-08-23 | Stop reason: ALTCHOICE

## 2018-08-23 RX ORDER — ACETAMINOPHEN 325 MG/1
TABLET ORAL
Qty: 30 TABLET | Refills: 0 | Status: SHIPPED | OUTPATIENT
Start: 2018-08-23 | End: 2021-01-15

## 2018-08-23 RX ORDER — METHOCARBAMOL 750 MG/1
750 TABLET, FILM COATED ORAL 4 TIMES DAILY PRN
Qty: 10 TABLET | Refills: 0 | Status: SHIPPED | OUTPATIENT
Start: 2018-08-23 | End: 2019-08-23 | Stop reason: ALTCHOICE

## 2018-08-23 RX ORDER — FERROUS SULFATE 325(65) MG
325 TABLET ORAL
Qty: 30 TABLET | Refills: 0 | Status: SHIPPED | OUTPATIENT
Start: 2018-08-24 | End: 2021-01-15

## 2018-08-23 RX ORDER — OXYCODONE HYDROCHLORIDE 5 MG/1
5 TABLET ORAL EVERY 4 HOURS PRN
Qty: 20 TABLET | Refills: 0 | Status: SHIPPED | OUTPATIENT
Start: 2018-08-23 | End: 2018-09-02

## 2018-08-23 RX ADMIN — Medication 325 MG: at 08:38

## 2018-08-23 RX ADMIN — ENOXAPARIN SODIUM 40 MG: 40 INJECTION SUBCUTANEOUS at 08:45

## 2018-08-23 RX ADMIN — SENNOSIDES AND DOCUSATE SODIUM 2 TABLET: 8.6; 5 TABLET ORAL at 08:38

## 2018-08-23 RX ADMIN — POLYETHYLENE GLYCOL 3350 17 G: 17 POWDER, FOR SOLUTION ORAL at 07:00

## 2018-08-23 RX ADMIN — PANTOPRAZOLE SODIUM 40 MG: 40 TABLET, DELAYED RELEASE ORAL at 05:51

## 2018-08-23 RX ADMIN — LOSARTAN POTASSIUM 100 MG: 50 TABLET ORAL at 08:43

## 2018-08-23 RX ADMIN — ACETAMINOPHEN 975 MG: 325 TABLET ORAL at 05:51

## 2018-08-23 NOTE — DISCHARGE INSTRUCTIONS
 Please keep incision clean and dry  Avoid applying creams, lotion or antiseptic to incision area   Allow steri-strips to fall off  Please remove them completely in 10 days   Check the wound daily  If the incision becomes red, swollen, tender, warm, or has increased drainage please notify MD immediately   May shower 3 days after surgery, but do not soak in a tub and no swimming   No heavy lifting greater than 5 - 10lbs   May walk as tolerated   Please wear collar as if directed by MD   Wear C-Collar for 6 weeks or until cleared by Neurosurgery   May cover incision with dry sterile bandage daily while using collar   Please take pain medications to relieved incision pain, and muscle relaxants to prevent spasms as directed by MD or Extender  See Med  Rec  completed at Discharge   Please take over the counter stool softeners such as colace or senna-s to avoid constipation while on narcotics   No driving for 2 weeks or while on narcotics   Returning to work will be discussed at follow up appt   Do not take ibuprofen, Naproxen/Aleve or any NSAID: May take Tylenol instead   If taking Coumadin, Aspirin, or Plavix, you may resume these medications once cleared by Neurosurgery   Follow-up as scheduled for a 2 week incision check and staple/suture removal   Follow-up 6 weeks after surgery with repeat cervical spine upright x-rays to be completed prior to visit  **Please notify MD immediately if you experience a fever of 101  F or have increased neck or arm pain  New numbness and/or weakness in your arm  Difficulty swallowing or breathing especially while lying down  Numbness or weakness in arms or legs   **

## 2018-08-23 NOTE — SOCIAL WORK
Cm received phone call from [employer] liaison explaining that insurance authorization was received for patient to transfer to rehab  Auth # is [id number]  Cm arranged a WC van [facility] for 12:30pm today after confirming that patient is in agreement with OOP cost   After Cm arranged WC van transport, Cm went to tell patient but patient informed Cm that his sister would be able to transport him to rehab   Cm informed medical team

## 2018-08-23 NOTE — PROGRESS NOTES
Progress Note - Neurosurgery   Emmy Arriaga 66 y o  male MRN: [id number]  Unit/Bed#: [facility] 908-01 Encounter: [id number]        Assessment:  1  POD #7  Posterior cervical decompression/laminectomy C3-5, superior 6, fixation fusion C2- T1  2  Myelomalacia  3  History of cervical spinal cord compression  4  History of cervical spondylosis with myelipathy  5  Hyponatremia -- outpatient record indicated history of chronic hyponatremia  6  Leukocytosis   7  Acute blood loss anemia        Plan:     · EXAM:  · Imagings: A&OX3, EOMI, Finger to nose test normal bilaterally and without pronator drift  Motor strength 5/5 throughout, sensations to LT normal, some residual numbness in the fingers/hands  DTR 3+ throughout  No clonus and [place] is negative bilat  ? C-spine Xay (8/17/18) Posterior fusion C2 through T1   Alignment is anatomic   Multilevel laminectomy present   Moderate degenerative changes present at C6-C7   ? X-ray of cervical spine done on 08/19/2018 demonstrates Stable postoperative appearance of cervical spine  · C-collar at all times    (Margie for shower -- ordered  Otherwise [place] at all times)  · Pain control  ? APAP 975 q 8  ? Oxycodone 5mg q 4 hrs prn moderate pain or 10mg q 4 hrs prn severe pain  ? Methocarbamol 750 mg p o  Q 6H p r n  for muscle spasm  Methocabramol 750mg 1 tab QID prn muscle spasm     · Appreciate SLIM consult for medical management / chronic hyponatremia  § On fluid restriction  § Osmolality (urine) 443  ; urine sodium 117;   § Sodium 132  § HEMOGLOBIN 10 4  § Iron saturation %= 36-on ferrous sulphate  § Ferritin= 236  § On senna / docusate combo  Miralax added for consitpation  § BP WNL-continue current Rx     · DVT PPX:  CONTINUE ON  § SCD's for lower extremities  § on Lovenox 40 sq daily  · Patient is doing, his incisional and shoulder blade pain is improving with oral p o  Medication, neurological is stable other than residual paresthesia and numbness in the fingers and hands  Doing PT well, uses walker  Incision is healing well without drain or discharge  Patient is discharged to SNF for rehabilitation improved  Had script for p o  pain medications  Wearing [place] cervical collar  Two weeks wound check follow-up confirmed  Six weeks  upright cervical spine x-rays follow-up placed  Subjective/Objective   Chief Complaint: "  No change"/postop progress note    Subjective:  Patient states his pain is stable, 5/10, the back of the neck and shoulder blade  Denies any shooting down upper extremity pain  Denies fever, chills, rigors, chest pain or cough  The weakness in the arms, except occasional residual paresthesia and the fingers and hands  Appetite is good,  denies history of nausea or vomiting  He is doing PT, wearing [place] cervical collar     Objective:  Patient is supine in bed, propped up pain distress  Wearing [place] cervical    I/O       08/21 0701 - 08/22 0700 08/22 0701 - 08/23 0700 08/23 0701 - 08/24 0700    P  O  1120 600     Total Intake(mL/kg) 1120 (18 4) 600 (9 8)     Urine (mL/kg/hr) 1 (0)      Total Output 1        Net +1119 +600             Unmeasured Urine Occurrence 4 x 4 x     Unmeasured Stool Occurrence 0 x 1 x           Invasive Devices     Peripheral Intravenous Line            Peripheral IV 08/20/18 Left Forearm 2 days                Physical Exam:  Vitals: Blood pressure 124/68, pulse 64, temperature 98 2 °F (36 8 °C), temperature source Oral, resp  rate 18, height 5' 9" (1 753 m), weight 61 kg (134 lb 7 7 oz), SpO2 98 %  ,Body mass index is 19 86 kg/m²        General appearance: alert, appears stated age, cooperative and no distress  Head: Normocephalic, without obvious abnormality, atraumatic  Eyes: EOMI, conjugate  Neck:  Incision clean and dry,wearing [place] cervical  Lungs: non labored breathing  Heart: regular heart rate  Neurologic:   Mental status: Alert, oriented x3, thought content appropriate  Cranial nerves: grossly intact (Cranial nerves II-XII)  Sensory: normal to light touch throughout, except slightly decreased sensation on the fingertips of both hands  Motor: moving all extremities without focal weakness , strength  5/5,  2/4 bilateral  Reflexes: 3+ and symmetric, without clonus and Bab[place] is negative bilaterally  Coordination: finger to nose normal bilaterally, no drift bilaterally      Lab Results:    Results from last 7 days  Lab Units 08/20/18  0448 08/18/18  1121 08/17/18  0442   WBC Thousand/uL 5 32 7 43 10 50*   HEMOGLOBIN g/dL 10 4* 10 8* 11 1*   HEMATOCRIT % 30 9* 32 1* 32 5*   PLATELETS Thousands/uL 164 167 160       Results from last 7 days  Lab Units 08/21/18  0439 08/20/18  0448 08/18/18  1121   SODIUM mmol/L 132* 133* 132*   POTASSIUM mmol/L 3 9 3 6 3 9   CHLORIDE mmol/L 101 100 99*   CO2 mmol/L 28 27 28   BUN mg/dL 7 6 8   CREATININE mg/dL 0 64 0 57* 0 78   CALCIUM mg/dL 8 2* 8 3 8 4   GLUCOSE RANDOM mg/dL 91 93 94                 No results found for: TROPONINT  ABG:No results found for: PHART, [id number], PO2ART, [id number], [id number], BEART, SOURCE    Imaging Studies: I have personally reviewed pertinent reports  and I have personally reviewed pertinent films in PACS    EKG, Pathology, and Other Studies: I have personally reviewed pertinent reports        VTE Pharmacologic Prophylaxis: Enoxaparin (Lovenox)    VTE Mechanical Prophylaxis: sequential compression device

## 2018-08-23 NOTE — DISCHARGE SUMMARY
Discharge Summary - NeuroSurgery   Melchor Johnson 66 y o  male MRN: [id number]  [facility] Encounter: [id number]    Admission Date: 08/16/2018  Admission Orders     Ordered        08/16/18 1740  Inpatient Admission  Once                Discharge Date: 08/23/2018    Admitting Diagnosis: Myelomalacia [facility]) [G95 89]  Spinal cord compression ([facility] [address] ) [G95 20]  Other spondylosis with myelopathy, cervical region [M47 12]    Discharge Diagnosis: same to preop Dx    Resolved Problems  Date Reviewed: 8/21/2018          Resolved    Slow transit constipation 8/21/2018     Resolved by  TESFAYE Gracia          Attending: Dr Ella Santo Physician(s): None    Procedures Performed: Posterior cervical decompression/laminectomy C3-5, superior 6, fixation fusion C2- T1    Pathology: None    Hospital Course: Patient is 66 yrs old man, admitted with diagnosis of SC compression,cervical spondylosis with myelopathy myelomalacia after he presented with progressive bilateral hand and fingers numbness and paresthesia, difficulty abducting his arms, and using his hands  Patient has also difficulty walking with gait problems, requiring cane, issues with voiding  On exam, the patient has bilateral Salazar's, biceps reflexes are essentially normal, right patellar reflex is 3+, left 2+, plus-minus clonus on the right   Hand strength, interosseous 3/5, deltoids 4 to 4+/5 to 90 degree point, otherwise biceps triceps wrist extensors  all 4+ at least/5   unsteady on his feet, relies heavily on the cane, with considerable falls risk  MRI of the neck demonstrates myelomalacia at C3-4, with profound stenosis primarily from a posterior vector   He also is a herniated disc left side C5-6, but he has no profound radicular complaint left side as opposed to myelopathy   There is circumferential compression at the C5-6 disc space  On 08/16/2018 a  Posterior cervical decompression/laminectomy C3-5, superior 6, fixation fusion C2- T1 was done  MARGARITA drains were placed  After the procedure, patient was monitored in PACU  Following recovery from anaesthesia, he was transferred to pacu for one night then to floor  In the floor patient's pain was treated with different oral and IV pain meds  In the mean time his serum Na drops and he was treated with fluid restriction and table salts per nephrologist  ( Patient had Hx of chronic hyponatremia)  Subsequently  his incisional pain improved, strength in the legs better, doing PT walking around with walker, wearing [place] vista cx collar  PT cleared patient for rehab and patient's discharge was delayed by two days because of insurance authorization process  Patient complains of  residual  numbness and paresthesia in the hands and fingers, which is expected to take longer periods to recover  On 08/23/2018, patient discharged to SNF improved  Condition at Discharge: good     Discharge instructions/Information to patient and family:   See after visit summary for information provided to patient and family  Provisions for Follow-Up Care:  See after visit summary for information related to follow-up care and any pertinent home health orders  Disposition: Skilled nursing facility at [place] [address] Readmission:no    Discharge Statement   I spent 20 minutes discharging the patient  This time was spent on the day of discharge  I had direct contact with the patient on the day of discharge  Additional documentation is required if more than 30 minutes were spent on discharge  Discharge Medications:  See after visit summary for reconciled discharge medications provided to patient and family

## 2018-08-23 NOTE — PROGRESS NOTES
Progress Note - Neurosurgery   Julia Berman 66 y o  male MRN: [id number]  Unit/Bed#: DISCHARGE POOL Encounter: [id number]    Assessment/Plan:    · POD # 7 from [facility] for spinal cord compression, myelomalacia, myelopathy,   · Neurologically stable  · Hyponatremia improved c/w preop and stable  · Started on po Fe  · Dispo today to Rehab      History:    Chief Complaint: "My hands are still numb "     Subjective: "My hands are still numb " Denies new W/N/T  Shoulder stiffness rather than pain  current meds:   Current Facility-Administered Medications   Medication Dose Route Frequency    acetaminophen (TYLENOL) tablet 650 mg  650 mg Oral Q4H PRN    acetaminophen (TYLENOL) tablet 975 mg  975 mg Oral Q8H [facility]    enoxaparin (LOVENOX) subcutaneous injection 40 mg  40 mg Subcutaneous Daily    ferrous sulfate tablet 325 mg  325 mg Oral Daily With Breakfast    losartan (COZAAR) tablet 100 mg  100 mg Oral Daily    methocarbamol (ROBAXIN) tablet 750 mg  750 mg Oral 4x Daily PRN    naloxone (NARCAN) 0 04 mg/mL syringe 0 04 mg  0 04 mg Intravenous Q1MIN PRN    ondansetron (ZOFRAN) injection 4 mg  4 mg Intravenous Q6H PRN    oxyCODONE (ROXICODONE) immediate release tablet 10 mg  10 mg Oral Q4H PRN    oxyCODONE (ROXICODONE) IR tablet 5 mg  5 mg Oral Q4H PRN    pantoprazole (PROTONIX) EC tablet 40 mg  40 mg Oral Early Morning    polyethylene glycol (MIRALAX) packet 17 g  17 g Oral Daily PRN    senna-docusate sodium (SENOKOT S) 8 6-50 mg per tablet 2 tablet  2 tablet Oral Daily    sotalol (BETAPACE) tablet 120 mg  120 mg Oral BID       Objective:     Exam:     Vitals: Blood pressure 124/68, pulse 64, temperature 98 2 °F (36 8 °C), temperature source Oral, resp  rate 18, height 5' 9" (1 753 m), weight 61 kg (134 lb 7 7 oz), SpO2 98 %  ,Body mass index is 19 86 kg/m²  Physical Exam     Collar well fitted    Neurologic Exam     PACHECO well     CDI      MDM:    Lab Results:      Results from last 7 days  Lab Units 08/20/18  0448 08/18/18  1121 08/17/18  0442   WBC Thousand/uL 5 32 7 43 10 50*   HEMOGLOBIN g/dL 10 4* 10 8* 11 1*   HEMATOCRIT % 30 9* 32 1* 32 5*   PLATELETS Thousands/uL 164 167 160       Results from last 7 days  Lab Units 08/21/18  0439 08/20/18  0448 08/18/18  1121   SODIUM mmol/L 132* 133* 132*   POTASSIUM mmol/L 3 9 3 6 3 9   CHLORIDE mmol/L 101 100 99*   CO2 mmol/L 28 27 28   BUN mg/dL 7 6 8   CREATININE mg/dL 0 64 0 57* 0 78   CALCIUM mg/dL 8 2* 8 3 8 4   GLUCOSE RANDOM mg/dL 91 93 94

## 2018-08-24 ENCOUNTER — TELEPHONE (OUTPATIENT)
Dept: NEUROSURGERY | Facility: CLINIC | Age: 63
End: 2018-08-24

## 2018-08-24 NOTE — TELEPHONE ENCOUNTER
Jah 74 again  Spoke with the patient's nurse, Enid Turcios  She reports the patient is doing well overall  She reports he is ambulating with a cane around the rehab facility  [place] vista c-collar is in place at all times, cielo collar is used for showering  Patient is showering daily  She reports the patient denied any pain today and has not required any of his oxycodone  She denies any incisional issues  She reports the incision is open to air  Reviewed incision care with the nurse  Instructed the nurse to make sure the patient showers daily and to use a mild soap to cleanse the incision with gentle pressure  Instructed the nurse to not use any ointments, creams, or lotions on the incision  The nurse is aware to call the office if any redness, swelling, drainage, dehiscence of incision, or fever >100 F occurs  Reminded the nurse about the importance of preventing blood clots and provided measures how to prevent them  The nurse is aware to call the office if any concerns or questions may arise  Provided my direct phone line  Reminded the nurse of the patients upcoming appointment with the date/time/location  She is aware the xrays are to be completed before the 6 week POV  The nurse expressed understanding of the instructions we discussed

## 2018-08-24 NOTE — TELEPHONE ENCOUNTER
Patient was discharged from the hospital on 8/23/18 to [address] [facility] to see how the patient is doing postoperatively  The phone rang for 5 minutes without an answer or option to leave a voice message  Will attempt later today again

## 2018-08-29 ENCOUNTER — OFFICE VISIT (OUTPATIENT)
Dept: NEUROSURGERY | Facility: CLINIC | Age: 63
End: 2018-08-29

## 2018-08-29 VITALS
WEIGHT: 124 LBS | RESPIRATION RATE: 16 BRPM | SYSTOLIC BLOOD PRESSURE: 124 MMHG | BODY MASS INDEX: 18.37 KG/M2 | HEIGHT: 69 IN | DIASTOLIC BLOOD PRESSURE: 72 MMHG | TEMPERATURE: 98.6 F | HEART RATE: 76 BPM

## 2018-08-29 DIAGNOSIS — Z48.89 AFTERCARE FOLLOWING SURGERY FOR INJURY AND TRAUMA: Primary | ICD-10-CM

## 2018-08-29 PROCEDURE — 99024 POSTOP FOLLOW-UP VISIT: CPT | Performed by: PHYSICIAN ASSISTANT

## 2018-08-29 NOTE — PROGRESS NOTES
Assessment/Plan:    Very pleasant 66 -year-old male, returns for two-week postoperative follow-up, status post:  Posterior cervical decompression/laminectomy C3-5, superior 6, fixation fusion C2- T1 (N/A Spine Cervical), performed by Dr Carin Thompson, 8/16/18       He reports overall he is very pleased with surgical outcome, numbness and tingling of his hands persist weakness in his upper extremities particularly on the right is improved weakness in his lower extremity on the right remains about the same  He feels not as confident as he used to with ambulation  He does report with certain motions he has some neck pain and points to the right occipital area particular  He denies pain over his wound    Wound care was reviewed with the patient, specifically he is to observe for redness, swelling, increased pain, drainage or fever, should these be observed he understands he is too call and/or return immediately for reassessment  Additionally the patient understands he may shower, wash with soap and water, pat wound dry, he also understands he is to avoid immersion in water such as swimming, hot tub use or tub bath, and may resume immersion in water in approximately 2 weeks  Activity levels were also reviewed with the patient in detail, he is to lift no greater than 10 pounds, he is advised he may occasionally bend, ambulation is encouraged as tolerated  He understands to continue to wear his Vista type cervical collar at all times until follow-up with Dr Carin Thompson in approximately 4 weeks  He does have an Aspen collar which he understands to use for bath time  In addition high discussed washing his collar pads every day or 2, with soap and water, allowed to air dry  He also understands he has a follow-up in approximately 4 weeks with Dr Carin Thompson  He also understands should there be any significant change in his neurologic status; he is to call and/or return immediately for reassessment      He also understands he is to have a plain x-ray of the cervical spine 2 or 3 days prior to his follow-up visit with Dr Marinus Osler, this is ordered  These findings, impressions and recommendations are reviewed in great detail with the patient, he expressed understanding and agreement, his questions were answered completely and to his satisfaction  Follow up has been scheduled  Diagnoses and all orders for this visit:    Aftercare following surgery for injury and trauma          Return in about 4 weeks (around 9/26/2018) for With plain films of the cervical spine a few days prior to visit  Subjective:      Patient ID: Eloina Hawkins is a 66 y o  male  HPI    The following portions of the patient's history were reviewed and updated as appropriate: allergies, current medications, past family history, past medical history, past social history and past surgical history  Review of Systems   Constitutional: Negative  HENT: Negative  Eyes: Negative  Respiratory: Negative  Cardiovascular: Negative  Gastrointestinal: Negative  Endocrine: Negative  Genitourinary: Negative  Musculoskeletal: Positive for neck pain and neck stiffness  Skin: Negative  Allergic/Immunologic: Negative  Hematological: Negative  Psychiatric/Behavioral: Negative  Objective:    Physical Exam   Constitutional: He is oriented to person, place, and time  He appears well-developed and well-nourished  HENT:   Head: Normocephalic and atraumatic  Neck:   Vista type cervical collar in place at time of examination  Cardiovascular: Normal rate, regular rhythm and normal heart sounds  Pulmonary/Chest: Effort normal and breath sounds normal    Neurological: He is alert and oriented to person, place, and time  He has normal reflexes  Skin: Skin is warm and dry  Surgical site; Posterior neck midline, approximately 12 cm in length      Approximately Twenty-five surgical clips are in place and they were removed without complication  A single nylon suture was also in place this was also removed  The wound is healing very nicely there is a trace erythema consistent with residual clips the wound is otherwise healed, nontender, clean and dry  No surrounding erythema  No evidence of wound infection  Psychiatric: He has a normal mood and affect  Neurologic Exam     Mental Status   Oriented to person, place, and time  Level of consciousness: alert    Motor Exam   Muscle bulk: normal  Overall muscle tone: normal    Strength   Right strength:  There is some weakness on the right when compared with the left of the upper extremities  Right deltoid: 5/5  Left deltoid: 5/5  Right biceps: 5/5  Left biceps: 5/5    Gait, Coordination, and Reflexes     Gait  Gait: shuffling (Ambulates with the assistance of a 4 point walker )

## 2018-08-29 NOTE — PATIENT INSTRUCTIONS
Continue with cervical collar at all times until follow-up with Dr Laure Jones in approximately 4 weeks  Continued use Aspen collar for shower time  Continue with weight restriction, specifically lifting no greater than 10 lb, ambulation as tolerated is encouraged, avoid bending  Observe wound for any changes, redness, swelling, pain, drainage  Further follow-up is planned in approximately 4 weeks with Dr Laure Jones, plain films of the cervical spine 2 or 3 days prior to visit are requested

## 2018-09-04 ENCOUNTER — TELEPHONE (OUTPATIENT)
Dept: NEUROSURGERY | Facility: CLINIC | Age: 63
End: 2018-09-04

## 2018-09-04 NOTE — TELEPHONE ENCOUNTER
Jen from [facility] reports this pt is being d/c from the facility today  Facility physician questions if he may resume taking  for Afib  Pt is s/p C2-T1 fusion 8/16  Please advise    Thank You

## 2018-09-05 NOTE — TELEPHONE ENCOUNTER
From my review of the chart, do not see any contraindications to restarting the aspirin  Will notify patient to restart the aspirin

## 2018-09-05 NOTE — TELEPHONE ENCOUNTER
Called the patient to tell him it is okay for him to restart the aspirin  Patient said he will restart tomorrow  Patient reports stomach cramps since leaving the rehab  Directed patient to his PCP regarding this  Patient also reports a small rash below his incision  He reports it is not on his incision  He denies any incisional drainage, redness, swelling, or dehiscence  He reports the rash has been present for a few days now  It is not spreading or worsening or improving  He reports he believes it is from the pressure of the collar  Do you think we should bring him in as a nurse visit to assess incision?

## 2018-09-18 ENCOUNTER — TELEPHONE (OUTPATIENT)
Dept: NEUROSURGERY | Facility: CLINIC | Age: 63
End: 2018-09-18

## 2018-09-18 NOTE — TELEPHONE ENCOUNTER
Can send him for an MRI of CSpine with and without contrast to be done prior to his 6 week POV     Thanks

## 2018-09-18 NOTE — TELEPHONE ENCOUNTER
FYI    Pt reports he continues to experience the same symptoms as prior to surgery, those being bilateral numbness and tingling from elbows to hands and fingers  He feels his symptoms may have actually worsened    He has a f/u 9/28

## 2018-09-19 DIAGNOSIS — Z98.890 POST-OPERATIVE STATE: Primary | ICD-10-CM

## 2018-09-19 NOTE — TELEPHONE ENCOUNTER
Jermaine Mcclure from MRI approved 9/25/18 at noon for the patient to receive the MRI  Notified central scheduling of this  Notified patient of this  He confirmed the appointment  He is aware to also receive the x-rays as well

## 2018-09-19 NOTE — TELEPHONE ENCOUNTER
MRI ordered  Called patient to notify him of the MRI to be completed prior to the appointment  Informed patient the office will call him back with an appointment date/time/location for the MRI  Patient prefers the [facility]  Called central scheduling to schedule MRI prior to the patient's 6 week POV on 9/28/18  Central scheduling said they have to place out a message to the department to see if we can schedule him sooner than October 8th October 8th was the soonest appointment they could offer  They tried reaching out to the department to see if they could accomodate, however nobody answered  She said she will call once she has an answer  Placed appointment for October 8th as a place mauricio

## 2018-09-25 ENCOUNTER — HOSPITAL ENCOUNTER (OUTPATIENT)
Dept: RADIOLOGY | Facility: HOSPITAL | Age: 63
Discharge: HOME | End: 2018-09-25
Payer: COMMERCIAL

## 2018-09-25 ENCOUNTER — HOSPITAL ENCOUNTER (OUTPATIENT)
Dept: MRI IMAGING | Facility: HOSPITAL | Age: 63
Discharge: HOME | End: 2018-09-25
Attending: NEUROLOGICAL SURGERY
Payer: COMMERCIAL

## 2018-09-25 DIAGNOSIS — Z98.890 POST-OPERATIVE STATE: ICD-10-CM

## 2018-09-25 DIAGNOSIS — G95.20 SPINAL CORD COMPRESSION (HCC): ICD-10-CM

## 2018-09-25 PROCEDURE — 72156 MRI NECK SPINE W/O & W/DYE: CPT

## 2018-09-25 PROCEDURE — A9585 GADOBUTROL INJECTION: HCPCS | Performed by: NEUROLOGICAL SURGERY

## 2018-09-25 PROCEDURE — 72040 X-RAY EXAM NECK SPINE 2-3 VW: CPT

## 2018-09-25 RX ADMIN — GADOBUTROL 5 ML: 604.72 INJECTION INTRAVENOUS at 12:41

## 2018-09-28 ENCOUNTER — TELEPHONE (OUTPATIENT)
Dept: NEUROSURGERY | Facility: CLINIC | Age: 63
End: 2018-09-28

## 2018-09-28 ENCOUNTER — OFFICE VISIT (OUTPATIENT)
Dept: NEUROSURGERY | Facility: CLINIC | Age: 63
End: 2018-09-28

## 2018-09-28 VITALS
HEIGHT: 69 IN | BODY MASS INDEX: 18.78 KG/M2 | HEART RATE: 54 BPM | DIASTOLIC BLOOD PRESSURE: 86 MMHG | RESPIRATION RATE: 16 BRPM | TEMPERATURE: 98.4 F | SYSTOLIC BLOOD PRESSURE: 162 MMHG | WEIGHT: 126.8 LBS

## 2018-09-28 DIAGNOSIS — G95.89 MYELOMALACIA (HCC): Primary | ICD-10-CM

## 2018-09-28 DIAGNOSIS — M47.12 OTHER SPONDYLOSIS WITH MYELOPATHY, CERVICAL REGION: ICD-10-CM

## 2018-09-28 PROBLEM — G95.20 SPINAL CORD COMPRESSION (HCC): Status: RESOLVED | Noted: 2018-08-01 | Resolved: 2018-09-28

## 2018-09-28 PROCEDURE — 99024 POSTOP FOLLOW-UP VISIT: CPT | Performed by: NEUROLOGICAL SURGERY

## 2018-09-28 NOTE — PROGRESS NOTES
Neurosurgery Office Note  Zee Madrid 66 y o  male MRN: [id number]      Assessment/Plan      Diagnoses and all orders for this visit:    Myelomalacia ([address] )  -     XR spine cervical complete 4 or 5 vw non injury; Future    Other spondylosis with myelopathy, cervical region  -     XR spine cervical complete 4 or 5 vw non injury; Future          Discussion:    Six weeks status post PCDF for myelomalacia  Preop mJOA score was 9/17, with considerable difficulty in the hands, weakness in his arms, difficulty with self-care, difficulty with walking, bladder difficulty etc     Leading up to 6 week postop visit, he felt the numbness in his his hands had not gotten much better, implied they were worse when discussed by phone  He does not mention radicular pain down his arms  He was sent for an MRI scan of the cervical spine as well as x-rays done typically at the 6 week postop sonny  MRI shows excellent decompression of spinal cord  There is some very slight canal stenosis at the inferior edge of the decompression, but no cord compromise/compression etc , at that location  Myelomalacia present at C3-4 is evident, but he has excellent cord decompression  Hardware all appears stable, good lordosis etc     His incision is well healed  He has some mild heat rash posterior neck, we discussed skin care for that  I reviewed with the patient the expected evolution after this type of surgery  In fact, his mJOA score has improved dramatically, now 13/17, and is EQ5D3L preop was to 2232 and now his EQ5D5L is 12178  Objectively, he is clearly improved, although he is not subjectively as satisfied  I offered him referral to physical therapy  He declined  I offered him return to the office in 6 weeks to review his progress etc   He is more accepting of phone follow-up at that point to capture QOD metrics  We discussed when he from the collar    I have instructed the patient to wean from their cervical collar, starting in 3 weeks  At that time, they should do this by not wearing the collar 1-2 hours per day for 1 week week, then 2-4 hours per day the week following, then 4-8 hours per day the subsequent week, such that within 6 weeks they will wean out of the collar entirely  They do not need to wear the collar while sleeping starting in 3 weeks  They should continue to wear the collar when doing strenuous activity such as therapy etc         CHIEF COMPLAINT    Chief Complaint   Patient presents with    Post-op     6 week POV with new MRI c-spine and X-Rays       HISTORY    History of Present Illness     66y o  year old male     HPI    See Discussion    REVIEW OF SYSTEMS    Review of Systems   HENT: Negative  Eyes: Negative  Respiratory: Negative  Cardiovascular: Negative  Gastrointestinal: Positive for abdominal pain and constipation  Endocrine: Negative  Genitourinary: Negative  Nocturia 1-2x   Musculoskeletal: Positive for arthralgias (arms), back pain (LBP, unchanged), gait problem (walking improved, using cane for stability only, not needed around the house) and neck stiffness  Skin: Positive for rash (heat rash on back and shoulders) and wound (surgical incision)  Allergic/Immunologic: Negative  Neurological: Positive for weakness (arms) and numbness (B/L hands)  B/L hands and fingers difficulty, difficulty with fine finger movements, can't feel hands   Hematological: Bruises/bleeds easily  Psychiatric/Behavioral: Positive for sleep disturbance (2-3 hrs at a time)           Meds/Allergies     Current Outpatient Prescriptions   Medication Sig Dispense Refill    acetaminophen (TYLENOL) 325 mg tablet Acetaminophen 650 mg p o  q4H MILD pain (Patient taking differently: as needed Acetaminophen 650 mg p o  q4H MILD pain ) 30 tablet 0    allopurinol (ZYLOPRIM) 100 mg tablet Take 100 mg by mouth daily      ferrous sulfate 325 (65 Fe) mg tablet Take 1 tablet (325 mg total) by mouth daily with breakfast (Patient taking differently: Take 325 mg by mouth every other day  ) 30 tablet 0    losartan (COZAAR) 100 MG tablet Take 100 mg by mouth daily      omeprazole (PriLOSEC) 20 mg delayed release capsule Take 20 mg by mouth as needed      sotalol (BETAPACE) 120 mg tablet Take 120 mg by mouth 2 (two) times a day      methocarbamol (ROBAXIN) 750 mg tablet Take 750 mg by mouth 3 (three) times a day as needed  0    methocarbamol (ROBAXIN) 750 mg tablet Take 1 tablet (750 mg total) by mouth 4 (four) times a day as needed for muscle spasms (Patient not taking: Reported on 9/28/2018 ) 10 tablet 0    senna-docusate sodium (SENOKOT S) 8 6-50 mg per tablet Take 2 tablets by mouth daily (Patient not taking: Reported on 9/28/2018 ) 20 tablet 0     No current facility-administered medications for this visit          No Known Allergies    PAST HISTORY    Past Medical History:   Diagnosis Date    A-fib ([address] ) 01/01/2005    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 03/10/2017    Anemia 12/31/2015    Anxiety     Back ache 08/31/2017    Diarrhea 08/31/2017    Diverticula of colon 01/01/2012    Family history of prostate cancer     Father    GERD (gastroesophageal reflux disease)     GERD with esophagitis 08/31/2017    Gout 08/31/2017    Hordeolum externum of lower eyelid 08/31/2017    Hyperlipidemia 05/01/2017    Hypertension, essential 05/01/2017    Hyponatremia 07/10/2014    Loss of taste 05/01/2017    Muscle strain 08/31/2017    Osteoarthritis     Paresthesia 06/08/2018    Raised prostate specific antigen 05/01/2017    Skin lesion 03/08/2018    Spinal stenosis of lumbar region 02/06/2017    Steatosis of liver 08/31/2017    Weight loss, unintentional 08/31/2017       Past Surgical History:   Procedure Laterality Date    BACK SURGERY      CHEST TUBE INSERTION  1980    Insertion and removal / collapsed lung    COLONOSCOPY      OTHER SURGICAL HISTORY  03/10/2017    Abscess removed from L5 & S1    NJ ARTHRODESIS POSTERIOR/POSTERIORLATERAL CERVICAL BELOW C2 N/A 8/16/2018    Procedure: Posterior cervical decompression/laminectomy C3-5, superior 6, fixation fusion C2- T1;  Surgeon: Jennifer Almendarez MD;  Location: [facility];  Service: Neurosurgery       Social History   Substance Use Topics    Smoking status: Former Smoker     Types: Cigarettes     Quit date: 1980    Smokeless tobacco: Never Used    Alcohol use 3 0 oz/week     5 Cans of beer per week      Comment: Beer       Family History   Problem Relation Age of Onset    Prostate cancer Father     Kidney disease Father     Pancreatic cancer Mother          Above history personally reviewed  EXAM    Vitals:Blood pressure 162/86, pulse (!) 54, temperature 98 4 °F (36 9 °C), temperature source Tympanic, resp  rate 16, height 5' 9" (1 753 m), weight 57 5 kg (126 lb 12 8 oz)  ,Body mass index is 18 73 kg/m²  Physical Exam    Neurologic Exam      MEDICAL DECISION MAKING    Imaging Studies:     Xr Spine Cervical 2 Or 3 Vw Injury    Result Date: 9/26/2018  Narrative:   CERVICAL SPINE INDICATION:   G95 20: Unspecified cord compression  COMPARISON:  X-ray 8/19/2018 VIEWS:  XR SPINE CERVICAL 2 OR 3 VW INJURY FINDINGS: Images obtained in a collar  Stable postoperative appearance  Patient status post fusion C3 2 through the T1 level  The lateral mass screws C2 C3 C4, C5, to the left at C6, bilaterally C7 and pedicle screws T1  Surgical drain and staples at been removed  Decompressive laminectomy C3-C6 inclusive  Diffuse spondylosis and osteoarthritis with marked mid cervical facet arthrosis  Loss of disc height, marginal osteophytes and loss of disc height most pronounced at C6-C7  Prevertebral soft tissues normal   Lung apices are clear  Impression: Stable postoperative appearance following C2-T1 posterior fusion   Workstation performed: [id number]     Mri Cervical Spine With And Without Contrast    Result Date: 9/26/2018  Narrative: MRI CERVICAL SPINE WITH AND WITHOUT CONTRAST INDICATION: Z98 890: Other specified postprocedural states  Postsurgical pain COMPARISON:  MR 7/11/2018, x-ray 9/25/2018 TECHNIQUE:  Sagittal T1, sagittal T2, sagittal inversion recovery, axial 2D merge and axial T2  Sagittal T1 and axial T1 postcontrast   IV Contrast:  5 mL of gadobutrol injection (MULTI-DOSE) IMAGE QUALITY:  Diagnostic  FINDINGS: ALIGNMENT:  Normal alignment of the cervical spine  No compression fracture  No subluxation  No scoliosis  Posterior fusion C2-T1  Bilateral lateral mass screws C2, C3, C4, C5, to the left at C6, bilateral C7 and T1  Minor straightening cervical lordosis with slight anterolisthesis C4-C5 loss of disc height striking at C6-C7 slight anterolisthesis of C3 and C4  Decompressive laminectomy C3 C6-7 MARROW SIGNAL:  Normal marrow signal is identified within the visualized bony structures  No discrete marrow lesion  CERVICAL AND VISUALIZED UPPER THORACIC CORD:  Although the spinal canal is adequately decompressed, myelomalacia present within the cord extending nearly a centimeter in cephalocaudad dimension centered, at the C3-C4 level, site of maximum depression on preoperative study  Mild malacic changes  Asymmetric to the left at this level  PREVERTEBRAL AND PARASPINAL SOFT TISSUES:  Normal  VISUALIZED POSTERIOR FOSSA:  The visualized posterior fossa demonstrates no abnormal signal  CERVICAL DISC SPACES: C2-C3:  Minor bulge C3-C4:  Mild bulge C4-C5:  Minor bulge C5-C6:  Mild left uncinate arthrosis  No significant stenosis  C6-C7:  Loss of disc height, circumferential bulge, marginal osteophytes, chronic reactive endplate changes  AP dimension of the canal reduced to 8 mm  No definite cord compression  There is also moderately severe left greater than right foraminal stenosis, correlate for left greater than right C7 radiculitis   C7-T1:  Normal  UPPER THORACIC DISC SPACES:  Chronic reactive endplate changes at G2-A5  Moderate facet arthrosis  These are stable in appearance  POSTCONTRAST IMAGING:  Normal      Impression: Patient status post the decompressive laminectomy C3-C6 and posterior fusion C2-T1  Canal is adequately decompressed  Myelomalacic changes largely to the left at the C3-C4 level related to long-standing: compression  Potentially significant left greater than right C6-C7 foraminal stenosis, correlate for C7 radiculitis  Workstation performed: [id number]       I have personally reviewed pertinent reports     and I have personally reviewed pertinent films in PACS

## 2018-09-28 NOTE — TELEPHONE ENCOUNTER
Electronic request from [facility] for refill of Ferrous sulfate prescribed at d/c  Message left for pharmacist informing we would not be managing this med, and message left for pt informing him this would need to managed further by his medical DR  If it is felt it is necessary  Encouraged a call back with questions

## 2018-09-28 NOTE — PROGRESS NOTES
Neurosurgery Office Note  Henry Harley 66 y o  male MRN: [id number]      Assessment/Plan      Diagnoses and all orders for this visit:    Myelomalacia ([address] )  -     XR spine cervical complete 4 or 5 vw non injury; Future    Other spondylosis with myelopathy, cervical region  -     XR spine cervical complete 4 or 5 vw non injury; Future          Discussion:    Six weeks status post PCDF for myelomalacia  Preop mJOA score was 9/17, with considerable difficulty in the hands, weakness in his arms, difficulty with self-care, difficulty with walking, bladder difficulty etc     Leading up to 6 week postop visit, he felt the numbness in his his hands had not gotten much better, implied they were worse when discussed by phone  He does not mention radicular pain down his arms  He was sent for an MRI scan of the cervical spine as well as x-rays done typically at the 6 week postop sonny  MRI shows excellent decompression of spinal cord  There is some very slight canal stenosis at the inferior edge of the decompression, but no cord compromise/compression etc , at that location  Myelomalacia present at C3-4 is evident, but he has excellent cord decompression  Hardware all appears stable, good lordosis etc     His incision is well healed  He has some mild heat rash posterior neck, we discussed skin care for that  I reviewed with the patient the expected evolution after this type of surgery  In fact, his mJOA score has improved dramatically, now 13/17, and is EQ5D3L preop was to 2232 and now his EQ5D5L is 25479  Objectively, he is clearly improved, although he is not subjectively as satisfied  I offered him referral to physical therapy  He declined  I offered him return to the office in 6 weeks to review his progress etc   He is more accepting of phone follow-up at that point to capture QOD metrics  We discussed when he from the collar    I have instructed the patient to wean from their cervical collar, starting in 3 weeks  At that time, they should do this by not wearing the collar 1-2 hours per day for 1 week week, then 2-4 hours per day the week following, then 4-8 hours per day the subsequent week, such that within 6 weeks they will wean out of the collar entirely  They do not need to wear the collar while sleeping starting in 3 weeks  They should continue to wear the collar when doing strenuous activity such as therapy etc         CHIEF COMPLAINT    Chief Complaint   Patient presents with    Post-op     6 week POV with new MRI c-spine and X-Rays       HISTORY    History of Present Illness     66y o  year old male     HPI    See Discussion    REVIEW OF SYSTEMS    Review of Systems   HENT: Negative  Eyes: Negative  Respiratory: Negative  Cardiovascular: Negative  Gastrointestinal: Positive for abdominal pain and constipation  Endocrine: Negative  Genitourinary: Negative  Nocturia 1-2x   Musculoskeletal: Positive for arthralgias (arms), back pain (LBP, unchanged), gait problem (walking improved, using cane for stability only, not needed around the house) and neck stiffness  Skin: Positive for rash (heat rash on back and shoulders) and wound (surgical incision)  Allergic/Immunologic: Negative  Neurological: Positive for weakness (arms) and numbness (B/L hands)  B/L hands and fingers difficulty, difficulty with fine finger movements, can't feel hands   Hematological: Bruises/bleeds easily  Psychiatric/Behavioral: Positive for sleep disturbance (2-3 hrs at a time)           Meds/Allergies     Current Outpatient Prescriptions   Medication Sig Dispense Refill    acetaminophen (TYLENOL) 325 mg tablet Acetaminophen 650 mg p o  q4H MILD pain (Patient taking differently: as needed Acetaminophen 650 mg p o  q4H MILD pain ) 30 tablet 0    allopurinol (ZYLOPRIM) 100 mg tablet Take 100 mg by mouth daily      ferrous sulfate 325 (65 Fe) mg tablet Take 1 tablet (325 mg total) by mouth daily with breakfast (Patient taking differently: Take 325 mg by mouth every other day  ) 30 tablet 0    losartan (COZAAR) 100 MG tablet Take 100 mg by mouth daily      omeprazole (PriLOSEC) 20 mg delayed release capsule Take 20 mg by mouth as needed      sotalol (BETAPACE) 120 mg tablet Take 120 mg by mouth 2 (two) times a day      methocarbamol (ROBAXIN) 750 mg tablet Take 750 mg by mouth 3 (three) times a day as needed  0    methocarbamol (ROBAXIN) 750 mg tablet Take 1 tablet (750 mg total) by mouth 4 (four) times a day as needed for muscle spasms (Patient not taking: Reported on 9/28/2018 ) 10 tablet 0    senna-docusate sodium (SENOKOT S) 8 6-50 mg per tablet Take 2 tablets by mouth daily (Patient not taking: Reported on 9/28/2018 ) 20 tablet 0     No current facility-administered medications for this visit          No Known Allergies    PAST HISTORY    Past Medical History:   Diagnosis Date    A-fib ([address] ) 01/01/2005    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 03/10/2017    Anemia 12/31/2015    Anxiety     Back ache 08/31/2017    Diarrhea 08/31/2017    Diverticula of colon 01/01/2012    Family history of prostate cancer     Father    GERD (gastroesophageal reflux disease)     GERD with esophagitis 08/31/2017    Gout 08/31/2017    Hordeolum externum of lower eyelid 08/31/2017    Hyperlipidemia 05/01/2017    Hypertension, essential 05/01/2017    Hyponatremia 07/10/2014    Loss of taste 05/01/2017    Muscle strain 08/31/2017    Osteoarthritis     Paresthesia 06/08/2018    Raised prostate specific antigen 05/01/2017    Skin lesion 03/08/2018    Spinal stenosis of lumbar region 02/06/2017    Steatosis of liver 08/31/2017    Weight loss, unintentional 08/31/2017       Past Surgical History:   Procedure Laterality Date    BACK SURGERY      CHEST TUBE INSERTION  1980    Insertion and removal / collapsed lung    COLONOSCOPY      OTHER SURGICAL HISTORY  03/10/2017    Abscess removed from L5 & S1    NJ ARTHRODESIS POSTERIOR/POSTERIORLATERAL CERVICAL BELOW C2 N/A 8/16/2018    Procedure: Posterior cervical decompression/laminectomy C3-5, superior 6, fixation fusion C2- T1;  Surgeon: Adela Salcedo MD;  Location: [facility];  Service: Neurosurgery       Social History   Substance Use Topics    Smoking status: Former Smoker     Types: Cigarettes     Quit date: 1980    Smokeless tobacco: Never Used    Alcohol use 3 0 oz/week     5 Cans of beer per week      Comment: Beer       Family History   Problem Relation Age of Onset    Prostate cancer Father     Kidney disease Father     Pancreatic cancer Mother          Above history personally reviewed  EXAM    Vitals:Blood pressure 162/86, pulse (!) 54, temperature 98 4 °F (36 9 °C), temperature source Tympanic, resp  rate 16, height 5' 9" (1 753 m), weight 57 5 kg (126 lb 12 8 oz)  ,Body mass index is 18 73 kg/m²  Physical Exam    Neurologic Exam      MEDICAL DECISION MAKING    Imaging Studies:     Xr Spine Cervical 2 Or 3 Vw Injury    Result Date: 9/26/2018  Narrative:   CERVICAL SPINE INDICATION:   G95 20: Unspecified cord compression  COMPARISON:  X-ray 8/19/2018 VIEWS:  XR SPINE CERVICAL 2 OR 3 VW INJURY FINDINGS: Images obtained in a collar  Stable postoperative appearance  Patient status post fusion C3 2 through the T1 level  The lateral mass screws C2 C3 C4, C5, to the left at C6, bilaterally C7 and pedicle screws T1  Surgical drain and staples at been removed  Decompressive laminectomy C3-C6 inclusive  Diffuse spondylosis and osteoarthritis with marked mid cervical facet arthrosis  Loss of disc height, marginal osteophytes and loss of disc height most pronounced at C6-C7  Prevertebral soft tissues normal   Lung apices are clear  Impression: Stable postoperative appearance following C2-T1 posterior fusion   Workstation performed: [id number]     Mri Cervical Spine With And Without Contrast    Result Date: 9/26/2018  Narrative: MRI CERVICAL SPINE WITH AND WITHOUT CONTRAST INDICATION: Z98 890: Other specified postprocedural states  Postsurgical pain COMPARISON:  MR 7/11/2018, x-ray 9/25/2018 TECHNIQUE:  Sagittal T1, sagittal T2, sagittal inversion recovery, axial 2D merge and axial T2  Sagittal T1 and axial T1 postcontrast   IV Contrast:  5 mL of gadobutrol injection (MULTI-DOSE) IMAGE QUALITY:  Diagnostic  FINDINGS: ALIGNMENT:  Normal alignment of the cervical spine  No compression fracture  No subluxation  No scoliosis  Posterior fusion C2-T1  Bilateral lateral mass screws C2, C3, C4, C5, to the left at C6, bilateral C7 and T1  Minor straightening cervical lordosis with slight anterolisthesis C4-C5 loss of disc height striking at C6-C7 slight anterolisthesis of C3 and C4  Decompressive laminectomy C3 C6-7 MARROW SIGNAL:  Normal marrow signal is identified within the visualized bony structures  No discrete marrow lesion  CERVICAL AND VISUALIZED UPPER THORACIC CORD:  Although the spinal canal is adequately decompressed, myelomalacia present within the cord extending nearly a centimeter in cephalocaudad dimension centered, at the C3-C4 level, site of maximum depression on preoperative study  Mild malacic changes  Asymmetric to the left at this level  PREVERTEBRAL AND PARASPINAL SOFT TISSUES:  Normal  VISUALIZED POSTERIOR FOSSA:  The visualized posterior fossa demonstrates no abnormal signal  CERVICAL DISC SPACES: C2-C3:  Minor bulge C3-C4:  Mild bulge C4-C5:  Minor bulge C5-C6:  Mild left uncinate arthrosis  No significant stenosis  C6-C7:  Loss of disc height, circumferential bulge, marginal osteophytes, chronic reactive endplate changes  AP dimension of the canal reduced to 8 mm  No definite cord compression  There is also moderately severe left greater than right foraminal stenosis, correlate for left greater than right C7 radiculitis   C7-T1:  Normal  UPPER THORACIC DISC SPACES:  Chronic reactive endplate changes at D6-R7  Moderate facet arthrosis  These are stable in appearance  POSTCONTRAST IMAGING:  Normal      Impression: Patient status post the decompressive laminectomy C3-C6 and posterior fusion C2-T1  Canal is adequately decompressed  Myelomalacic changes largely to the left at the C3-C4 level related to long-standing: compression  Potentially significant left greater than right C6-C7 foraminal stenosis, correlate for C7 radiculitis  Workstation performed: [id number]       I have personally reviewed pertinent reports     and I have personally reviewed pertinent films in PACS

## 2019-01-04 ENCOUNTER — TELEPHONE (OUTPATIENT)
Dept: NEUROSURGERY | Facility: CLINIC | Age: 64
End: 2019-01-04

## 2019-01-04 NOTE — TELEPHONE ENCOUNTER
Patient was called to R/S his appt on 2/15/19 because  [facility] was not available that day, Patient R/S for [email or web address]  Perlita Guevara

## 2019-01-30 ENCOUNTER — TELEPHONE (OUTPATIENT)
Dept: NEUROSURGERY | Facility: CLINIC | Age: 64
End: 2019-01-30

## 2019-01-30 NOTE — TELEPHONE ENCOUNTER
L/m notifying patient that [facility] will not be available for his appt  On 2/22/19  Elaine Ríos still available for him to keep the original appt  Patient instructed to call back with decision and to r/s for later date if he chooses to see [facility]

## 2019-02-18 ENCOUNTER — HOSPITAL ENCOUNTER (OUTPATIENT)
Dept: RADIOLOGY | Facility: HOSPITAL | Age: 64
Discharge: HOME | End: 2019-02-18
Attending: NEUROLOGICAL SURGERY
Payer: COMMERCIAL

## 2019-02-18 DIAGNOSIS — G95.89 MYELOMALACIA (HCC): ICD-10-CM

## 2019-02-18 DIAGNOSIS — M47.12 OTHER SPONDYLOSIS WITH MYELOPATHY, CERVICAL REGION: ICD-10-CM

## 2019-02-18 PROCEDURE — 72040 X-RAY EXAM NECK SPINE 2-3 VW: CPT

## 2019-02-22 ENCOUNTER — OFFICE VISIT (OUTPATIENT)
Dept: NEUROSURGERY | Facility: CLINIC | Age: 64
End: 2019-02-22
Payer: COMMERCIAL

## 2019-02-22 VITALS
SYSTOLIC BLOOD PRESSURE: 158 MMHG | BODY MASS INDEX: 19.4 KG/M2 | HEIGHT: 69 IN | HEART RATE: 58 BPM | TEMPERATURE: 98.7 F | WEIGHT: 131 LBS | RESPIRATION RATE: 16 BRPM | DIASTOLIC BLOOD PRESSURE: 90 MMHG

## 2019-02-22 DIAGNOSIS — G95.89 MYELOMALACIA (HCC): ICD-10-CM

## 2019-02-22 DIAGNOSIS — M47.12 OTHER SPONDYLOSIS WITH MYELOPATHY, CERVICAL REGION: ICD-10-CM

## 2019-02-22 DIAGNOSIS — M54.12 RADICULOPATHY, CERVICAL: ICD-10-CM

## 2019-02-22 DIAGNOSIS — M54.2 CERVICALGIA: ICD-10-CM

## 2019-02-22 DIAGNOSIS — Z48.89 AFTERCARE FOLLOWING SURGERY FOR INJURY AND TRAUMA: Primary | ICD-10-CM

## 2019-02-22 PROCEDURE — 99213 OFFICE O/P EST LOW 20 MIN: CPT | Performed by: PHYSICIAN ASSISTANT

## 2019-02-22 NOTE — PROGRESS NOTES
Assessment/Plan:    Very pleasant 53-year-old male, returns for six-month postoperative follow-up  Patient is status post "Posterior cervical decompression/laminectomy C3-5, superior 6, fixation fusion C2- T1 ", performed by Dr Jose Ramon Fermin, 8/16/18  He has had updated plain films of the cervical spine as requested 2/18/19  The studies were carefully reviewed in detail by Dr Jose Ramon Fermin, and compared with prior studies, 9/25/18, and 8/19/18, the internal fixator hardware is stable and remains in ideal position  Overall he reports he continues to improve slowly, still has complaint of numbness and tingling sensation particularly right shoulder, right forearm, and right hand greater than left, he notes his  strength in the right is decreased when compared to his left  Reports he has improved ability to manage feeding with a knife and fork, he continues to have significant difficulty managing buttons  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  He reports some improvement in his gait, he denies falling or almost falls, he does continue to use a single-point cane for gait balance stability  He reports bladder issues are essentially resolved  Plain films of the cervical spine 2/18/19 year carefully reviewed in detail by Dr Jose Ramon Fermin, and compared with prior studies of 9/25/18 overall the instrumentation remains ideally placed  On examination today motor examination of the upper extremities is essentially 5 x 5 for power,  strength is also 5 x 5 (there may be a trace of decrease in the right compared to the left), surgical site posterior neck is well-healed although is a prominence at the C7-T1 construct area  This could surgical scar is mature  Further follow-up with Neurosurgery is planned in approximately 6 months (1 year postop) with repeat plain films of the cervical spine  He reports he has added B complex and B6 to his vitamin regimen    I did discuss addition of medications such as Neurontin and/or pain management consultation relative to his radicular symptoms in his upper extremities, he felt as though that this is not significant enough at this time and did not feel the need for this  These findings, impressions and recommendations are reviewed in great detail with the patient, he expressed understanding and agreement, his questions were answered completely and to his satisfaction  Follow up has been scheduled  Diagnoses and all orders for this visit:    Aftercare following surgery for injury and trauma  -     XR spine cervical 2 or 3 vw injury; Future    Cervicalgia  -     XR spine cervical 2 or 3 vw injury; Future    Radiculopathy, cervical  -     XR spine cervical 2 or 3 vw injury; Future    Myelomalacia (HCC)  -     XR spine cervical 2 or 3 vw injury; Future    Other spondylosis with myelopathy, cervical region  -     XR spine cervical 2 or 3 vw injury; Future          Return in about 6 months (around 8/22/2019) for One year postoperative follow-up, review cervical spine study  Subjective:      Patient ID: Magdi Patel is a 66 y o  male  HPI    The following portions of the patient's history were reviewed and updated as appropriate: allergies, current medications, past family history, past medical history, past social history and past surgical history  Review of Systems   Constitutional: Negative  HENT: Negative  Eyes: Negative  Respiratory: Negative  Cardiovascular: Negative  Gastrointestinal: Negative  Endocrine: Negative  Genitourinary: Negative  Musculoskeletal: Positive for back pain and neck pain (radiates to both arms and hands)  Negative for neck stiffness  Skin: Negative  Allergic/Immunologic: Negative  Neurological: Positive for numbness (and tingling on both hands)   Negative for dizziness, tremors, seizures, syncope, facial asymmetry, speech difficulty, light-headedness and headaches  Hematological: Negative  Psychiatric/Behavioral: Negative  Objective:    Physical Exam   Constitutional: He is oriented to person, place, and time  He appears well-developed and well-nourished  HENT:   Head: Normocephalic and atraumatic  Eyes: Pupils are equal, round, and reactive to light  EOM are normal    Cardiovascular: Normal rate  Pulmonary/Chest: Effort normal and breath sounds normal    Neurological: He is alert and oriented to person, place, and time  Skin: Skin is warm and dry  Psychiatric: He has a normal mood and affect  Neurologic Exam     Mental Status   Oriented to person, place, and time  Cranial Nerves     CN III, IV, VI   Pupils are equal, round, and reactive to light  Extraocular motions are normal           CERVICAL SPINE   2/18/19     INDICATION:   G95 89: Other specified diseases of spinal cord  M47 12: Other spondylosis with myelopathy, cervical region      COMPARISON:  9/25/2018 x-rays     VIEWS:  XR SPINE CERVICAL 2 OR 3 VW INJURY   Images: 3     FINDINGS:  Status post C3-T1 level posterior fixation, unchanged    C3-C6 laminectomy again noted      No evidence of fracture       Normal alignment without subluxation      Persistent advanced multilevel degenerative spondylosis, most pronounced C6-7     The prevertebral soft tissues are within normal limits        The lung apices are clear      IMPRESSION:     Stable postoperative appearance C2-T1 posterior fusion

## 2019-02-22 NOTE — PATIENT INSTRUCTIONS
Continue with all usual activities as tolerated  Further follow-up with Neurosurgery is planned in approximately 6 months (1 year postoperative follow-up) Dr Loretta Hurtado [facility])    Plain films of the cervical spine a few days prior to follow-up visit  Call with any new symptoms or significant changes

## 2019-02-22 NOTE — LETTER
February 22, 2019     Sarika Griffin MD  [facility]  [address]    Patient: Keith Campuzano   YOB: 1955   Date of Visit: 2/22/2019       Dear Dr Atiya Mendez: Thank you for referring Keith Campuzano to me for evaluation  Below are my notes for this consultation  If you have questions, please do not hesitate to call me  I look forward to following your patient along with you  Sincerely,        Regis Sánchez MD        CC: No Recipients  Zenaida Chance PA-C  2/22/2019  1:23 PM  Sign at close encounter  Assessment/Plan:    Very pleasant 65-year-old male, returns for six-month postoperative follow-up  Patient is status post "Posterior cervical decompression/laminectomy C3-5, superior 6, fixation fusion C2- T1 ", performed by Dr Jose Luis Sterling, 8/16/18  He has had updated plain films of the cervical spine as requested 2/18/19  The studies were carefully reviewed in detail by Dr Jose Luis Sterling, and compared with prior studies, 9/25/18, and 8/19/18, the internal fixator hardware is stable and remains in ideal position  Overall he reports he continues to improve slowly, still has complaint of numbness and tingling sensation particularly right shoulder, right forearm, and right hand greater than left, he notes his  strength in the right is decreased when compared to his left  Reports he has improved ability to manage feeding with a knife and fork, he continues to have significant difficulty managing buttons  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  He reports some improvement in his gait, he denies falling or almost falls, he does continue to use a single-point cane for gait balance stability  He reports bladder issues are essentially resolved      Plain films of the cervical spine 2/18/19 year carefully reviewed in detail by Dr Jose Luis Sterling, and compared with prior studies of 9/25/18 overall the instrumentation remains ideally placed  On examination today motor examination of the upper extremities is essentially 5 x 5 for power,  strength is also 5 x 5 (there may be a trace of decrease in the right compared to the left), surgical site posterior neck is well-healed although is a prominence at the C7-T1 construct area  This could surgical scar is mature  Further follow-up with Neurosurgery is planned in approximately 6 months (1 year postop) with repeat plain films of the cervical spine  He reports he has added B complex and B6 to his vitamin regimen  I did discuss addition of medications such as Neurontin and/or pain management consultation relative to his radicular symptoms in his upper extremities, he felt as though that this is not significant enough at this time and did not feel the need for this  These findings, impressions and recommendations are reviewed in great detail with the patient, he expressed understanding and agreement, his questions were answered completely and to his satisfaction  Follow up has been scheduled  Diagnoses and all orders for this visit:    Aftercare following surgery for injury and trauma  -     XR spine cervical 2 or 3 vw injury; Future    Cervicalgia  -     XR spine cervical 2 or 3 vw injury; Future    Radiculopathy, cervical  -     XR spine cervical 2 or 3 vw injury; Future    Myelomalacia (HCC)  -     XR spine cervical 2 or 3 vw injury; Future    Other spondylosis with myelopathy, cervical region  -     XR spine cervical 2 or 3 vw injury; Future          Return in about 6 months (around 8/22/2019) for One year postoperative follow-up, review cervical spine study  Subjective:      Patient ID: Candy Haynes is a 66 y o  male  HPI    The following portions of the patient's history were reviewed and updated as appropriate: allergies, current medications, past family history, past medical history, past social history and past surgical history      Review of Systems Constitutional: Negative  HENT: Negative  Eyes: Negative  Respiratory: Negative  Cardiovascular: Negative  Gastrointestinal: Negative  Endocrine: Negative  Genitourinary: Negative  Musculoskeletal: Positive for back pain and neck pain (radiates to both arms and hands)  Negative for neck stiffness  Skin: Negative  Allergic/Immunologic: Negative  Neurological: Positive for numbness (and tingling on both hands)  Negative for dizziness, tremors, seizures, syncope, facial asymmetry, speech difficulty, light-headedness and headaches  Hematological: Negative  Psychiatric/Behavioral: Negative  Objective:    Physical Exam   Constitutional: He is oriented to person, place, and time  He appears well-developed and well-nourished  HENT:   Head: Normocephalic and atraumatic  Eyes: Pupils are equal, round, and reactive to light  EOM are normal    Cardiovascular: Normal rate  Pulmonary/Chest: Effort normal and breath sounds normal    Neurological: He is alert and oriented to person, place, and time  Skin: Skin is warm and dry  Psychiatric: He has a normal mood and affect  Neurologic Exam     Mental Status   Oriented to person, place, and time  Cranial Nerves     CN III, IV, VI   Pupils are equal, round, and reactive to light  Extraocular motions are normal           CERVICAL SPINE   2/18/19     INDICATION:   G95 89: Other specified diseases of spinal cord  M47 12: Other spondylosis with myelopathy, cervical region      COMPARISON:  9/25/2018 x-rays     VIEWS:  XR SPINE CERVICAL 2 OR 3 VW INJURY   Images: 3     FINDINGS:  Status post C3-T1 level posterior fixation, unchanged    C3-C6 laminectomy again noted      No evidence of fracture       Normal alignment without subluxation      Persistent advanced multilevel degenerative spondylosis, most pronounced C6-7     The prevertebral soft tissues are within normal limits        The lung apices are clear      IMPRESSION:     Stable postoperative appearance C2-T1 posterior fusion

## 2019-06-24 NOTE — PLAN OF CARE
DISCHARGE PLANNING     Discharge to home or other facility with appropriate resources Progressing        INFECTION - ADULT     Absence or prevention of progression during hospitalization Progressing        Knowledge Deficit     Patient/family/caregiver demonstrates understanding of disease process, treatment plan, medications, and discharge instructions Progressing        Nutrition/Hydration-ADULT     Nutrient/Hydration intake appropriate for improving, restoring or maintaining nutritional needs Progressing        PAIN - ADULT     Verbalizes/displays adequate comfort level or baseline comfort level Progressing        Potential for Falls     Patient will remain free of falls Progressing        SAFETY ADULT     Maintain or return to baseline ADL function Progressing     Maintain or return mobility status to optimal level Progressing     Patient will remain free of falls Progressing Please notify patient negative urine culture results- no urinary tract infection

## 2019-08-19 ENCOUNTER — HOSPITAL ENCOUNTER (OUTPATIENT)
Dept: RADIOLOGY | Facility: HOSPITAL | Age: 64
Discharge: HOME | End: 2019-08-19
Payer: COMMERCIAL

## 2019-08-19 DIAGNOSIS — G95.89 MYELOMALACIA (HCC): ICD-10-CM

## 2019-08-19 DIAGNOSIS — M54.12 RADICULOPATHY, CERVICAL: ICD-10-CM

## 2019-08-19 DIAGNOSIS — Z48.89 AFTERCARE FOLLOWING SURGERY FOR INJURY AND TRAUMA: ICD-10-CM

## 2019-08-19 DIAGNOSIS — M54.2 CERVICALGIA: ICD-10-CM

## 2019-08-19 DIAGNOSIS — M47.12 OTHER SPONDYLOSIS WITH MYELOPATHY, CERVICAL REGION: ICD-10-CM

## 2019-08-19 PROCEDURE — 72040 X-RAY EXAM NECK SPINE 2-3 VW: CPT

## 2019-08-23 ENCOUNTER — OFFICE VISIT (OUTPATIENT)
Dept: NEUROSURGERY | Facility: CLINIC | Age: 64
End: 2019-08-23
Payer: COMMERCIAL

## 2019-08-23 VITALS
SYSTOLIC BLOOD PRESSURE: 150 MMHG | HEIGHT: 69 IN | BODY MASS INDEX: 18.51 KG/M2 | DIASTOLIC BLOOD PRESSURE: 70 MMHG | RESPIRATION RATE: 16 BRPM | TEMPERATURE: 98.3 F | WEIGHT: 125 LBS

## 2019-08-23 DIAGNOSIS — Z48.89 AFTERCARE FOLLOWING SURGERY FOR INJURY AND TRAUMA: Primary | ICD-10-CM

## 2019-08-23 DIAGNOSIS — G95.89 MYELOMALACIA (HCC): ICD-10-CM

## 2019-08-23 DIAGNOSIS — M47.12 OTHER SPONDYLOSIS WITH MYELOPATHY, CERVICAL REGION: ICD-10-CM

## 2019-08-23 DIAGNOSIS — Z98.890 HISTORY OF CERVICAL SPINAL SURGERY: ICD-10-CM

## 2019-08-23 PROCEDURE — 99213 OFFICE O/P EST LOW 20 MIN: CPT | Performed by: PHYSICIAN ASSISTANT

## 2019-08-23 NOTE — PROGRESS NOTES
Patient ID: Jose Berry is a 60 y o  male  Diagnoses and all orders for this visit:    Aftercare following surgery for injury and trauma  -     XR spine cervical 2 or 3 vw injury; Future    Other spondylosis with myelopathy, cervical region  -     XR spine cervical 2 or 3 vw injury; Future    Myelomalacia (HCC)  -     XR spine cervical 2 or 3 vw injury; Future    History of cervical spinal surgery  -     XR spine cervical 2 or 3 vw injury; Future          Assessment/Plan:  Very pleasant 62-year-old male, returns for 1 year postoperative follow-up  He has history of:  "Posterior cervical decompression/laminectomy C3-5, superior 6, fixation fusion C2- T1", 8/16/18, by Dr Tesfaye Altman  Returns today reports his numbness and tingling sensation in his hands is about the same, he has chronic bilateral shoulder pain, he is able to lift both arms over his head without difficulty  He continues to use a single-point cane for gait and balance stability and does report falls occasionally with certain motions and not being attentive to his position change  He reports these symptoms are not worsening since surgery, they are just not improving as much as he had expected/hoped  He denies bowel or bladder symptoms  He denies difficulty utilizing a knife and fork to feed himself, he denies dropping things  On examination today:  motor exam of the upper extremities is 5 x 5 throughout,  strength is also 5 x 5  Reflexes triceps and biceps are +2 and symmetric, Marissa test is negative  Cervical range of motion is almost full  He has had updated plain films of the cervical spine, 8/19/19  These were carefully reviewed in detail, and reveal the internal construct remains ideally placed, stable, without evidence of failure      He does make note he had a fall approximately a month prior to the last imaging study, landing on his face, and had some increased numbness and tingling sensation in both hands, but this has since resolved  He also reports a 2nd fall a day or 2 after the most recent imaging, no residual symptoms from that fall  A updated study of the cervical spine is requested, too ensure instrument stability  Patient will be called with results  At this juncture the patient elects to follow with Neurosurgery on an as-needed basis in the future  He does understand should he have any new symptoms or change in symptoms he has return sooner for reassessment  These findings, impressions and recommendations are reviewed in great detail with the patient, he expressed understanding and agreement, his questions were answered completely and to his satisfaction  Metrics: (the patient reports he received this in the mail last week, completed and placed in the [place] on Monday (5 days ago) to return )        Return if symptoms worsen or fail to improve  Chief Complaint  One year follow-up, posterior cervical surgery    HPI       The following portions of the patient's history were reviewed and updated as appropriate: allergies, current medications, past family history, past medical history, past social history and past surgical history  Review of Systems   Constitutional: Negative  HENT: Negative  Eyes: Negative  Respiratory: Negative  Cardiovascular: Negative  Gastrointestinal: Negative  Endocrine: Negative  Genitourinary: Negative  Musculoskeletal: Positive for neck pain (radiates to b/l arms)  Skin: Negative  Allergic/Immunologic: Negative  Neurological: Positive for numbness (and tingling on both hands)  Hematological: Bruises/bleeds easily (Aspirin 325mg)  All other systems reviewed and are negative  Objective:    Physical Exam   Constitutional: He is oriented to person, place, and time  He appears well-developed and well-nourished  HENT:   Head: Normocephalic and atraumatic  Eyes: Pupils are equal, round, and reactive to light   EOM are normal  Cardiovascular: Normal rate  Pulmonary/Chest: Effort normal and breath sounds normal    Neurological: He is alert and oriented to person, place, and time  Skin: Skin is warm and dry  Psychiatric: He has a normal mood and affect  Vitals reviewed  Neurologic Exam     Mental Status   Oriented to person, place, and time  Cranial Nerves     CN III, IV, VI   Pupils are equal, round, and reactive to light  Extraocular motions are normal           CERVICAL SPINE   8/19/19     INDICATION:   Z48 89: Encounter for other specified surgical aftercare  M54 2: Cervicalgia  M54 12: Radiculopathy, cervical region  G95 89: Other specified diseases of spinal cord  M47 12: Other spondylosis with myelopathy, cervical region      COMPARISON:  2/18/2019     VIEWS:  XR SPINE CERVICAL 2 OR 3 VW INJURY         FINDINGS:     No evidence of fracture or subluxation       As on prior, patient is status post posterior spinal hardware fusion which extends from C2 through T1  Appearance of hardware unchanged from prior study  Osseous alignment has not changed from prior study    Preexistent discogenic degenerative changes   noted       The prevertebral soft tissues are within normal limits        The lung apices are intact      IMPRESSION:     In comparison to the most recent pertinent exam, there is an unchanged postoperative appearance/alignment of the cervical spine following posterior hardware fusion

## 2019-08-23 NOTE — LETTER
August 23, 2019     Cyndi Contreras MD  [address]  [address]    Patient: Jose Berry   YOB: 1955   Date of Visit: 8/23/2019       Dear Dr Yves Villalobos: Thank you for referring Jose Berry to me for evaluation  Below are my notes for this consultation  If you have questions, please do not hesitate to call me  I look forward to following your patient along with you  Sincerely,        Rigo Palumbo PA-C        CC: No Recipients  Rigo Palumbo PA-C  8/23/2019 11:50 AM  Sign at close encounter  Patient ID: Jose Berry is a 60 y o  male  Diagnoses and all orders for this visit:    Aftercare following surgery for injury and trauma  -     XR spine cervical 2 or 3 vw injury; Future    Other spondylosis with myelopathy, cervical region  -     XR spine cervical 2 or 3 vw injury; Future    Myelomalacia (HCC)  -     XR spine cervical 2 or 3 vw injury; Future    History of cervical spinal surgery  -     XR spine cervical 2 or 3 vw injury; Future          Assessment/Plan:  Very pleasant 21-year-old male, returns for 1 year postoperative follow-up  He has history of:  "Posterior cervical decompression/laminectomy C3-5, superior 6, fixation fusion C2- T1", 8/16/18, by Dr Tesfaye Altman  Returns today reports his numbness and tingling sensation in his hands is about the same, he has chronic bilateral shoulder pain, he is able to lift both arms over his head without difficulty  He continues to use a single-point cane for gait and balance stability and does report falls occasionally with certain motions and not being attentive to his position change  He reports these symptoms are not worsening since surgery, they are just not improving as much as he had expected/hoped  He denies bowel or bladder symptoms  He denies difficulty utilizing a knife and fork to feed himself, he denies dropping things      On examination today:  motor exam of the upper extremities is 5 x 5 throughout,  strength is also 5 x 5  Reflexes triceps and biceps are +2 and symmetric, Marissa test is negative  Cervical range of motion is almost full  He has had updated plain films of the cervical spine, 8/19/19  These were carefully reviewed in detail, and reveal the internal construct remains ideally placed, stable, without evidence of failure  He does make note he had a fall approximately a month prior to the last imaging study, landing on his face, and had some increased numbness and tingling sensation in both hands, but this has since resolved  He also reports a 2nd fall a day or 2 after the most recent imaging, no residual symptoms from that fall  A updated study of the cervical spine is requested, too ensure instrument stability  Patient will be called with results  At this juncture the patient elects to follow with Neurosurgery on an as-needed basis in the future  He does understand should he have any new symptoms or change in symptoms he has return sooner for reassessment  These findings, impressions and recommendations are reviewed in great detail with the patient, he expressed understanding and agreement, his questions were answered completely and to his satisfaction  Metrics: (the patient reports he received this in the mail last week, completed and placed in the [place] on Monday (5 days ago) to return )        Return if symptoms worsen or fail to improve  Chief Complaint  One year follow-up, posterior cervical surgery    HPI       The following portions of the patient's history were reviewed and updated as appropriate: allergies, current medications, past family history, past medical history, past social history and past surgical history  Review of Systems   Constitutional: Negative  HENT: Negative  Eyes: Negative  Respiratory: Negative  Cardiovascular: Negative  Gastrointestinal: Negative  Endocrine: Negative  Genitourinary: Negative  Musculoskeletal: Positive for neck pain (radiates to b/l arms)  Skin: Negative  Allergic/Immunologic: Negative  Neurological: Positive for numbness (and tingling on both hands)  Hematological: Bruises/bleeds easily (Aspirin 325mg)  All other systems reviewed and are negative  Objective:    Physical Exam   Constitutional: He is oriented to person, place, and time  He appears well-developed and well-nourished  HENT:   Head: Normocephalic and atraumatic  Eyes: Pupils are equal, round, and reactive to light  EOM are normal    Cardiovascular: Normal rate  Pulmonary/Chest: Effort normal and breath sounds normal    Neurological: He is alert and oriented to person, place, and time  Skin: Skin is warm and dry  Psychiatric: He has a normal mood and affect  Vitals reviewed  Neurologic Exam     Mental Status   Oriented to person, place, and time  Cranial Nerves     CN III, IV, VI   Pupils are equal, round, and reactive to light  Extraocular motions are normal           CERVICAL SPINE   8/19/19     INDICATION:   Z48 89: Encounter for other specified surgical aftercare  M54 2: Cervicalgia  M54 12: Radiculopathy, cervical region  G95 89: Other specified diseases of spinal cord  M47 12: Other spondylosis with myelopathy, cervical region      COMPARISON:  2/18/2019     VIEWS:  XR SPINE CERVICAL 2 OR 3 VW INJURY         FINDINGS:     No evidence of fracture or subluxation       As on prior, patient is status post posterior spinal hardware fusion which extends from C2 through T1  Appearance of hardware unchanged from prior study  Osseous alignment has not changed from prior study    Preexistent discogenic degenerative changes   noted       The prevertebral soft tissues are within normal limits        The lung apices are intact      IMPRESSION:     In comparison to the most recent pertinent exam, there is an unchanged postoperative appearance/alignment of the cervical spine following posterior hardware fusion

## 2019-08-23 NOTE — PATIENT INSTRUCTIONS
Continue with all usual activities as tolerated  Further follow-up with Neurosurgery will be on an as-needed basis  Return with any worsening symptoms

## 2020-05-13 ENCOUNTER — TELEMEDICINE (OUTPATIENT)
Dept: FAMILY MEDICINE CLINIC | Facility: CLINIC | Age: 65
End: 2020-05-13
Payer: COMMERCIAL

## 2020-05-13 VITALS
HEART RATE: 56 BPM | SYSTOLIC BLOOD PRESSURE: 122 MMHG | WEIGHT: 132 LBS | HEIGHT: 69 IN | BODY MASS INDEX: 19.55 KG/M2 | DIASTOLIC BLOOD PRESSURE: 69 MMHG

## 2020-05-13 DIAGNOSIS — R31.21 ASYMPTOMATIC MICROSCOPIC HEMATURIA: ICD-10-CM

## 2020-05-13 DIAGNOSIS — Z98.890 HISTORY OF CERVICAL SPINAL SURGERY: ICD-10-CM

## 2020-05-13 DIAGNOSIS — E87.1 HYPONATREMIA: Primary | ICD-10-CM

## 2020-05-13 PROBLEM — M46.46 DISCITIS OF LUMBAR REGION: Status: ACTIVE | Noted: 2020-05-13

## 2020-05-13 PROCEDURE — 99214 OFFICE O/P EST MOD 30 MIN: CPT | Performed by: FAMILY MEDICINE

## 2020-05-13 PROCEDURE — 3078F DIAST BP <80 MM HG: CPT | Performed by: FAMILY MEDICINE

## 2020-05-13 PROCEDURE — 3074F SYST BP LT 130 MM HG: CPT | Performed by: FAMILY MEDICINE

## 2020-05-13 PROCEDURE — 3008F BODY MASS INDEX DOCD: CPT | Performed by: FAMILY MEDICINE

## 2020-05-13 RX ORDER — ASPIRIN 325 MG
325 TABLET ORAL
COMMUNITY

## 2020-05-29 ENCOUNTER — TELEPHONE (OUTPATIENT)
Dept: FAMILY MEDICINE CLINIC | Facility: CLINIC | Age: 65
End: 2020-05-29

## 2020-05-29 DIAGNOSIS — I48.91 ATRIAL FIBRILLATION, UNSPECIFIED TYPE (HCC): Primary | ICD-10-CM

## 2020-05-29 DIAGNOSIS — I10 BENIGN ESSENTIAL HTN: ICD-10-CM

## 2020-05-31 RX ORDER — LOSARTAN POTASSIUM 100 MG/1
100 TABLET ORAL DAILY
Qty: 90 TABLET | Refills: 1 | Status: SHIPPED | OUTPATIENT
Start: 2020-05-31 | End: 2020-11-24

## 2020-05-31 RX ORDER — SOTALOL HYDROCHLORIDE 120 MG/1
120 TABLET ORAL 2 TIMES DAILY
Qty: 180 TABLET | Refills: 1 | Status: SHIPPED | OUTPATIENT
Start: 2020-05-31 | End: 2021-05-16

## 2020-09-16 DIAGNOSIS — K21.9 GASTROESOPHAGEAL REFLUX DISEASE WITHOUT ESOPHAGITIS: Primary | ICD-10-CM

## 2020-09-16 RX ORDER — OMEPRAZOLE 20 MG/1
20 CAPSULE, DELAYED RELEASE ORAL AS NEEDED
Qty: 90 CAPSULE | Refills: 1 | Status: SHIPPED | OUTPATIENT
Start: 2020-09-16 | End: 2021-03-19

## 2020-11-17 ENCOUNTER — HOSPITAL ENCOUNTER (EMERGENCY)
Facility: HOSPITAL | Age: 65
Discharge: HOME | End: 2020-11-17
Attending: EMERGENCY MEDICINE
Payer: COMMERCIAL

## 2020-11-17 ENCOUNTER — APPOINTMENT (EMERGENCY)
Dept: RADIOLOGY | Facility: HOSPITAL | Age: 65
End: 2020-11-17
Payer: COMMERCIAL

## 2020-11-17 VITALS
RESPIRATION RATE: 18 BRPM | SYSTOLIC BLOOD PRESSURE: 184 MMHG | HEART RATE: 57 BPM | DIASTOLIC BLOOD PRESSURE: 78 MMHG | OXYGEN SATURATION: 100 % | TEMPERATURE: 97.6 F

## 2020-11-17 DIAGNOSIS — S22.49XA RIB FRACTURES: Primary | ICD-10-CM

## 2020-11-17 PROCEDURE — 99284 EMERGENCY DEPT VISIT MOD MDM: CPT | Performed by: EMERGENCY MEDICINE

## 2020-11-17 PROCEDURE — 71101 X-RAY EXAM UNILAT RIBS/CHEST: CPT

## 2020-11-17 PROCEDURE — 99283 EMERGENCY DEPT VISIT LOW MDM: CPT

## 2020-11-17 RX ORDER — ACETAMINOPHEN 325 MG/1
650 TABLET ORAL ONCE
Status: COMPLETED | OUTPATIENT
Start: 2020-11-17 | End: 2020-11-17

## 2020-11-17 RX ORDER — IBUPROFEN 600 MG/1
600 TABLET ORAL ONCE
Status: COMPLETED | OUTPATIENT
Start: 2020-11-17 | End: 2020-11-17

## 2020-11-17 RX ORDER — HYDROCODONE BITARTRATE AND ACETAMINOPHEN 5; 325 MG/1; MG/1
1-2 TABLET ORAL EVERY 6 HOURS PRN
Qty: 40 TABLET | Refills: 0 | Status: SHIPPED | OUTPATIENT
Start: 2020-11-17 | End: 2020-12-07

## 2020-11-17 RX ADMIN — IBUPROFEN 600 MG: 600 TABLET, FILM COATED ORAL at 04:30

## 2020-11-17 RX ADMIN — ACETAMINOPHEN 650 MG: 325 TABLET, FILM COATED ORAL at 04:30

## 2020-11-20 ENCOUNTER — OFFICE VISIT (OUTPATIENT)
Dept: FAMILY MEDICINE CLINIC | Facility: CLINIC | Age: 65
End: 2020-11-20
Payer: COMMERCIAL

## 2020-11-20 VITALS
OXYGEN SATURATION: 98 % | DIASTOLIC BLOOD PRESSURE: 90 MMHG | HEART RATE: 65 BPM | TEMPERATURE: 96.8 F | HEIGHT: 69 IN | WEIGHT: 131 LBS | SYSTOLIC BLOOD PRESSURE: 152 MMHG | BODY MASS INDEX: 19.4 KG/M2 | RESPIRATION RATE: 16 BRPM

## 2020-11-20 DIAGNOSIS — R07.81 RIB PAIN ON LEFT SIDE: ICD-10-CM

## 2020-11-20 DIAGNOSIS — S22.32XD CLOSED FRACTURE OF ONE RIB OF LEFT SIDE WITH ROUTINE HEALING, SUBSEQUENT ENCOUNTER: Primary | ICD-10-CM

## 2020-11-20 PROCEDURE — 99213 OFFICE O/P EST LOW 20 MIN: CPT | Performed by: NURSE PRACTITIONER

## 2020-11-24 DIAGNOSIS — I10 BENIGN ESSENTIAL HTN: ICD-10-CM

## 2020-11-24 RX ORDER — LOSARTAN POTASSIUM 100 MG/1
TABLET ORAL
Qty: 90 TABLET | Refills: 1 | Status: SHIPPED | OUTPATIENT
Start: 2020-11-24 | End: 2021-05-17

## 2020-12-22 ENCOUNTER — OFFICE VISIT (OUTPATIENT)
Dept: FAMILY MEDICINE CLINIC | Facility: CLINIC | Age: 65
End: 2020-12-22
Payer: COMMERCIAL

## 2020-12-22 VITALS
TEMPERATURE: 97.7 F | WEIGHT: 133 LBS | DIASTOLIC BLOOD PRESSURE: 88 MMHG | SYSTOLIC BLOOD PRESSURE: 148 MMHG | BODY MASS INDEX: 19.7 KG/M2 | OXYGEN SATURATION: 98 % | HEART RATE: 58 BPM | HEIGHT: 69 IN | RESPIRATION RATE: 18 BRPM

## 2020-12-22 DIAGNOSIS — E87.1 HYPONATREMIA: ICD-10-CM

## 2020-12-22 DIAGNOSIS — R53.82 CHRONIC FATIGUE: ICD-10-CM

## 2020-12-22 DIAGNOSIS — I10 BENIGN ESSENTIAL HTN: ICD-10-CM

## 2020-12-22 DIAGNOSIS — Z11.59 NEED FOR HEPATITIS C SCREENING TEST: ICD-10-CM

## 2020-12-22 DIAGNOSIS — I48.91 ATRIAL FIBRILLATION, UNSPECIFIED TYPE (HCC): ICD-10-CM

## 2020-12-22 DIAGNOSIS — Z12.5 SCREENING FOR PROSTATE CANCER: ICD-10-CM

## 2020-12-22 DIAGNOSIS — K40.90 INGUINAL HERNIA OF LEFT SIDE WITHOUT OBSTRUCTION OR GANGRENE: Primary | ICD-10-CM

## 2020-12-22 DIAGNOSIS — E78.2 MIXED HYPERLIPIDEMIA: ICD-10-CM

## 2020-12-22 DIAGNOSIS — R31.21 ASYMPTOMATIC MICROSCOPIC HEMATURIA: ICD-10-CM

## 2020-12-22 PROCEDURE — 3079F DIAST BP 80-89 MM HG: CPT | Performed by: FAMILY MEDICINE

## 2020-12-22 PROCEDURE — 1036F TOBACCO NON-USER: CPT | Performed by: FAMILY MEDICINE

## 2020-12-22 PROCEDURE — 3077F SYST BP >= 140 MM HG: CPT | Performed by: FAMILY MEDICINE

## 2020-12-22 PROCEDURE — 3725F SCREEN DEPRESSION PERFORMED: CPT | Performed by: FAMILY MEDICINE

## 2020-12-22 PROCEDURE — 3008F BODY MASS INDEX DOCD: CPT | Performed by: FAMILY MEDICINE

## 2020-12-22 PROCEDURE — 99215 OFFICE O/P EST HI 40 MIN: CPT | Performed by: FAMILY MEDICINE

## 2020-12-22 RX ORDER — AMLODIPINE BESYLATE 5 MG/1
5 TABLET ORAL DAILY
Qty: 90 TABLET | Refills: 3 | Status: SHIPPED | OUTPATIENT
Start: 2020-12-22 | End: 2021-11-11 | Stop reason: SDUPTHER

## 2021-01-07 ENCOUNTER — OFFICE VISIT (OUTPATIENT)
Dept: SURGERY | Facility: CLINIC | Age: 66
End: 2021-01-07
Payer: COMMERCIAL

## 2021-01-07 VITALS
SYSTOLIC BLOOD PRESSURE: 130 MMHG | RESPIRATION RATE: 18 BRPM | WEIGHT: 132 LBS | BODY MASS INDEX: 18.9 KG/M2 | TEMPERATURE: 97.2 F | HEIGHT: 70 IN | HEART RATE: 69 BPM | DIASTOLIC BLOOD PRESSURE: 80 MMHG

## 2021-01-07 DIAGNOSIS — Z01.818 ENCOUNTER FOR PREADMISSION TESTING: Primary | ICD-10-CM

## 2021-01-07 DIAGNOSIS — K40.90 INGUINAL HERNIA OF LEFT SIDE WITHOUT OBSTRUCTION OR GANGRENE: Primary | ICD-10-CM

## 2021-01-07 PROCEDURE — 3008F BODY MASS INDEX DOCD: CPT | Performed by: SURGERY

## 2021-01-07 PROCEDURE — 3079F DIAST BP 80-89 MM HG: CPT | Performed by: SURGERY

## 2021-01-07 PROCEDURE — 3075F SYST BP GE 130 - 139MM HG: CPT | Performed by: SURGERY

## 2021-01-07 PROCEDURE — 1036F TOBACCO NON-USER: CPT | Performed by: SURGERY

## 2021-01-07 PROCEDURE — 99204 OFFICE O/P NEW MOD 45 MIN: CPT | Performed by: SURGERY

## 2021-01-07 NOTE — H&P (VIEW-ONLY)
Assessment/Plan:  Patient has left-sided reducible inguinal hernia  I discussed various modalities for hernia repair  He wants to get it done open  I explained the procedure to him  We discussed about possible complications including but not limited to bleeding, infection, infection of the mesh, chronic groin pain, testicular swelling  He verbalized understanding  This will be stitched scheduled under general anesthesia at [facility]  I told him to stop taking aspirin 7-10 days prior to the operation  No problem-specific Assessment & Plan notes found for this encounter  Diagnoses and all orders for this visit:    Inguinal hernia of left side without obstruction or gangrene  -     Ambulatory referral to General Surgery          Subjective:      Patient ID: Evelin Gaspar is a 63 y o  male  81-year-old male patient came to my office with complaints of left inguinal swelling  He said that he observe did 2 weeks ago  He tells me that he is able to push it back  He also tells me that it the goes back when he lays flat  No complaints of pain in the swelling currently  He is able to tolerate diet  He is having regular bowel movement and flatus  No complaints of symptoms of BPH  No complaints of blood in stool  Patient takes 325 mg of aspirin daily        The following portions of the patient's history were reviewed and updated as appropriate:   He  has a past medical history of A-fib ([address] ) (01/01/2005), 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 (03/10/2017), Anemia (12/31/2015), Anxiety, Back ache (08/31/2017), Diarrhea (08/31/2017), Diverticula of colon (01/01/2012), Family history of prostate cancer, GERD (gastroesophageal reflux disease), GERD with esophagitis (08/31/2017), Gout (08/31/2017), Hordeolum externum of lower eyelid (08/31/2017), Hyperlipidemia (05/01/2017), Hypertension, essential (05/01/2017), Hyponatremia (07/10/2014), Loss of taste (05/01/2017), Muscle strain (08/31/2017), Osteoarthritis, Paresthesia (06/08/2018), Raised prostate specific antigen (05/01/2017), Skin lesion (03/08/2018), Spinal stenosis of lumbar region (02/06/2017), Steatosis of liver (08/31/2017), and Weight loss, unintentional (08/31/2017)  He   Patient Active Problem List    Diagnosis Date Noted    Rib pain on left side 11/20/2020    Rib fractures 11/17/2020    Discitis of lumbar region 05/13/2020    Asymptomatic microscopic hematuria 05/13/2020    History of cervical spinal surgery 08/23/2019    Constipation 08/23/2018    Hyponatremia 08/20/2018    Acute blood loss anemia 08/20/2018    Benign essential HTN 08/20/2018    [address] [address] ) 08/01/2018    Other spondylosis with myelopathy, cervical region 08/01/2018     He  has a past surgical history that includes Other surgical history (03/10/2017); Chest tube insertion (1980); pr arthrodesis posterior/posteriorlateral cervical below c2 (N/A, 8/16/2018); Colonoscopy (11/2018); Back surgery; and Cervical spine surgery (08/2018)  His family history includes Kidney disease in his father; Pancreatic cancer in his mother; Prostate cancer in his father  He  reports that he quit smoking about 41 years ago  His smoking use included cigarettes  He has never used smokeless tobacco  He reports current alcohol use of about 5 0 standard drinks of alcohol per week  He reports that he does not use drugs    Current Outpatient Medications   Medication Sig Dispense Refill    acetaminophen (TYLENOL) 325 mg tablet Acetaminophen 650 mg p o  q4H MILD pain 30 tablet 0    allopurinol (ZYLOPRIM) 100 mg tablet Take 100 mg by mouth daily      amLODIPine (NORVASC) 5 mg tablet Take 1 tablet (5 mg total) by mouth daily 90 tablet 3    aspirin 325 mg tablet Take 325 mg by mouth every other day as needed for mild pain      losartan (COZAAR) 100 MG tablet TAKE 1 TABLET BY MOUTH EVERY DAY 90 tablet 1    omeprazole (PriLOSEC) 20 mg delayed release capsule Take 1 capsule (20 mg total) by mouth as needed (GERD) 90 capsule 1    sotalol (BETAPACE) 120 mg tablet Take 1 tablet (120 mg total) by mouth 2 (two) times a day 180 tablet 1    ferrous sulfate 325 (65 Fe) mg tablet Take 1 tablet (325 mg total) by mouth daily with breakfast (Patient not taking: Reported on 1/7/2021) 30 tablet 0     No current facility-administered medications for this visit  Current Outpatient Medications on File Prior to Visit   Medication Sig    acetaminophen (TYLENOL) 325 mg tablet Acetaminophen 650 mg p o  q4H MILD pain    allopurinol (ZYLOPRIM) 100 mg tablet Take 100 mg by mouth daily    amLODIPine (NORVASC) 5 mg tablet Take 1 tablet (5 mg total) by mouth daily    aspirin 325 mg tablet Take 325 mg by mouth every other day as needed for mild pain    losartan (COZAAR) 100 MG tablet TAKE 1 TABLET BY MOUTH EVERY DAY    omeprazole (PriLOSEC) 20 mg delayed release capsule Take 1 capsule (20 mg total) by mouth as needed (GERD)    sotalol (BETAPACE) 120 mg tablet Take 1 tablet (120 mg total) by mouth 2 (two) times a day    ferrous sulfate 325 (65 Fe) mg tablet Take 1 tablet (325 mg total) by mouth daily with breakfast (Patient not taking: Reported on 1/7/2021)     No current facility-administered medications on file prior to visit  He has No Known Allergies       Review of Systems   Constitutional: Negative  HENT: Negative  Eyes: Negative  Respiratory: Negative  Cardiovascular: Negative  Gastrointestinal: Negative  Endocrine: Negative  Genitourinary: Negative  Musculoskeletal: Negative  Skin: Negative  Allergic/Immunologic: Negative  Neurological: Negative  Hematological: Negative  Psychiatric/Behavioral: Negative            Objective:      /80 (BP Location: Right arm, Patient Position: Sitting, Cuff Size: Adult)   Pulse 69   Temp (!) 97 2 °F (36 2 °C)   Resp 18   Ht 5' 10" (1 778 m)   Wt 59 9 kg (132 lb)   BMI 18 94 kg/m²          Physical Exam  Vitals signs reviewed  Constitutional:       Appearance: Normal appearance  HENT:      Head: Normocephalic and atraumatic  Nose: Nose normal       Mouth/Throat:      Mouth: Mucous membranes are moist    Eyes:      Pupils: Pupils are equal, round, and reactive to light  Neck:      Musculoskeletal: Normal range of motion  Cardiovascular:      Rate and Rhythm: Normal rate and regular rhythm  Pulses: Normal pulses  Heart sounds: Normal heart sounds  Pulmonary:      Effort: Pulmonary effort is normal       Breath sounds: Normal breath sounds  Abdominal:      General: Abdomen is flat  There is no distension  Palpations: Abdomen is soft  There is no mass  Tenderness: There is no abdominal tenderness  There is no guarding  Hernia: A hernia (Left-sided reducible inguinal hernia ) is present  Genitourinary:     Penis: Normal        Scrotum/Testes: Normal    Musculoskeletal: Normal range of motion  Skin:     General: Skin is warm  Neurological:      General: No focal deficit present  Mental Status: He is alert and oriented to person, place, and time     Psychiatric:         Mood and Affect: Mood normal          Behavior: Behavior normal

## 2021-01-07 NOTE — H&P
Assessment/Plan:  Patient has left-sided reducible inguinal hernia  I discussed various modalities for hernia repair  He wants to get it done open  I explained the procedure to him  We discussed about possible complications including but not limited to bleeding, infection, infection of the mesh, chronic groin pain, testicular swelling  He verbalized understanding  This will be stitched scheduled under general anesthesia at [facility]  I told him to stop taking aspirin 7-10 days prior to the operation  No problem-specific Assessment & Plan notes found for this encounter  Diagnoses and all orders for this visit:    Inguinal hernia of left side without obstruction or gangrene  -     Ambulatory referral to General Surgery          Subjective:      Patient ID: Ra Dobson is a 63 y o  male  61-year-old male patient came to my office with complaints of left inguinal swelling  He said that he observe did 2 weeks ago  He tells me that he is able to push it back  He also tells me that it the goes back when he lays flat  No complaints of pain in the swelling currently  He is able to tolerate diet  He is having regular bowel movement and flatus  No complaints of symptoms of BPH  No complaints of blood in stool  Patient takes 325 mg of aspirin daily        The following portions of the patient's history were reviewed and updated as appropriate:   He  has a past medical history of A-fib ([address] ) (01/01/2005), 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 (03/10/2017), Anemia (12/31/2015), Anxiety, Back ache (08/31/2017), Diarrhea (08/31/2017), Diverticula of colon (01/01/2012), Family history of prostate cancer, GERD (gastroesophageal reflux disease), GERD with esophagitis (08/31/2017), Gout (08/31/2017), Hordeolum externum of lower eyelid (08/31/2017), Hyperlipidemia (05/01/2017), Hypertension, essential (05/01/2017), Hyponatremia (07/10/2014), Loss of taste (05/01/2017), Muscle strain (08/31/2017), Osteoarthritis, Paresthesia (06/08/2018), Raised prostate specific antigen (05/01/2017), Skin lesion (03/08/2018), Spinal stenosis of lumbar region (02/06/2017), Steatosis of liver (08/31/2017), and Weight loss, unintentional (08/31/2017)  He   Patient Active Problem List    Diagnosis Date Noted    Rib pain on left side 11/20/2020    Rib fractures 11/17/2020    Discitis of lumbar region 05/13/2020    Asymptomatic microscopic hematuria 05/13/2020    History of cervical spinal surgery 08/23/2019    Constipation 08/23/2018    Hyponatremia 08/20/2018    Acute blood loss anemia 08/20/2018    Benign essential HTN 08/20/2018    Myelomalacia ([facility] [address] ) 08/01/2018    Other spondylosis with myelopathy, cervical region 08/01/2018     He  has a past surgical history that includes Other surgical history (03/10/2017); Chest tube insertion (1980); pr arthrodesis posterior/posteriorlateral cervical below c2 (N/A, 8/16/2018); Colonoscopy (11/2018); Back surgery; and Cervical spine surgery (08/2018)  His family history includes Kidney disease in his father; Pancreatic cancer in his mother; Prostate cancer in his father  He  reports that he quit smoking about 41 years ago  His smoking use included cigarettes  He has never used smokeless tobacco  He reports current alcohol use of about 5 0 standard drinks of alcohol per week  He reports that he does not use drugs    Current Outpatient Medications   Medication Sig Dispense Refill    acetaminophen (TYLENOL) 325 mg tablet Acetaminophen 650 mg p o  q4H MILD pain 30 tablet 0    allopurinol (ZYLOPRIM) 100 mg tablet Take 100 mg by mouth daily      amLODIPine (NORVASC) 5 mg tablet Take 1 tablet (5 mg total) by mouth daily 90 tablet 3    aspirin 325 mg tablet Take 325 mg by mouth every other day as needed for mild pain      losartan (COZAAR) 100 MG tablet TAKE 1 TABLET BY MOUTH EVERY DAY 90 tablet 1    omeprazole (PriLOSEC) 20 mg delayed release capsule Take 1 capsule (20 mg total) by mouth as needed (GERD) 90 capsule 1    sotalol (BETAPACE) 120 mg tablet Take 1 tablet (120 mg total) by mouth 2 (two) times a day 180 tablet 1    ferrous sulfate 325 (65 Fe) mg tablet Take 1 tablet (325 mg total) by mouth daily with breakfast (Patient not taking: Reported on 1/7/2021) 30 tablet 0     No current facility-administered medications for this visit  Current Outpatient Medications on File Prior to Visit   Medication Sig    acetaminophen (TYLENOL) 325 mg tablet Acetaminophen 650 mg p o  q4H MILD pain    allopurinol (ZYLOPRIM) 100 mg tablet Take 100 mg by mouth daily    amLODIPine (NORVASC) 5 mg tablet Take 1 tablet (5 mg total) by mouth daily    aspirin 325 mg tablet Take 325 mg by mouth every other day as needed for mild pain    losartan (COZAAR) 100 MG tablet TAKE 1 TABLET BY MOUTH EVERY DAY    omeprazole (PriLOSEC) 20 mg delayed release capsule Take 1 capsule (20 mg total) by mouth as needed (GERD)    sotalol (BETAPACE) 120 mg tablet Take 1 tablet (120 mg total) by mouth 2 (two) times a day    ferrous sulfate 325 (65 Fe) mg tablet Take 1 tablet (325 mg total) by mouth daily with breakfast (Patient not taking: Reported on 1/7/2021)     No current facility-administered medications on file prior to visit  He has No Known Allergies       Review of Systems   Constitutional: Negative  HENT: Negative  Eyes: Negative  Respiratory: Negative  Cardiovascular: Negative  Gastrointestinal: Negative  Endocrine: Negative  Genitourinary: Negative  Musculoskeletal: Negative  Skin: Negative  Allergic/Immunologic: Negative  Neurological: Negative  Hematological: Negative  Psychiatric/Behavioral: Negative            Objective:      /80 (BP Location: Right arm, Patient Position: Sitting, Cuff Size: Adult)   Pulse 69   Temp (!) 97 2 °F (36 2 °C)   Resp 18   Ht 5' 10" (1 778 m)   Wt 59 9 kg (132 lb)   BMI 18 94 kg/m²          Physical Exam  Vitals signs reviewed  Constitutional:       Appearance: Normal appearance  HENT:      Head: Normocephalic and atraumatic  Nose: Nose normal       Mouth/Throat:      Mouth: Mucous membranes are moist    Eyes:      Pupils: Pupils are equal, round, and reactive to light  Neck:      Musculoskeletal: Normal range of motion  Cardiovascular:      Rate and Rhythm: Normal rate and regular rhythm  Pulses: Normal pulses  Heart sounds: Normal heart sounds  Pulmonary:      Effort: Pulmonary effort is normal       Breath sounds: Normal breath sounds  Abdominal:      General: Abdomen is flat  There is no distension  Palpations: Abdomen is soft  There is no mass  Tenderness: There is no abdominal tenderness  There is no guarding  Hernia: A hernia (Left-sided reducible inguinal hernia ) is present  Genitourinary:     Penis: Normal        Scrotum/Testes: Normal    Musculoskeletal: Normal range of motion  Skin:     General: Skin is warm  Neurological:      General: No focal deficit present  Mental Status: He is alert and oriented to person, place, and time     Psychiatric:         Mood and Affect: Mood normal          Behavior: Behavior normal

## 2021-01-07 NOTE — PROGRESS NOTES
Assessment/Plan:  Patient has left-sided reducible inguinal hernia  I discussed various modalities for hernia repair  He wants to get it done open  I explained the procedure to him  We discussed about possible complications including but not limited to bleeding, infection, infection of the mesh, chronic groin pain, testicular swelling  He verbalized understanding  This will be stitched scheduled under general anesthesia at [facility]  I told him to stop taking aspirin 7-10 days prior to the operation  No problem-specific Assessment & Plan notes found for this encounter  Diagnoses and all orders for this visit:    Inguinal hernia of left side without obstruction or gangrene  -     Ambulatory referral to General Surgery          Subjective:      Patient ID: Kasia Vyas is a 63 y o  male  61-year-old male patient came to my office with complaints of left inguinal swelling  He said that he observe did 2 weeks ago  He tells me that he is able to push it back  He also tells me that it the goes back when he lays flat  No complaints of pain in the swelling currently  He is able to tolerate diet  He is having regular bowel movement and flatus  No complaints of symptoms of BPH  No complaints of blood in stool  Patient takes 325 mg of aspirin daily        The following portions of the patient's history were reviewed and updated as appropriate:   He  has a past medical history of A-fib ([address] ) (01/01/2005), 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 (03/10/2017), Anemia (12/31/2015), Anxiety, Back ache (08/31/2017), Diarrhea (08/31/2017), Diverticula of colon (01/01/2012), Family history of prostate cancer, GERD (gastroesophageal reflux disease), GERD with esophagitis (08/31/2017), Gout (08/31/2017), Hordeolum externum of lower eyelid (08/31/2017), Hyperlipidemia (05/01/2017), Hypertension, essential (05/01/2017), Hyponatremia (07/10/2014), Loss of taste (05/01/2017), Muscle strain (08/31/2017), Osteoarthritis, Paresthesia (06/08/2018), Raised prostate specific antigen (05/01/2017), Skin lesion (03/08/2018), Spinal stenosis of lumbar region (02/06/2017), Steatosis of liver (08/31/2017), and Weight loss, unintentional (08/31/2017)  He   Patient Active Problem List    Diagnosis Date Noted    Rib pain on left side 11/20/2020    Rib fractures 11/17/2020    Discitis of lumbar region 05/13/2020    Asymptomatic microscopic hematuria 05/13/2020    History of cervical spinal surgery 08/23/2019    Constipation 08/23/2018    Hyponatremia 08/20/2018    Acute blood loss anemia 08/20/2018    Benign essential HTN 08/20/2018    Myel[address]acia ([facility] [address] ) 08/01/2018    Other spondylosis with myelopathy, cervical region 08/01/2018     He  has a past surgical history that includes Other surgical history (03/10/2017); Chest tube insertion (1980); pr arthrodesis posterior/posteriorlateral cervical below c2 (N/A, 8/16/2018); Colonoscopy (11/2018); Back surgery; and Cervical spine surgery (08/2018)  His family history includes Kidney disease in his father; Pancreatic cancer in his mother; Prostate cancer in his father  He  reports that he quit smoking about 41 years ago  His smoking use included cigarettes  He has never used smokeless tobacco  He reports current alcohol use of about 5 0 standard drinks of alcohol per week  He reports that he does not use drugs    Current Outpatient Medications   Medication Sig Dispense Refill    acetaminophen (TYLENOL) 325 mg tablet Acetaminophen 650 mg p o  q4H MILD pain 30 tablet 0    allopurinol (ZYLOPRIM) 100 mg tablet Take 100 mg by mouth daily      amLODIPine (NORVASC) 5 mg tablet Take 1 tablet (5 mg total) by mouth daily 90 tablet 3    aspirin 325 mg tablet Take 325 mg by mouth every other day as needed for mild pain      losartan (COZAAR) 100 MG tablet TAKE 1 TABLET BY MOUTH EVERY DAY 90 tablet 1    omeprazole (PriLOSEC) 20 mg delayed release capsule Take 1 capsule (20 mg total) by mouth as needed (GERD) 90 capsule 1    sotalol (BETAPACE) 120 mg tablet Take 1 tablet (120 mg total) by mouth 2 (two) times a day 180 tablet 1    ferrous sulfate 325 (65 Fe) mg tablet Take 1 tablet (325 mg total) by mouth daily with breakfast (Patient not taking: Reported on 1/7/2021) 30 tablet 0     No current facility-administered medications for this visit  Current Outpatient Medications on File Prior to Visit   Medication Sig    acetaminophen (TYLENOL) 325 mg tablet Acetaminophen 650 mg p o  q4H MILD pain    allopurinol (ZYLOPRIM) 100 mg tablet Take 100 mg by mouth daily    amLODIPine (NORVASC) 5 mg tablet Take 1 tablet (5 mg total) by mouth daily    aspirin 325 mg tablet Take 325 mg by mouth every other day as needed for mild pain    losartan (COZAAR) 100 MG tablet TAKE 1 TABLET BY MOUTH EVERY DAY    omeprazole (PriLOSEC) 20 mg delayed release capsule Take 1 capsule (20 mg total) by mouth as needed (GERD)    sotalol (BETAPACE) 120 mg tablet Take 1 tablet (120 mg total) by mouth 2 (two) times a day    ferrous sulfate 325 (65 Fe) mg tablet Take 1 tablet (325 mg total) by mouth daily with breakfast (Patient not taking: Reported on 1/7/2021)     No current facility-administered medications on file prior to visit  He has No Known Allergies       Review of Systems   Constitutional: Negative  HENT: Negative  Eyes: Negative  Respiratory: Negative  Cardiovascular: Negative  Gastrointestinal: Negative  Endocrine: Negative  Genitourinary: Negative  Musculoskeletal: Negative  Skin: Negative  Allergic/Immunologic: Negative  Neurological: Negative  Hematological: Negative  Psychiatric/Behavioral: Negative            Objective:      /80 (BP Location: Right arm, Patient Position: Sitting, Cuff Size: Adult)   Pulse 69   Temp (!) 97 2 °F (36 2 °C)   Resp 18   Ht 5' 10" (1 778 m)   Wt 59 9 kg (132 lb)   BMI 18 94 kg/m²          Physical Exam  Vitals signs reviewed  Constitutional:       Appearance: Normal appearance  HENT:      Head: Normocephalic and atraumatic  Nose: Nose normal       Mouth/Throat:      Mouth: Mucous membranes are moist    Eyes:      Pupils: Pupils are equal, round, and reactive to light  Neck:      Musculoskeletal: Normal range of motion  Cardiovascular:      Rate and Rhythm: Normal rate and regular rhythm  Pulses: Normal pulses  Heart sounds: Normal heart sounds  Pulmonary:      Effort: Pulmonary effort is normal       Breath sounds: Normal breath sounds  Abdominal:      General: Abdomen is flat  There is no distension  Palpations: Abdomen is soft  There is no mass  Tenderness: There is no abdominal tenderness  There is no guarding  Hernia: A hernia (Left-sided reducible inguinal hernia ) is present  Genitourinary:     Penis: Normal        Scrotum/Testes: Normal    Musculoskeletal: Normal range of motion  Skin:     General: Skin is warm  Neurological:      General: No focal deficit present  Mental Status: He is alert and oriented to person, place, and time     Psychiatric:         Mood and Affect: Mood normal          Behavior: Behavior normal

## 2021-01-13 ENCOUNTER — OFFICE VISIT (OUTPATIENT)
Dept: LAB | Facility: HOSPITAL | Age: 66
End: 2021-01-13
Attending: SURGERY
Payer: COMMERCIAL

## 2021-01-13 ENCOUNTER — APPOINTMENT (OUTPATIENT)
Dept: LAB | Facility: HOSPITAL | Age: 66
End: 2021-01-13
Attending: SURGERY
Payer: COMMERCIAL

## 2021-01-13 DIAGNOSIS — E87.1 HYPONATREMIA: ICD-10-CM

## 2021-01-13 DIAGNOSIS — E78.2 MIXED HYPERLIPIDEMIA: ICD-10-CM

## 2021-01-13 DIAGNOSIS — Z01.818 ENCOUNTER FOR PREADMISSION TESTING: ICD-10-CM

## 2021-01-13 DIAGNOSIS — Z12.5 SCREENING FOR PROSTATE CANCER: ICD-10-CM

## 2021-01-13 DIAGNOSIS — I10 BENIGN ESSENTIAL HTN: ICD-10-CM

## 2021-01-13 DIAGNOSIS — R53.82 CHRONIC FATIGUE: ICD-10-CM

## 2021-01-13 DIAGNOSIS — Z11.59 NEED FOR HEPATITIS C SCREENING TEST: ICD-10-CM

## 2021-01-13 LAB
25(OH)D3 SERPL-MCNC: 42.6 NG/ML (ref 30–100)
ALBUMIN SERPL BCP-MCNC: 4.1 G/DL (ref 3.4–4.8)
ALP SERPL-CCNC: 63.2 U/L (ref 10–129)
ALT SERPL W P-5'-P-CCNC: 14 U/L (ref 5–63)
ANION GAP SERPL CALCULATED.3IONS-SCNC: 7 MMOL/L (ref 4–13)
AST SERPL W P-5'-P-CCNC: 19 U/L (ref 15–41)
BILIRUB SERPL-MCNC: 1.21 MG/DL (ref 0.3–1.2)
BILIRUB UR QL STRIP: NEGATIVE
BILIRUB UR QL STRIP: NEGATIVE
BUN SERPL-MCNC: 9 MG/DL (ref 6–20)
CALCIUM SERPL-MCNC: 9 MG/DL (ref 8.4–10.2)
CHLORIDE SERPL-SCNC: 89 MMOL/L (ref 96–108)
CHOLEST SERPL-MCNC: 139 MG/DL
CLARITY UR: CLEAR
CLARITY UR: CLEAR
CO2 SERPL-SCNC: 26 MMOL/L (ref 22–33)
COLOR UR: YELLOW
COLOR UR: YELLOW
CREAT SERPL-MCNC: 0.81 MG/DL (ref 0.5–1.2)
ERYTHROCYTE [DISTWIDTH] IN BLOOD BY AUTOMATED COUNT: 12.5 % (ref 11.6–15.1)
GFR SERPL CREATININE-BSD FRML MDRD: 95 ML/MIN/1.73SQ M
GLUCOSE P FAST SERPL-MCNC: 105 MG/DL (ref 70–105)
GLUCOSE UR STRIP-MCNC: NEGATIVE MG/DL
GLUCOSE UR STRIP-MCNC: NEGATIVE MG/DL
HCT VFR BLD AUTO: 37 % (ref 36.5–49.3)
HCV AB SER QL: NORMAL
HDLC SERPL-MCNC: 65 MG/DL
HGB BLD-MCNC: 12.9 G/DL (ref 12–17)
HGB UR QL STRIP.AUTO: NEGATIVE
HGB UR QL STRIP.AUTO: NEGATIVE
KETONES UR STRIP-MCNC: NEGATIVE MG/DL
KETONES UR STRIP-MCNC: NEGATIVE MG/DL
LDLC SERPL CALC-MCNC: 62 MG/DL (ref 0–100)
LEUKOCYTE ESTERASE UR QL STRIP: NEGATIVE
LEUKOCYTE ESTERASE UR QL STRIP: NEGATIVE
MCH RBC QN AUTO: 32 PG (ref 26.8–34.3)
MCHC RBC AUTO-ENTMCNC: 34.9 G/DL (ref 31.4–37.4)
MCV RBC AUTO: 92 FL (ref 82–98)
NITRITE UR QL STRIP: NEGATIVE
NITRITE UR QL STRIP: NEGATIVE
NONHDLC SERPL-MCNC: 74 MG/DL
PH UR STRIP.AUTO: 7 [PH]
PH UR STRIP.AUTO: 7 [PH]
PLATELET # BLD AUTO: 176 THOUSANDS/UL (ref 149–390)
PMV BLD AUTO: 10.1 FL (ref 8.9–12.7)
POTASSIUM SERPL-SCNC: 4.4 MMOL/L (ref 3.5–5)
PROT SERPL-MCNC: 6.7 G/DL (ref 6.4–8.3)
PROT UR STRIP-MCNC: NEGATIVE MG/DL
PROT UR STRIP-MCNC: NEGATIVE MG/DL
PSA SERPL-MCNC: 3.2 NG/ML (ref 0–4)
RBC # BLD AUTO: 4.03 MILLION/UL (ref 3.88–5.62)
SODIUM SERPL-SCNC: 122 MMOL/L (ref 133–145)
SP GR UR STRIP.AUTO: 1.01 (ref 1–1.03)
SP GR UR STRIP.AUTO: 1.01 (ref 1–1.03)
TRIGL SERPL-MCNC: 57.9 MG/DL
TSH SERPL DL<=0.05 MIU/L-ACNC: 1.32 UIU/ML (ref 0.34–5.6)
UROBILINOGEN UR QL STRIP.AUTO: 0.2 E.U./DL
UROBILINOGEN UR QL STRIP.AUTO: 0.2 E.U./DL
WBC # BLD AUTO: 4.6 THOUSAND/UL (ref 4.31–10.16)

## 2021-01-13 PROCEDURE — G0103 PSA SCREENING: HCPCS

## 2021-01-13 PROCEDURE — 81003 URINALYSIS AUTO W/O SCOPE: CPT | Performed by: FAMILY MEDICINE

## 2021-01-13 PROCEDURE — 85027 COMPLETE CBC AUTOMATED: CPT

## 2021-01-13 PROCEDURE — 84443 ASSAY THYROID STIM HORMONE: CPT

## 2021-01-13 PROCEDURE — 82306 VITAMIN D 25 HYDROXY: CPT

## 2021-01-13 PROCEDURE — 80053 COMPREHEN METABOLIC PANEL: CPT

## 2021-01-13 PROCEDURE — 86803 HEPATITIS C AB TEST: CPT

## 2021-01-13 PROCEDURE — 36415 COLL VENOUS BLD VENIPUNCTURE: CPT

## 2021-01-13 PROCEDURE — 80061 LIPID PANEL: CPT

## 2021-01-14 ENCOUNTER — TELEPHONE (OUTPATIENT)
Dept: FAMILY MEDICINE CLINIC | Facility: CLINIC | Age: 66
End: 2021-01-14

## 2021-01-14 NOTE — TELEPHONE ENCOUNTER
Left message for patient to call back regarding lab results  They are as follows per Dr Mai Tan:    Salma Fears I am very pleased with his labs with the exception of serum  sodium which is quite low at 122   This was not the case 2 years ago  Jase Daigle if he would repeat that next week sometime--can put in [facility] in computer and he can go to [facility] 1 day next week

## 2021-01-15 ENCOUNTER — ANESTHESIA EVENT (OUTPATIENT)
Dept: PERIOP | Facility: HOSPITAL | Age: 66
End: 2021-01-15
Payer: COMMERCIAL

## 2021-01-15 DIAGNOSIS — E87.1 LOW SODIUM LEVELS: Primary | ICD-10-CM

## 2021-01-15 RX ORDER — HYDROCODONE BITARTRATE AND ACETAMINOPHEN 5; 325 MG/1; MG/1
0.5 TABLET ORAL EVERY 6 HOURS PRN
COMMUNITY
End: 2022-05-24

## 2021-01-15 NOTE — PRE-PROCEDURE INSTRUCTIONS
Pre-Surgery Instructions:   Medication Instructions    allopurinol (ZYLOPRIM) 100 mg tablet May take day of surgery with sip of H20    amLODIPine (NORVASC) 5 mg tablet May take day of surgery with sip of H20    aspirin 325 mg tablet HOLD 7 days prior to surgery    HYDROcodone-acetaminophen (NORCO) 5-325 mg per tablet Continue as prescribed -may take morning of surgery if needed with sip of H20    losartan (COZAAR) 100 MG tablet HOLD day of surgery    omeprazole (PriLOSEC) 20 mg delayed release capsule May take day of surgery with sip of H20    sotalol (BETAPACE) 120 mg tablet May take day of surgery with sip of H20  My Surgical Experience    The following information was developed to assist you to prepare for your operation  What do I need to do before coming to the hospital?   Arrange for a responsible person to drive you to and from the hospital    Arrange care for your children at home  Children are not allowed in the recovery areas of the hospital   Plan to wear clothing that is easy to put on and take off  If you are having shoulder surgery, wear a shirt that buttons or zippers in the front  Bathing  o Shower the evening before and the morning of your surgery with an antibacterial soap  Please refer to the Pre Op Showering Instructions for Surgery Patients Sheet   o Remove nail polish and all body piercing jewelry  o Do not shave any body part for at least 24 hours before surgery-this includes face, arms, legs and upper body  Food  o Nothing to eat or drink after midnight the night before your surgery   This includes candy and chewing gum  o Exception: If your surgery is after 12:00pm (noon), you may have clear liquids such as 7-Up®, ginger ale, apple or cranberry juice, Jell-O®, water, or clear broth until 8:00 am  o Do not drink milk or juice with pulp on the morning before surgery  o Do not drink alcohol 24 hours before surgery  Medicine  o Follow instructions you received from your surgeon about which medicines you may take on the day of surgery  o If instructed to take medicine on the morning of surgery, take pills with just a small sip of water  Call your prescribing doctor for specific infroamtion on what to do if you take insulin    What should I bring to the hospital?    Bring:  Arlana Nails or a walker, if you have them, for foot or knee surgery   A list of the daily medicines, vitamins, minerals, herbals and nutritional supplements you take  Include the dosages of medicines and the time you take them each day   Glasses, dentures or hearing aids   Minimal clothing; you will be wearing hospital sleepwear   Photo ID; required to verify your identity   If you have a Living Will or Power of , bring a copy of the documents   If you have an ostomy, bring an extra pouch and any supplies you use    Do not bring   Medicines or inhalers   Money, valuables or jewelry    What other information should I know about the day of surgery?  Notify your surgeons if you develop a cold, sore throat, cough, fever, rash or any other illness   Report to the Ambulatory Surgical/Same Day Surgery Unit   You will be instructed to stop at Registration only if you have not been pre-registered   Inform your  fi they do not stay that they will be asked by the staff to leave a phone number where they can be reached   Be available to be reached before surgery  In the event the operating room schedule changes, you may be asked to come in earlier or later than expected    *It is important to tell your doctor and others involved in your health care if you are taking or have been taking any non-prescription drugs, vitamins, minerals, herbals or other nutritional supplements  Any of these may interact with some food or medicines and cause a reaction    ACE/ARB Med Class    Continue this medication up to the evening before surgery/procedure, but do not take the morning of the day of surgery    Acetaminophen Med Class    Continue to take this medication on your normal schedule  If this is an oral medication and you take it in the morning, then you may take this medicine with a sip of water  Anti-gout Med Class    Continue to take this medication on your normal schedule  If this is an oral medication and you take it in the morning, then you may take this medicine with a sip of water  ASA Med Class: Aspirin    Should be discontinued at least one week prior to planned operation, unless specifically stated otherwise by surgical service  Your Surgeon may have patient stop taking aspirin up to a week before surgery if having intracranial, middle ear, posterior eye, spine surgery or prostate surgery  [Patients taking aspirin for coronary stents should be reviewed by an anesthesiologist in the optimization clinic  Please do not discontinue aspirin in patients with coronary stents unless given specific permission to do so by the cardiologist who prescribed medication ]   If your surgeon approves please continue to take this medication on your normal schedule  You may take this medication on the morning of your surgery with a sip of water  Beta blocker Med Class    Continue to take this heart medication on your normal schedule  If this is an oral medication and you take it in the morning, then you may take this medicine with a sip of water  Calcium Channel Blocker Med Class    Continue to take this heart medication on your normal schedule  If this is an oral medication and you take it in the morning, then you may take this medicine with a sip of water  Opioid Med Class    Continue to take this medication on your normal schedule  If this is an oral medication and you take it in the morning, then you may take this medicine with a sip of water  All medications and showering instructions reviewed with pt , COVID visitation policy reviewed with pt also  Pt instructed on location to report for surgery   Pt is aware to use Tylenol products 7 days prior to surgery, and to HOLD any supplements and vitamins 7 days prior to surgery  All questions answered at time of this call, pt verbalized understanding of all instructions and information

## 2021-01-18 ENCOUNTER — TELEPHONE (OUTPATIENT)
Dept: FAMILY MEDICINE CLINIC | Facility: CLINIC | Age: 66
End: 2021-01-18

## 2021-01-18 DIAGNOSIS — E87.1 LOW SODIUM LEVELS: Primary | ICD-10-CM

## 2021-01-18 NOTE — TELEPHONE ENCOUNTER
[facility] from dr paulson's office called ---the patient was scheduled for surgery on Thursday   Sodium levels are low   And they saw the message to dr Shazia Rocha but they could not find where the medication was called to the pharmacy  ----please call dr Jennifer Ibarra  He wants him cleared before surgery    Dr Jennifer Ibarra   [phone]

## 2021-01-20 NOTE — TELEPHONE ENCOUNTER
Pt called - relayed Dr Gabriela Griffin message to him  He will  the tabs today and recheck BMP Monday       *updated @9:58am  [facility] [phone] prefers pt to have an Rx for the NaCl be called in

## 2021-01-21 ENCOUNTER — ANESTHESIA (OUTPATIENT)
Dept: PERIOP | Facility: HOSPITAL | Age: 66
End: 2021-01-21
Payer: COMMERCIAL

## 2021-01-25 ENCOUNTER — LAB (OUTPATIENT)
Dept: LAB | Facility: HOSPITAL | Age: 66
End: 2021-01-25
Payer: COMMERCIAL

## 2021-01-25 ENCOUNTER — TELEPHONE (OUTPATIENT)
Dept: FAMILY MEDICINE CLINIC | Facility: CLINIC | Age: 66
End: 2021-01-25

## 2021-01-25 DIAGNOSIS — E87.1 LOW SODIUM LEVELS: ICD-10-CM

## 2021-01-25 DIAGNOSIS — E87.1 HYPONATREMIA: Primary | ICD-10-CM

## 2021-01-25 LAB
ANION GAP SERPL CALCULATED.3IONS-SCNC: 6 MMOL/L (ref 4–13)
BUN SERPL-MCNC: 10 MG/DL (ref 6–20)
CALCIUM SERPL-MCNC: 9.2 MG/DL (ref 8.4–10.2)
CHLORIDE SERPL-SCNC: 97 MMOL/L (ref 96–108)
CO2 SERPL-SCNC: 27 MMOL/L (ref 22–33)
CREAT SERPL-MCNC: 0.97 MG/DL (ref 0.5–1.2)
GFR SERPL CREATININE-BSD FRML MDRD: 83 ML/MIN/1.73SQ M
GLUCOSE P FAST SERPL-MCNC: 100 MG/DL (ref 70–105)
POTASSIUM SERPL-SCNC: 4.4 MMOL/L (ref 3.5–5)
SODIUM SERPL-SCNC: 130 MMOL/L (ref 133–145)

## 2021-01-25 PROCEDURE — 80048 BASIC METABOLIC PNL TOTAL CA: CPT

## 2021-01-25 PROCEDURE — 36415 COLL VENOUS BLD VENIPUNCTURE: CPT

## 2021-01-25 NOTE — TELEPHONE ENCOUNTER
Patient informed that recent labs show that sodium is improved  Patient is aware that order is in for electrolyte panel  He will get that done    He agrees to follow up in a month if still desired

## 2021-01-28 ENCOUNTER — HOSPITAL ENCOUNTER (OUTPATIENT)
Facility: HOSPITAL | Age: 66
Setting detail: OUTPATIENT SURGERY
Discharge: HOME | End: 2021-01-28
Attending: SURGERY | Admitting: SURGERY
Payer: COMMERCIAL

## 2021-01-28 VITALS — HEART RATE: 83 BPM

## 2021-01-28 VITALS
HEIGHT: 70 IN | TEMPERATURE: 97.2 F | WEIGHT: 144 LBS | HEART RATE: 72 BPM | DIASTOLIC BLOOD PRESSURE: 80 MMHG | BODY MASS INDEX: 20.62 KG/M2 | OXYGEN SATURATION: 100 % | RESPIRATION RATE: 16 BRPM | SYSTOLIC BLOOD PRESSURE: 182 MMHG

## 2021-01-28 DIAGNOSIS — K40.90 LEFT INGUINAL HERNIA: ICD-10-CM

## 2021-01-28 PROCEDURE — C1781 MESH (IMPLANTABLE): HCPCS | Performed by: SURGERY

## 2021-01-28 PROCEDURE — 88302 TISSUE EXAM BY PATHOLOGIST: CPT | Performed by: PATHOLOGY

## 2021-01-28 PROCEDURE — 49505 PRP I/HERN INIT REDUC >5 YR: CPT | Performed by: PHYSICIAN ASSISTANT

## 2021-01-28 PROCEDURE — 49505 PRP I/HERN INIT REDUC >5 YR: CPT | Performed by: SURGERY

## 2021-01-28 DEVICE — BARD MESH PERFIX PLUG, EXTRA LARGE
Type: IMPLANTABLE DEVICE | Site: INGUINAL | Status: FUNCTIONAL
Brand: BARD MESH PERFIX PLUG

## 2021-01-28 RX ORDER — NEOSTIGMINE METHYLSULFATE 1 MG/ML
INJECTION INTRAVENOUS AS NEEDED
Status: DISCONTINUED | OUTPATIENT
Start: 2021-01-28 | End: 2021-01-28

## 2021-01-28 RX ORDER — DEXAMETHASONE SODIUM PHOSPHATE 4 MG/ML
INJECTION, SOLUTION INTRA-ARTICULAR; INTRALESIONAL; INTRAMUSCULAR; INTRAVENOUS; SOFT TISSUE AS NEEDED
Status: DISCONTINUED | OUTPATIENT
Start: 2021-01-28 | End: 2021-01-28

## 2021-01-28 RX ORDER — FENTANYL CITRATE 50 UG/ML
INJECTION, SOLUTION INTRAMUSCULAR; INTRAVENOUS AS NEEDED
Status: DISCONTINUED | OUTPATIENT
Start: 2021-01-28 | End: 2021-01-28

## 2021-01-28 RX ORDER — MIDAZOLAM HYDROCHLORIDE 2 MG/2ML
INJECTION, SOLUTION INTRAMUSCULAR; INTRAVENOUS AS NEEDED
Status: DISCONTINUED | OUTPATIENT
Start: 2021-01-28 | End: 2021-01-28

## 2021-01-28 RX ORDER — ONDANSETRON 2 MG/ML
4 INJECTION INTRAMUSCULAR; INTRAVENOUS ONCE AS NEEDED
Status: DISCONTINUED | OUTPATIENT
Start: 2021-01-28 | End: 2021-01-28 | Stop reason: HOSPADM

## 2021-01-28 RX ORDER — CEFAZOLIN SODIUM 2 G/50ML
2000 SOLUTION INTRAVENOUS ONCE
Status: DISCONTINUED | OUTPATIENT
Start: 2021-01-28 | End: 2021-01-28 | Stop reason: HOSPADM

## 2021-01-28 RX ORDER — SODIUM CHLORIDE, SODIUM LACTATE, POTASSIUM CHLORIDE, CALCIUM CHLORIDE 600; 310; 30; 20 MG/100ML; MG/100ML; MG/100ML; MG/100ML
100 INJECTION, SOLUTION INTRAVENOUS CONTINUOUS
Status: DISCONTINUED | OUTPATIENT
Start: 2021-01-28 | End: 2021-01-28 | Stop reason: HOSPADM

## 2021-01-28 RX ORDER — BUPIVACAINE HYDROCHLORIDE AND EPINEPHRINE 2.5; 5 MG/ML; UG/ML
INJECTION, SOLUTION EPIDURAL; INFILTRATION; INTRACAUDAL; PERINEURAL AS NEEDED
Status: DISCONTINUED | OUTPATIENT
Start: 2021-01-28 | End: 2021-01-28 | Stop reason: HOSPADM

## 2021-01-28 RX ORDER — PROPOFOL 10 MG/ML
INJECTION, EMULSION INTRAVENOUS AS NEEDED
Status: DISCONTINUED | OUTPATIENT
Start: 2021-01-28 | End: 2021-01-28

## 2021-01-28 RX ORDER — EPHEDRINE SULFATE 50 MG/ML
INJECTION INTRAVENOUS AS NEEDED
Status: DISCONTINUED | OUTPATIENT
Start: 2021-01-28 | End: 2021-01-28

## 2021-01-28 RX ORDER — FENTANYL CITRATE/PF 50 MCG/ML
25 SYRINGE (ML) INJECTION
Status: DISCONTINUED | OUTPATIENT
Start: 2021-01-28 | End: 2021-01-28 | Stop reason: HOSPADM

## 2021-01-28 RX ORDER — GLYCOPYRROLATE 0.2 MG/ML
INJECTION INTRAMUSCULAR; INTRAVENOUS AS NEEDED
Status: DISCONTINUED | OUTPATIENT
Start: 2021-01-28 | End: 2021-01-28

## 2021-01-28 RX ORDER — CEFAZOLIN SODIUM 2 G/50ML
SOLUTION INTRAVENOUS AS NEEDED
Status: DISCONTINUED | OUTPATIENT
Start: 2021-01-28 | End: 2021-01-28

## 2021-01-28 RX ORDER — LIDOCAINE HYDROCHLORIDE 10 MG/ML
INJECTION, SOLUTION EPIDURAL; INFILTRATION; INTRACAUDAL; PERINEURAL AS NEEDED
Status: DISCONTINUED | OUTPATIENT
Start: 2021-01-28 | End: 2021-01-28

## 2021-01-28 RX ORDER — ROCURONIUM BROMIDE 10 MG/ML
INJECTION, SOLUTION INTRAVENOUS AS NEEDED
Status: DISCONTINUED | OUTPATIENT
Start: 2021-01-28 | End: 2021-01-28

## 2021-01-28 RX ORDER — ONDANSETRON 2 MG/ML
INJECTION INTRAMUSCULAR; INTRAVENOUS AS NEEDED
Status: DISCONTINUED | OUTPATIENT
Start: 2021-01-28 | End: 2021-01-28

## 2021-01-28 RX ORDER — MEPERIDINE HYDROCHLORIDE 25 MG/ML
12.5 INJECTION INTRAMUSCULAR; INTRAVENOUS; SUBCUTANEOUS
Status: DISCONTINUED | OUTPATIENT
Start: 2021-01-28 | End: 2021-01-28 | Stop reason: HOSPADM

## 2021-01-28 RX ORDER — SODIUM CHLORIDE, SODIUM LACTATE, POTASSIUM CHLORIDE, CALCIUM CHLORIDE 600; 310; 30; 20 MG/100ML; MG/100ML; MG/100ML; MG/100ML
INJECTION, SOLUTION INTRAVENOUS CONTINUOUS PRN
Status: DISCONTINUED | OUTPATIENT
Start: 2021-01-28 | End: 2021-01-28

## 2021-01-28 RX ORDER — HYDROMORPHONE HCL 110MG/55ML
0.4 PATIENT CONTROLLED ANALGESIA SYRINGE INTRAVENOUS
Status: DISCONTINUED | OUTPATIENT
Start: 2021-01-28 | End: 2021-01-28 | Stop reason: HOSPADM

## 2021-01-28 RX ADMIN — FENTANYL CITRATE 50 MCG: 50 INJECTION, SOLUTION INTRAMUSCULAR; INTRAVENOUS at 07:43

## 2021-01-28 RX ADMIN — ONDANSETRON 4 MG: 2 INJECTION INTRAMUSCULAR; INTRAVENOUS at 07:43

## 2021-01-28 RX ADMIN — EPHEDRINE SULFATE 5 MG: 50 INJECTION, SOLUTION INTRAVENOUS at 07:49

## 2021-01-28 RX ADMIN — FENTANYL CITRATE 50 MCG: 50 INJECTION, SOLUTION INTRAMUSCULAR; INTRAVENOUS at 08:00

## 2021-01-28 RX ADMIN — EPHEDRINE SULFATE 10 MG: 50 INJECTION, SOLUTION INTRAVENOUS at 08:22

## 2021-01-28 RX ADMIN — SODIUM CHLORIDE, SODIUM LACTATE, POTASSIUM CHLORIDE, AND CALCIUM CHLORIDE: .6; .31; .03; .02 INJECTION, SOLUTION INTRAVENOUS at 07:43

## 2021-01-28 RX ADMIN — LIDOCAINE HYDROCHLORIDE 50 MG: 10 INJECTION, SOLUTION EPIDURAL; INFILTRATION; INTRACAUDAL; PERINEURAL at 07:43

## 2021-01-28 RX ADMIN — DEXAMETHASONE SODIUM PHOSPHATE 4 MG: 4 INJECTION, SOLUTION INTRAMUSCULAR; INTRAVENOUS at 07:43

## 2021-01-28 RX ADMIN — CEFAZOLIN SODIUM 2000 MG: 2 SOLUTION INTRAVENOUS at 07:42

## 2021-01-28 RX ADMIN — GLYCOPYRROLATE 0.6 MG: 0.2 INJECTION, SOLUTION INTRAMUSCULAR; INTRAVENOUS at 08:43

## 2021-01-28 RX ADMIN — MIDAZOLAM HYDROCHLORIDE 2 MG: 1 INJECTION, SOLUTION INTRAMUSCULAR; INTRAVENOUS at 07:37

## 2021-01-28 RX ADMIN — ROCURONIUM BROMIDE 40 MG: 10 INJECTION, SOLUTION INTRAVENOUS at 07:43

## 2021-01-28 RX ADMIN — FENTANYL CITRATE 25 MCG: 0.05 INJECTION, SOLUTION INTRAMUSCULAR; INTRAVENOUS at 09:29

## 2021-01-28 RX ADMIN — PROPOFOL 200 MG: 10 INJECTION, EMULSION INTRAVENOUS at 07:43

## 2021-01-28 RX ADMIN — NEOSTIGMINE METHYLSULFATE 3 MG: 1 INJECTION, SOLUTION INTRAVENOUS at 08:43

## 2021-01-28 RX ADMIN — EPHEDRINE SULFATE 10 MG: 50 INJECTION, SOLUTION INTRAVENOUS at 07:53

## 2021-01-28 NOTE — OP NOTE
OPERATIVE REPORT  PATIENT NAME: Papito Lyman    :  1955  MRN: [id number]  Pt Location: [facility] OR ROOM 01    SURGERY DATE: 2021    Surgeon(s) and Role: Carlos Hudson MD - Primary     * Murray Chapa PA-C - Assisting    Preop Diagnosis:  Left inguinal hernia [K40 90]    Post-Op Diagnosis Codes:     * Left inguinal hernia [K40 90]    Procedure(s) (LRB):  REPAIR HERNIA INGUINAL with mesh (Left)    Specimen(s):  ID Type Source Tests Collected by Time Destination   1 : ileo inguinal nerve Tissue Inguinal region TISSUE EXAM Arnold Carvalho MD 2021 [id number]        Estimated Blood Loss:   10 mL    Drains:  * No LDAs found *    Anesthesia Type:   General    Operative Indications:  Left inguinal hernia [K40 90]      Operative Findings:  Left indirect inguinal hernia  Large sac  Complications:   None    Procedure and Technique:  The patient was brought to the operating room and was identified correctly by myself and the operating room staff  General anesthesia was provided  Parts were prepped and draped in the standard fashion  Time-out was performed  A left-sided groin crease incision was made about 5 cm  It was deepened through the skin and subcutaneous tissue using electrocautery  Kiki's fascia was opened using electrocautery  The external oblique aponeurosis was opened in the direction of the fibers using electrocautery  The cord structures along with the hernia sac was delivered and the Penrose was applied around it  Carefully using blunt and sharp dissection the cord structures was  from the hernia sac up to the pre peritoneum  The sac was reduced into the peritoneal cavity  An extra-large size Bard per fix plug was placed in the internal ring  A patch was then sutured to the pubic tubercle  It was placed on the floor of the inguinal canal   The 2 flaps for placed around the cord so that it was snug but not too tight    Four point fixation was done on the conjoint muscle, conjoint tendon, Candido a pubic tract  Irrigation was then performed  The external oblique aponeurosis was closed using 2-0 Vicryl in a running fashion  We also excise the a left-sided ilioinguinal nerve  The Kiki's fascia was closed in interrupted fashion using 3-0 Vicryl  Subcutaneous tissue was closed using 3-0 Vicryl  The skin was closed in a running fashion using 4-0 Monocryl  Steri-Strips with Mastisol was then applied  A sterile dressing was performed  Patient tolerated the procedure and was recovered from anesthesia and transferred to the recovery under stable condition     I was present for the entire procedure, A qualified resident physician was not available and A physician assistant was required during the procedure for retraction tissue handling,dissection and suturing    Patient Disposition:  PACU     SIGNATURE: Mary Back MD  DATE: January 28, 2021  TIME: 8:49 AM

## 2021-01-28 NOTE — DISCHARGE INSTR - AVS FIRST PAGE
Post-Operative Care Instructions      1  General: You may feel pulling sensations around the wound or funny aches and pains around the incisions  This is normal  Even minor surgery is a change in your body and this is your body's reaction to it  If you have had abdominal surgery, it may help to support the incision with a small pillow or blanket for comfort when moving or coughing  2  Wound care: You may remove the clear tape and white guaze 48 hours after surgery  Allow the strip bandages closest to the wound to stay in place and fall off by themselves  3  Showering: You may shower on 1/30 after you remove the wound bandage  Please do not soak wound in a bath, hot tub, pool, lake, etc  Do not scrub or use exfoliants on the surgical wounds  4  Activity: You may go up and down stairs, walk as much as you are comfortable, but walk at least 3 times each day  If you have had abdominal surgery, do not perform any strenuous exercise or lift anything heavier than 10-15 pounds for at least 3-4 weeks, unless cleared by your physician  5  Diet: You may resume your regular diet  Please drink lots of water  6  Medications: Resume all of your previous medications, unless told otherwise by the doctor  A good option for pain control is to start with acetaminophen(Tylenol) 500 to 650mg and ibuprofen(Advil) 400mg and alternate taking them every 3 hours  Start taking these around the clock as soon as you get home  If this is not sufficient then you make take the narcotic pain medicine as prescribed  You do not need to take the narcotic pain medication unless you are having significant pain and discomfort  Insure that you do not take more than 4000 mg of Tylenol per day  7  Driving: You will need someone to drive you home on the day of surgery  Do not drive or make any important decisions while on narcotic pain medication  Generally, you may drive 48 hours after you're off all narcotic pain medications      8  Upset Stomach: You may take Maalox, Tums, or similar items for an upset stomach  If your narcotic pain medication causes an upset stomach, do not take it on an empty stomach  Try taking it with at least some crackers or toast      9  Constipation: Patients often experience constipation after surgery  We recommend starting an over-the-counter medication for this, such as Metamucil, Senokot, Colace, milk of magnesia, etc  You may stop taking these medications a couple days after your last dose of narcotic medication  If you experience significant nausea or vomiting after abdominal surgery, call the office before trying any of these medications  10  Call the office: If you are experiencing any of the following: fevers above 101 5°, significant nausea or vomiting, if the wound develops drainage and/or excessive redness around the wound, or if you have significant diarrhea or other worsening symptoms  11  Pain: Use the norco you have at home only as needed for severe pain as prescribed

## 2021-01-28 NOTE — ANESTHESIA PREPROCEDURE EVALUATION
Procedure:  REPAIR HERNIA INGUINAL with mesh (Left Groin)    Relevant Problems   CARDIO   (+) Benign essential HTN   (+) Rib pain on left side      HEMATOLOGY   (+) Acute blood loss anemia      MUSCULOSKELETAL   (+) Discitis of lumbar region      NEURO/PSYCH   (+) Myelomalacia (HCC)   (+) Other spondylosis with myelopathy, cervical region        Physical Exam    Airway    Mallampati score: I  TM Distance: >3 FB  Neck ROM: full     Dental   No notable dental hx     Cardiovascular  Rhythm: regular, Rate: normal, Cardiovascular exam normal    Pulmonary      Other Findings        Anesthesia Plan  ASA Score- 3     Anesthesia Type- general with ASA Monitors  Additional Monitors:   Airway Plan: LMA  Plan Factors-Exercise tolerance (METS): <4 METS  Chart reviewed  EKG reviewed  Induction- intravenous  Postoperative Plan-     Informed Consent- Anesthetic plan and risks discussed with patient and spouse  I personally reviewed this patient with the CRNA  Discussed and agreed on the Anesthesia Plan with the CRNA  Eric Campuzano

## 2021-01-28 NOTE — INTERVAL H&P NOTE
H&P reviewed  After examining the patient I find no changes in the patients condition since the H&P had been written      Vitals:    01/28/21 0710   BP: 166/79   Pulse: 59   Resp: 18   Temp: 98 4 °F (36 9 °C)   SpO2: 100%

## 2021-01-28 NOTE — ANESTHESIA POSTPROCEDURE EVALUATION
Post-Op Assessment Note    CV Status:  Stable  Pain Score: 0    Pain management: adequate     Mental Status:  Alert and awake   Hydration Status:  Euvolemic   PONV Controlled:  Controlled   Airway Patency:  Patent      Post Op Vitals Reviewed: Yes      Staff: CRNA         No complications documented      BP   173/81   Temp   97 5   Pulse  73   Resp   14   SpO2   100% fm 4lpm

## 2021-02-10 ENCOUNTER — OFFICE VISIT (OUTPATIENT)
Dept: SURGERY | Facility: CLINIC | Age: 66
End: 2021-02-10

## 2021-02-10 VITALS
SYSTOLIC BLOOD PRESSURE: 130 MMHG | HEART RATE: 50 BPM | RESPIRATION RATE: 18 BRPM | BODY MASS INDEX: 18.04 KG/M2 | WEIGHT: 126 LBS | HEIGHT: 70 IN | DIASTOLIC BLOOD PRESSURE: 78 MMHG | TEMPERATURE: 97.6 F

## 2021-02-10 DIAGNOSIS — K40.90 INGUINAL HERNIA OF LEFT SIDE WITHOUT OBSTRUCTION OR GANGRENE: Primary | ICD-10-CM

## 2021-02-10 PROCEDURE — 99024 POSTOP FOLLOW-UP VISIT: CPT | Performed by: SURGERY

## 2021-02-10 NOTE — PROGRESS NOTES
Assessment/Plan:    He is doing well  I told him to not lift heavy objects for another 2 weeks  He is going to see me in 2 weeks for clearance to work  No problem-specific Assessment & Plan notes found for this encounter  Diagnoses and all orders for this visit:    Inguinal hernia of left side without obstruction or gangrene          Subjective:      Patient ID: Gerardo Espinosa is a 63 y o  male  51-year-old male patient who is 2 weeks status post left inguinal hernia repair  He says he is doing well  Complains of minimum pain  No nausea or vomiting  He is tolerating diet and having regular bowel movements  The following portions of the patient's history were reviewed and updated as appropriate: allergies, current medications, past family history, past medical history, past social history and problem list     Review of Systems   All other systems reviewed and are negative  Objective:      /78 (BP Location: Right arm, Patient Position: Sitting, Cuff Size: Adult)   Pulse (!) 50   Temp 97 6 °F (36 4 °C)   Resp 18   Ht 5' 10" (1 778 m)   Wt 57 2 kg (126 lb)   BMI 18 08 kg/m²          Physical Exam  Vitals signs reviewed  Constitutional:       Appearance: Normal appearance  HENT:      Head: Normocephalic and atraumatic  Abdominal:      General: Abdomen is flat  Bowel sounds are normal       Palpations: Abdomen is soft  Comments: The incision looked healthy  I removed the Steri-Strips  There was no cough impulse  Genitourinary:     Penis: Normal        Scrotum/Testes: Normal    Neurological:      Mental Status: He is alert

## 2021-02-23 ENCOUNTER — OFFICE VISIT (OUTPATIENT)
Dept: SURGERY | Facility: CLINIC | Age: 66
End: 2021-02-23

## 2021-02-23 VITALS
WEIGHT: 129 LBS | DIASTOLIC BLOOD PRESSURE: 86 MMHG | SYSTOLIC BLOOD PRESSURE: 136 MMHG | RESPIRATION RATE: 18 BRPM | BODY MASS INDEX: 18.47 KG/M2 | HEIGHT: 70 IN | HEART RATE: 70 BPM | TEMPERATURE: 97 F

## 2021-02-23 DIAGNOSIS — Z48.89 POSTOPERATIVE VISIT: Primary | ICD-10-CM

## 2021-02-23 PROCEDURE — 99024 POSTOP FOLLOW-UP VISIT: CPT | Performed by: SURGERY

## 2021-02-23 NOTE — LETTER
February 23, 2021     Patient: Ellie Valle   YOB: 1955   Date of Visit: 2/23/2021       To Whom it May Concern:    Ellie Valle is under my professional care  He was seen in my office on 2/23/2021  He may return to work on 02/24/2021  If you have any questions or concerns, please don't hesitate to call           Sincerely,          Rosie Elias MD        CC: Ellie Valle

## 2021-02-23 NOTE — PROGRESS NOTES
Assessment/Plan:  He is doing very well  I told him that he can go back to work without any restrictions  A letter in that regard was provided  No problem-specific Assessment & Plan notes found for this encounter  Diagnoses and all orders for this visit:    Postoperative visit          Subjective:      Patient ID: Gerardo Espinosa is a 63 y o  male  61-year-old male patient who is 4 weeks status post repair of a large left inguinal hernia with mesh  Doing well  He does complain of some discomfort however there is no fever, increase in swelling  He is tolerating diet  The following portions of the patient's history were reviewed and updated as appropriate: allergies, current medications, past family history, past medical history, past surgical history and problem list     Review of Systems   All other systems reviewed and are negative  Objective:      /86 (BP Location: Left arm, Patient Position: Sitting, Cuff Size: Adult)   Pulse 70   Temp (!) 97 °F (36 1 °C)   Resp 18   Ht 5' 10" (1 778 m)   Wt 58 5 kg (129 lb)   BMI 18 51 kg/m²          Physical Exam  Abdominal:      Comments: Incision is completely healed  There is no tenderness  No cough impulse  No redness of the overlying skin  There is no swelling     Genitourinary:     Penis: Normal        Scrotum/Testes: Normal

## 2021-02-24 ENCOUNTER — LAB (OUTPATIENT)
Dept: LAB | Facility: HOSPITAL | Age: 66
End: 2021-02-24
Payer: COMMERCIAL

## 2021-02-24 DIAGNOSIS — E87.1 HYPONATREMIA: ICD-10-CM

## 2021-02-24 LAB
ANION GAP SERPL CALCULATED.3IONS-SCNC: 5 MMOL/L (ref 4–13)
CHLORIDE SERPL-SCNC: 94 MMOL/L (ref 96–108)
CO2 SERPL-SCNC: 27 MMOL/L (ref 22–33)
POTASSIUM SERPL-SCNC: 4.8 MMOL/L (ref 3.5–5)
SODIUM SERPL-SCNC: 126 MMOL/L (ref 133–145)

## 2021-02-24 PROCEDURE — 80051 ELECTROLYTE PANEL: CPT

## 2021-02-24 PROCEDURE — 36415 COLL VENOUS BLD VENIPUNCTURE: CPT

## 2021-02-25 ENCOUNTER — TELEPHONE (OUTPATIENT)
Dept: FAMILY MEDICINE CLINIC | Facility: CLINIC | Age: 66
End: 2021-02-25

## 2021-02-25 NOTE — TELEPHONE ENCOUNTER
Patient informed of low Na level  Transferred to front to make quick 30 min appointment with Dr Reena Be

## 2021-03-16 ENCOUNTER — OFFICE VISIT (OUTPATIENT)
Dept: FAMILY MEDICINE CLINIC | Facility: CLINIC | Age: 66
End: 2021-03-16
Payer: COMMERCIAL

## 2021-03-16 VITALS
HEART RATE: 57 BPM | BODY MASS INDEX: 18.61 KG/M2 | TEMPERATURE: 98.5 F | HEIGHT: 70 IN | OXYGEN SATURATION: 98 % | SYSTOLIC BLOOD PRESSURE: 132 MMHG | RESPIRATION RATE: 18 BRPM | WEIGHT: 130 LBS | DIASTOLIC BLOOD PRESSURE: 86 MMHG

## 2021-03-16 DIAGNOSIS — I48.91 ATRIAL FIBRILLATION, UNSPECIFIED TYPE (HCC): ICD-10-CM

## 2021-03-16 DIAGNOSIS — Z98.890 HISTORY OF CERVICAL SPINAL SURGERY: ICD-10-CM

## 2021-03-16 DIAGNOSIS — R63.8 INCREASED BMI: Primary | ICD-10-CM

## 2021-03-16 DIAGNOSIS — Z00.00 WELLNESS EXAMINATION: ICD-10-CM

## 2021-03-16 PROCEDURE — 3725F SCREEN DEPRESSION PERFORMED: CPT | Performed by: FAMILY MEDICINE

## 2021-03-16 PROCEDURE — 99396 PREV VISIT EST AGE 40-64: CPT | Performed by: FAMILY MEDICINE

## 2021-03-16 PROCEDURE — 3008F BODY MASS INDEX DOCD: CPT | Performed by: FAMILY MEDICINE

## 2021-03-16 PROCEDURE — 1036F TOBACCO NON-USER: CPT | Performed by: FAMILY MEDICINE

## 2021-03-16 NOTE — PROGRESS NOTES
Assessment/Plan:  63 y o male, well-known to me for about one yr  presents for f/u and evaluation of the following medical issues:     Neuropathy in the R is > L, and pt is R handed   A Fib - on Sotalol and ASA , not other anti-coag - Dr Lucina Samuel    F/u and eval HTN - controlled on Norvasc 10 mg    Discussed Covid-19 vaccine    F/u and eval GERD - on PPI q OTHER day        Problem List Items Addressed This Visit        Cardiovascular and Mediastinum    Atrial fibrillation ([facility] [address] )       Other    History of cervical spinal surgery     Pt knows he's limitied in phys activities  Imbalance does persist   Uses cane prn           Other Visit Diagnoses     Increased BMI    -  Primary    Wellness examination                Subjective:      Patient ID: Brandee Tse is a 63 y o  male  HPI--65 y o male, well-known to me for about one yr  presents for f/u and evaluation of the following medical issues:     Neuropathy in the R is > L, and pt is R handed       A Fib - on Sotalol and ASA , not other anti-coag - Dr Lucina Samuel    F/u and eval HTN - controlled on Norvasc 10 mg    Discussed Covid-19 vaccine    F/u and eval GERD - on PPI q OTHER day     The following portions of the patient's history were reviewed and updated as appropriate:   Past Medical History:  He has a past medical history of A-fib ([facility] [address] ) (01/01/2005), 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 (03/10/2017), Anemia (12/31/2015), Anxiety, Back ache (08/31/2017), Diarrhea (08/31/2017), Diverticula of colon (01/01/2012), Family history of prostate cancer, GERD (gastroesophageal reflux disease), GERD with esophagitis (08/31/2017), Gout (08/31/2017), Hordeolum externum of lower eyelid (08/31/2017), Hyperlipidemia (05/01/2017), Hypertension, essential (05/01/2017), Hyponatremia (07/10/2014), Loss of taste (05/01/2017), Muscle strain (08/31/2017), Osteoarthritis, Paresthesia (06/08/2018), Raised prostate specific antigen (05/01/2017), Skin lesion (03/08/2018), Spinal stenosis of lumbar region (02/06/2017), Steatosis of liver (08/31/2017), and Weight loss, unintentional (08/31/2017)  ,  _______________________________________________________________________  Medical Problems:  does not have any pertinent problems on file ,  _______________________________________________________________________  Past Surgical History:   has a past surgical history that includes Other surgical history (03/10/2017); Chest tube insertion (1980); pr arthrodesis posterior/posteriorlateral cervical below c2 (N/A, 8/16/2018); Colonoscopy (11/2018); Back surgery; Cervical spine surgery (08/2018); and pr repair ing hernia,5+y/o,reducibl (Left, 1/28/2021)  ,  _______________________________________________________________________  Family History:  family history includes Kidney disease in his father; Pancreatic cancer in his mother; Prostate cancer in his father ,  _______________________________________________________________________  Social History:   reports that he quit smoking about 41 years ago  His smoking use included cigarettes  He has never used smokeless tobacco  He reports current alcohol use of about 5 0 standard drinks of alcohol per week  He reports that he does not use drugs  ,  _______________________________________________________________________  Allergies:  has No Known Allergies     _______________________________________________________________________  Current Outpatient Medications   Medication Sig Dispense Refill    allopurinol (ZYLOPRIM) 100 mg tablet Take 100 mg by mouth every other day       amLODIPine (NORVASC) 5 mg tablet Take 1 tablet (5 mg total) by mouth daily 90 tablet 3    aspirin 325 mg tablet Take 325 mg by mouth every other day as needed for mild pain      HYDROcodone-acetaminophen (NORCO) 5-325 mg per tablet Take 0 5 tablets by mouth every 6 (six) hours as needed for pain      losartan (COZAAR) 100 MG tablet TAKE 1 TABLET BY MOUTH EVERY DAY 90 tablet 1    sotalol (BETAPACE) 120 mg tablet Take 1 tablet (120 mg total) by mouth 2 (two) times a day 180 tablet 1    omeprazole (PriLOSEC) 20 mg delayed release capsule Take 1 capsule (20 mg total) by mouth as needed (GERD) 90 capsule 1     No current facility-administered medications for this visit       _______________________________________________________________________  Review of Systems   Constitutional: Negative for activity change, appetite change, chills, diaphoresis, fatigue, fever and unexpected weight change  HENT: Negative for congestion, dental problem, drooling, ear discharge, ear pain, facial swelling, mouth sores, nosebleeds, postnasal drip, rhinorrhea, trouble swallowing and voice change  Eyes: Negative for photophobia, pain, discharge, redness, itching and visual disturbance  Respiratory: Negative for apnea, cough, choking, chest tightness and shortness of breath  Denies any and all respiratory issues - SOB, orthopnea, etc    Cardiovascular: Negative for chest pain and leg swelling  Denies any and all cardiac symptoms   Gastrointestinal: Negative for abdominal distention, abdominal pain, constipation, diarrhea and nausea  Endocrine: Negative for polydipsia, polyphagia and polyuria  Genitourinary: Negative for decreased urine volume, difficulty urinating, dysuria, enuresis and hematuria  Musculoskeletal: Negative for arthralgias, back pain, gait problem and joint swelling  Skin: Negative for color change, pallor, rash and wound  Allergic/Immunologic: Negative for immunocompromised state  Neurological: Negative for dizziness, seizures, syncope, facial asymmetry, speech difficulty, light-headedness and headaches  Hematological: Negative for adenopathy  Psychiatric/Behavioral: Negative for agitation, behavioral problems, confusion and decreased concentration           Objective:  Vitals:    03/16/21 0909   BP: 132/86   Pulse: 57   Resp: 18   Temp: 98 5 °F (36 9 °C)   SpO2: 98% Weight: 59 kg (130 lb)   Height: 5' 10" (1 778 m)     Body mass index is 18 65 kg/m²  Physical Exam  Vitals signs and nursing note reviewed  Constitutional:       General: He is not in acute distress  Appearance: Normal appearance  He is well-developed and normal weight  He is not ill-appearing or diaphoretic  HENT:      Head: Normocephalic and atraumatic  Nose: Nose normal    Eyes:      Pupils: Pupils are equal, round, and reactive to light  Neck:      Musculoskeletal: Normal range of motion and neck supple  No neck rigidity or muscular tenderness  Trachea: No tracheal deviation  Cardiovascular:      Rate and Rhythm: Normal rate and regular rhythm  Heart sounds: Normal heart sounds  Pulmonary:      Effort: Pulmonary effort is normal  No respiratory distress  Breath sounds: Normal breath sounds  No wheezing or rhonchi  Abdominal:      General: Bowel sounds are normal       Palpations: Abdomen is soft  Hernia: A hernia (had repaired on Jan 28, 2021- Dr Rebeca Reina  ) is present  Musculoskeletal: Normal range of motion  Lymphadenopathy:      Cervical: No cervical adenopathy  Skin:     General: Skin is warm and dry  Coloration: Skin is not jaundiced or pale  Findings: No erythema or rash  Neurological:      General: No focal deficit present  Mental Status: He is alert and oriented to person, place, and time  Psychiatric:         Mood and Affect: Mood normal          Behavior: Behavior normal          Thought Content:  Thought content normal          Judgment: Judgment normal

## 2021-03-16 NOTE — PATIENT INSTRUCTIONS
A-fib (Atrial Fibrillation)   WHAT YOU NEED TO KNOW:   What is atrial fibrillation (a-fib)? A-fib is an irregular heartbeat  It reduces your heart's ability to pump blood through your body  A-fib may come and go, or it may be a long-term condition  A-fib can cause life-threatening blood clots, stroke, or heart failure  It is important to treat and manage a-fib to help prevent these problems  What increases my risk for a-fib? · High blood pressure, heart failure, or heart valve disease    · Smoking    · COPD, sleep apnea, a blood clot in your lung, or other lung diseases     · Diabetes, obesity, or thyroid disease    · Heavy alcohol use or large amounts of caffeine    · Age 72 years or older     · A family history of a-fib or other heart problems    What are the signs and symptoms of a-fib? · A heartbeat that races, pounds, or flutters    · Weakness, severe tiredness, or confusion    · Feeling lightheaded, sweaty, dizzy, or faint    · Shortness of breath or anxiety    · Chest pain or pressure    How is a-fib diagnosed? Your healthcare provider will examine you  Tell the provider about your symptoms, health conditions, and medicines  Tell the provider if you drink alcohol, smoke cigarettes, or use any illegal drugs  You will need an EKG to check your heart rhythm and how fast your heart beats  You may also need to wear a Holter monitor at home while you do your usual activities  The Holter monitor is a portable EKG machine  How is a-fib treated? Conditions that cause a-fib, such as thyroid disease, will be treated  You may also need any of the following:  · Heart medicines  help control your heart rate or rhythm  You may need more than one medicine to treat your symptoms  · Antiplatelet or blood thinner medicines  help prevent blood clots and stroke  · Cardioversion  is a procedure to return your heart rate and rhythm to normal  It can be done using medicines or electric shock       · A-fib ablation  is a procedure that uses energy to burn a small area of heart tissue  This creates scar tissue and prevents electrical signals that cause a-fib  You may need this procedure more than once  Ask for more information on a-fib ablation  · A pacemaker  may be inserted into your heart  A pacemaker is a device that controls your heartbeat  A pacemaker may be inserted during an ablation procedure or surgery  Ask your healthcare provider for more information on pacemakers  · Surgery  may be needed if other procedures do not work  During surgery your healthcare provider will make cuts in the upper part of your heart  The provider will stitch the cuts together to create scar tissue  The scar tissue will prevent electrical signals that cause a-fib  How can I manage a-fib? · Know your target heart rate  Learn how to check your pulse and monitor your heart rate  · Know the risks if you choose to drink alcohol  Alcohol can make a-fib hard to manage  Ask your healthcare provider if it is safe for you to drink alcohol  A drink of alcohol is 12 ounces of beer, 5 ounces of wine, or 1½ ounces of liquor  · Do not smoke  Nicotine can cause heart damage and make it more difficult to manage your a-fib  Do not use e-cigarettes or smokeless tobacco in place of cigarettes or to help you quit  They still contain nicotine  Ask your healthcare provider for information if you currently smoke and need help quitting  · Eat heart-healthy foods  Heart healthy foods will help keep your cholesterol low  These include fruits, vegetables, whole-grain breads, low-fat dairy products, beans, lean meats, and fish  Replace butter and margarine with heart-healthy oils such as olive oil and canola oil  · Maintain a healthy weight  Ask your healthcare provider how much you should weigh  Ask him or her to help you create a safe weight loss plan if you are overweight   Even a small goal of a 10% weight loss can improve your heart health  · Get regular physical activity  Physical activity helps improve your heart health  Get at least 150 minutes of moderate aerobic physical activity each week  Your healthcare provider can help you create an activity plan  · Manage other health conditions  This includes high blood pressure or cholesterol, sleep apnea, diabetes, and other heart conditions  Take medicine as directed and follow your treatment plan  Call your local emergency number [phone] in the [facility]) if:   · You have any of the following signs of a heart attack:      ? Squeezing, pressure, or pain in your chest    ? You may  also have any of the following:     ? Discomfort or pain in your back, neck, jaw, stomach, or arm    ? Shortness of breath    ? Nausea or vomiting    ? Lightheadedness or a sudden cold sweat    · You have any of the following signs of a stroke:      ? Numbness or drooping on one side of your face     ? Weakness in an arm or leg    ? Confusion or difficulty speaking    ? Dizziness, a severe headache, or vision loss    When should I call my cardiologist?   · Your arm or leg feels warm, tender, and painful  It may look swollen and red  · Your heart rate is more than 110 beats per minute  · You have new or worsening swelling in your legs, feet, ankles, or abdomen  · You are short of breath, even at rest      · You have questions or concerns about your condition or care  CARE AGREEMENT:   You have the right to help plan your care  Learn about your health condition and how it may be treated  Discuss treatment options with your healthcare providers to decide what care you want to receive  You always have the right to refuse treatment  The above information is an  only  It is not intended as medical advice for individual conditions or treatments  Talk to your doctor, nurse or pharmacist before following any medical regimen to see if it is safe and effective for you    © Copyright [employer] [address] [place] [address] Information is for [employer] use only and may not be sold, redistributed or otherwise used for commercial purposes   All illustrations and images included in CareNotes® are the copyrighted property of [employer] M [employer]  or [address] [place], [place] [address]susan joseph

## 2021-03-17 DIAGNOSIS — K21.9 GASTROESOPHAGEAL REFLUX DISEASE WITHOUT ESOPHAGITIS: ICD-10-CM

## 2021-03-19 RX ORDER — OMEPRAZOLE 20 MG/1
20 CAPSULE, DELAYED RELEASE ORAL AS NEEDED
Qty: 90 CAPSULE | Refills: 1 | Status: SHIPPED | OUTPATIENT
Start: 2021-03-19 | End: 2021-09-17 | Stop reason: SDUPTHER

## 2021-03-31 DIAGNOSIS — M10.00 IDIOPATHIC GOUT, UNSPECIFIED CHRONICITY, UNSPECIFIED SITE: Primary | ICD-10-CM

## 2021-03-31 RX ORDER — ALLOPURINOL 100 MG/1
TABLET ORAL
Qty: 90 TABLET | Refills: 3 | Status: SHIPPED | OUTPATIENT
Start: 2021-03-31 | End: 2022-04-06

## 2021-05-16 DIAGNOSIS — I48.91 ATRIAL FIBRILLATION, UNSPECIFIED TYPE (HCC): ICD-10-CM

## 2021-05-16 DIAGNOSIS — I10 BENIGN ESSENTIAL HTN: ICD-10-CM

## 2021-05-16 RX ORDER — SOTALOL HYDROCHLORIDE 120 MG/1
TABLET ORAL
Qty: 180 TABLET | Refills: 1 | Status: SHIPPED | OUTPATIENT
Start: 2021-05-16 | End: 2021-11-11 | Stop reason: SDUPTHER

## 2021-05-17 RX ORDER — LOSARTAN POTASSIUM 100 MG/1
TABLET ORAL
Qty: 90 TABLET | Refills: 1 | Status: SHIPPED | OUTPATIENT
Start: 2021-05-17 | End: 2021-11-11 | Stop reason: SDUPTHER

## 2021-09-17 DIAGNOSIS — K21.9 GASTROESOPHAGEAL REFLUX DISEASE WITHOUT ESOPHAGITIS: ICD-10-CM

## 2021-09-17 RX ORDER — OMEPRAZOLE 20 MG/1
20 CAPSULE, DELAYED RELEASE ORAL AS NEEDED
Qty: 90 CAPSULE | Refills: 1 | Status: SHIPPED | OUTPATIENT
Start: 2021-09-17 | End: 2022-03-24

## 2021-09-17 NOTE — TELEPHONE ENCOUNTER
Lvmsg for patient to r/s appt for follow up .   Pharmacy requests medication refill on behalf of patient   Omeprazole DR 20 mg  Medication pended for your approval

## 2021-11-11 ENCOUNTER — TELEPHONE (OUTPATIENT)
Dept: FAMILY MEDICINE CLINIC | Facility: CLINIC | Age: 66
End: 2021-11-11

## 2021-11-11 DIAGNOSIS — I10 BENIGN ESSENTIAL HTN: ICD-10-CM

## 2021-11-11 RX ORDER — LOSARTAN POTASSIUM 100 MG/1
100 TABLET ORAL DAILY
Qty: 90 TABLET | Refills: 1 | Status: SHIPPED | OUTPATIENT
Start: 2021-11-11 | End: 2021-11-11 | Stop reason: SDUPTHER

## 2021-11-15 ENCOUNTER — OFFICE VISIT (OUTPATIENT)
Dept: FAMILY MEDICINE CLINIC | Facility: CLINIC | Age: 66
End: 2021-11-15
Payer: COMMERCIAL

## 2021-11-15 VITALS
OXYGEN SATURATION: 98 % | WEIGHT: 130 LBS | SYSTOLIC BLOOD PRESSURE: 122 MMHG | RESPIRATION RATE: 17 BRPM | BODY MASS INDEX: 18.61 KG/M2 | HEART RATE: 52 BPM | HEIGHT: 70 IN | DIASTOLIC BLOOD PRESSURE: 64 MMHG | TEMPERATURE: 98.2 F

## 2021-11-15 DIAGNOSIS — E87.1 HYPONATREMIA: ICD-10-CM

## 2021-11-15 DIAGNOSIS — Z76.0 MEDICATION REFILL: Primary | ICD-10-CM

## 2021-11-15 DIAGNOSIS — I48.91 ATRIAL FIBRILLATION, UNSPECIFIED TYPE (HCC): ICD-10-CM

## 2021-11-15 DIAGNOSIS — I10 BENIGN ESSENTIAL HTN: ICD-10-CM

## 2021-11-15 DIAGNOSIS — K21.9 GASTROESOPHAGEAL REFLUX DISEASE WITHOUT ESOPHAGITIS: ICD-10-CM

## 2021-11-15 DIAGNOSIS — Z23 ENCOUNTER FOR IMMUNIZATION: ICD-10-CM

## 2021-11-15 DIAGNOSIS — M10.00 IDIOPATHIC GOUT, UNSPECIFIED CHRONICITY, UNSPECIFIED SITE: ICD-10-CM

## 2021-11-15 PROCEDURE — 3008F BODY MASS INDEX DOCD: CPT | Performed by: NURSE PRACTITIONER

## 2021-11-15 PROCEDURE — 1036F TOBACCO NON-USER: CPT | Performed by: NURSE PRACTITIONER

## 2021-11-15 PROCEDURE — 3074F SYST BP LT 130 MM HG: CPT | Performed by: NURSE PRACTITIONER

## 2021-11-15 PROCEDURE — 3078F DIAST BP <80 MM HG: CPT | Performed by: NURSE PRACTITIONER

## 2021-11-15 PROCEDURE — 99214 OFFICE O/P EST MOD 30 MIN: CPT | Performed by: NURSE PRACTITIONER

## 2021-11-15 PROCEDURE — 3725F SCREEN DEPRESSION PERFORMED: CPT | Performed by: NURSE PRACTITIONER

## 2021-11-15 RX ORDER — SOTALOL HYDROCHLORIDE 120 MG/1
120 TABLET ORAL 2 TIMES DAILY
Qty: 180 TABLET | Refills: 3 | Status: SHIPPED | OUTPATIENT
Start: 2021-11-15

## 2021-11-15 RX ORDER — AMLODIPINE BESYLATE 5 MG/1
5 TABLET ORAL DAILY
Qty: 90 TABLET | Refills: 3 | Status: SHIPPED | OUTPATIENT
Start: 2021-11-15 | End: 2022-08-10

## 2021-11-15 RX ORDER — LOSARTAN POTASSIUM 100 MG/1
100 TABLET ORAL DAILY
Qty: 90 TABLET | Refills: 3 | Status: SHIPPED | OUTPATIENT
Start: 2021-11-15

## 2022-02-08 ENCOUNTER — TELEPHONE (OUTPATIENT)
Dept: FAMILY MEDICINE CLINIC | Facility: CLINIC | Age: 67
End: 2022-02-08

## 2022-02-08 NOTE — TELEPHONE ENCOUNTER
Patient would like a referral or recommendation for an orthopedic specialist for his lower back -     Patient had L5-S1 abscess removal with Dr Donald Ta in 2017 @ [facility] , now having reoccurring symptoms and would prefer to see someone at [facility]  He did have Cspine surgery in 2018 with Neurosurgery here at [facility] - I suggested he may want to go back to Neurosurgery but I wanted to run it past you to get your opinion  Please let me know

## 2022-02-14 NOTE — TELEPHONE ENCOUNTER
N- the way it should work, I think, is for me to see him and evaluate just his low back issues  Then I can go ahead and order a CT scan or MRI, depending upon whether he has pacemaker etc   After that imaging, then would likely refer back to our neuro surgery team   That would make the most sense

## 2022-02-14 NOTE — TELEPHONE ENCOUNTER
Patient called back he is looking for a referral for a [facility] orthopedic  doctor  For a second opinion  Call patient with the information thank you

## 2022-02-17 ENCOUNTER — OFFICE VISIT (OUTPATIENT)
Dept: FAMILY MEDICINE CLINIC | Facility: CLINIC | Age: 67
End: 2022-02-17
Payer: MEDICARE

## 2022-02-17 VITALS
WEIGHT: 124 LBS | RESPIRATION RATE: 18 BRPM | BODY MASS INDEX: 17.75 KG/M2 | SYSTOLIC BLOOD PRESSURE: 128 MMHG | TEMPERATURE: 97.9 F | HEIGHT: 70 IN | DIASTOLIC BLOOD PRESSURE: 78 MMHG | HEART RATE: 61 BPM | OXYGEN SATURATION: 98 %

## 2022-02-17 DIAGNOSIS — M79.604 LUMBAR PAIN WITH RADIATION DOWN RIGHT LEG: Primary | ICD-10-CM

## 2022-02-17 DIAGNOSIS — M54.50 LUMBAR PAIN WITH RADIATION DOWN RIGHT LEG: Primary | ICD-10-CM

## 2022-02-17 PROCEDURE — 99214 OFFICE O/P EST MOD 30 MIN: CPT | Performed by: FAMILY MEDICINE

## 2022-02-17 RX ORDER — IBUPROFEN 600 MG/1
600 TABLET ORAL EVERY 6 HOURS PRN
Qty: 30 TABLET | Refills: 2 | Status: SHIPPED | OUTPATIENT
Start: 2022-02-17

## 2022-02-17 NOTE — PROGRESS NOTES
Assessment/Plan: 61 y o male with CC of ac pain in the R butt cheek only, the L side not affected at all  And when walking, RLE seems to "go down"  In 2017, had the exact same thing  Had emergency surg ry Dr Angeles Blackwood in March 2017 for Iand   D of l9mbar epidural abcess and laminectomy  Was good for 7-8 mo, then the pain came back "little by little" and now here  Now, he is concerned that same thing is occurring  Sharon since the last time, then it started in Nov and took to March to get the MRI and surg  No fever, nite sweats, HA, etc  Thinks he may want to see Dr Candida Armenta  Or Dr Sada Mendoza in the [facility]  Let's get X-Ray of   BMI Counseling: Body mass index is 17 79 kg/m²  The BMI is below normal  Patient referred to nutritionist and PCP  Rationale for BMI follow-up plan is due to patient being underweight  Depression Screening and Follow-up Plan: Patient was screened for depression during today's encounter  They screened negative with a PHQ-2 score of 0          1  Lumbar pain with radiation down right leg  -     ibuprofen (MOTRIN) 600 mg tablet; Take 1 tablet (600 mg total) by mouth every 6 (six) hours as needed for mild pain or moderate pain  -     XR spine lumbar 2 or 3 views injury; Future; Expected date: 02/18/2022  -     Comprehensive metabolic panel; Future; Expected date: 02/24/2022  -     CBC and differential; Future  -     C-reactive protein; Future  -     Sedimentation rate, automated; Future          Subjective:      Patient ID: Jono Man is a 61 y o  male      HPI    The following portions of the patient's history were reviewed and updated as appropriate: He  has a past medical history of A-fib ([facility] [address] ) (01/01/2005), 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 (03/10/2017), Anemia (12/31/2015), Anxiety, Back ache (08/31/2017), Diarrhea (08/31/2017), Diverticula of colon (01/01/2012), Family history of prostate cancer, GERD (gastroesophageal reflux disease), GERD with esophagitis (08/31/2017), Gout (08/31/2017), Hordeolum externum of lower eyelid (08/31/2017), Hyperlipidemia (05/01/2017), Hypertension, essential (05/01/2017), Hyponatremia (07/10/2014), Loss of taste (05/01/2017), Muscle strain (08/31/2017), Osteoarthritis, Paresthesia (06/08/2018), Raised prostate specific antigen (05/01/2017), Skin lesion (03/08/2018), Spinal stenosis of lumbar region (02/06/2017), Steatosis of liver (08/31/2017), and Weight loss, unintentional (08/31/2017)  He   Patient Active Problem List    Diagnosis Date Noted    Medication refill 11/15/2021    Gastroesophageal reflux disease without esophagitis 11/15/2021    Atrial fibrillation ([facility] [address] ) 03/16/2021    Rib pain on left side 11/20/2020    Rib fractures 11/17/2020    Discitis of lumbar region 05/13/2020    Asymptomatic microscopic hematuria 05/13/2020    History of cervical spinal surgery 08/23/2019    Constipation 08/23/2018    Hyponatremia 08/20/2018    Acute blood loss anemia 08/20/2018    Benign essential HTN 08/20/2018    Myelomalacia ([facility] [address] ) 08/01/2018    Other spondylosis with myelopathy, cervical region 08/01/2018     He  has a past surgical history that includes Other surgical history (03/10/2017); Chest tube insertion (1980); pr arthrodesis posterior/posteriorlateral cervical below c2 (N/A, 8/16/2018); Colonoscopy (11/2018); Back surgery; Cervical spine surgery (08/2018); and pr repair ing hernia,5+y/o,reducibl (Left, 1/28/2021)  His family history includes Kidney disease in his father; Pancreatic cancer in his mother; Prostate cancer in his father  He  reports that he quit smoking about 42 years ago  His smoking use included cigarettes  He has never used smokeless tobacco  He reports current alcohol use of about 5 0 standard drinks of alcohol per week  He reports that he does not use drugs    Current Outpatient Medications   Medication Sig Dispense Refill    allopurinol (ZYLOPRIM) 100 mg tablet TAKE 1 TABLET BY MOUTH EVERY DAY IN THE MORNING 90 tablet 3    amLODIPine (NORVASC) 5 mg tablet Take 1 tablet (5 mg total) by mouth daily 90 tablet 3    aspirin 325 mg tablet Take 325 mg by mouth every other day as needed for mild pain      HYDROcodone-acetaminophen (NORCO) 5-325 mg per tablet Take 0 5 tablets by mouth every 6 (six) hours as needed for pain      ibuprofen (MOTRIN) 600 mg tablet Take 1 tablet (600 mg total) by mouth every 6 (six) hours as needed for mild pain or moderate pain 30 tablet 2    losartan (COZAAR) 100 MG tablet Take 1 tablet (100 mg total) by mouth daily 90 tablet 3    omeprazole (PriLOSEC) 20 mg delayed release capsule Take 1 capsule (20 mg total) by mouth as needed (GERD) 90 capsule 1    sotalol (BETAPACE) 120 mg tablet Take 1 tablet (120 mg total) by mouth 2 (two) times a day 180 tablet 3     No current facility-administered medications for this visit  Current Outpatient Medications on File Prior to Visit   Medication Sig    allopurinol (ZYLOPRIM) 100 mg tablet TAKE 1 TABLET BY MOUTH EVERY DAY IN THE MORNING    amLODIPine (NORVASC) 5 mg tablet Take 1 tablet (5 mg total) by mouth daily    aspirin 325 mg tablet Take 325 mg by mouth every other day as needed for mild pain    HYDROcodone-acetaminophen (NORCO) 5-325 mg per tablet Take 0 5 tablets by mouth every 6 (six) hours as needed for pain    losartan (COZAAR) 100 MG tablet Take 1 tablet (100 mg total) by mouth daily    omeprazole (PriLOSEC) 20 mg delayed release capsule Take 1 capsule (20 mg total) by mouth as needed (GERD)    sotalol (BETAPACE) 120 mg tablet Take 1 tablet (120 mg total) by mouth 2 (two) times a day     No current facility-administered medications on file prior to visit  He has No Known Allergies       Review of Systems   Constitutional: Negative for activity change, appetite change, chills, diaphoresis, fatigue, fever and unexpected weight change     HENT: Negative for congestion, dental problem, drooling, ear discharge, ear pain, facial swelling, mouth sores, nosebleeds, postnasal drip, rhinorrhea, trouble swallowing and voice change  Eyes: Negative for photophobia, pain, discharge, redness, itching and visual disturbance  Respiratory: Negative for apnea, cough, choking, chest tightness and shortness of breath  Denies any and all respiratory issues - SOB, orthopnea, etc    Cardiovascular: Negative for chest pain and leg swelling  Denies any and all cardiac symptoms   Gastrointestinal: Negative for abdominal distention, abdominal pain, constipation, diarrhea and nausea  Endocrine: Negative for polydipsia, polyphagia and polyuria  Genitourinary: Negative for decreased urine volume, difficulty urinating, dysuria, enuresis and hematuria  Musculoskeletal: Negative for arthralgias, gait problem and joint swelling  R butt pain and back pain, onset 3 wks, and shoveled snow  Had surg in 2017 for lumbar abcess  Skin: Negative for color change, pallor, rash and wound  Allergic/Immunologic: Negative for immunocompromised state  Neurological: Negative for dizziness, seizures, syncope, facial asymmetry, speech difficulty, light-headedness and headaches  Hematological: Negative for adenopathy  Psychiatric/Behavioral: Negative for agitation, behavioral problems, confusion and decreased concentration  Objective:      /78   Pulse 61   Temp 97 9 °F (36 6 °C)   Resp 18   Ht 5' 10" (1 778 m)   Wt 56 2 kg (124 lb)   SpO2 98%   BMI 17 79 kg/m²          Physical Exam  Vitals and nursing note reviewed  Constitutional:       General: He is not in acute distress  Appearance: Normal appearance  He is well-developed and normal weight  He is not ill-appearing or diaphoretic  HENT:      Head: Normocephalic and atraumatic  Nose: Nose normal    Eyes:      Pupils: Pupils are equal, round, and reactive to light  Neck:      Trachea: No tracheal deviation     Cardiovascular:      Rate and Rhythm: Normal rate and regular rhythm  Heart sounds: Normal heart sounds  Pulmonary:      Effort: Pulmonary effort is normal  No respiratory distress  Breath sounds: Normal breath sounds  No wheezing or rhonchi  Abdominal:      General: Bowel sounds are normal       Palpations: Abdomen is soft  Hernia: A hernia (had repaired on Jan 28, 2021- Dr Elaina Rebollar  ) is present  Musculoskeletal:         General: Normal range of motion  Cervical back: Normal range of motion and neck supple  No rigidity  No muscular tenderness  Lymphadenopathy:      Cervical: No cervical adenopathy  Skin:     General: Skin is warm and dry  Coloration: Skin is not jaundiced or pale  Findings: No erythema or rash  Neurological:      General: No focal deficit present  Mental Status: He is alert and oriented to person, place, and time  Psychiatric:         Mood and Affect: Mood normal          Behavior: Behavior normal          Thought Content: Thought content normal          Judgment: Judgment normal          30 min with pt discussig eval and examining the low back and getting the unusual hx of abcess of lumbar spine in 2017    NOTE: all of the above issues discussed include chronic illness(es)  with severe exacerbations OR pose threats to life or body function if not managed/monitored effectively  I am as concerned about the long term effects of these disease states, as much as the short term effects  I spent considerable time assuring that my patient  understands  I reviewed prior internal and external notes,  consults,  hospital discharges,  and any other appropriate documents  I  have discussed the management and interpretation of them as well  Again, patient expresses understanding

## 2022-02-17 NOTE — PATIENT INSTRUCTIONS
Chronic Back Pain, Ambulatory Care   GENERAL INFORMATION:   Chronic back pain  is back pain that lasts 3 months or longer  This may include pain that has not been controlled or does not improve with treatment  Your back pain may cause weakness or pain that spreads to your arms or legs  Seek immediate care for the following symptoms:   · Severe pain    · New signs of numbness or weakness, especially in your lower back, legs, arms, or genital area    · Unable to control of your bladder or bowel movements    · A fever or sudden weight loss  Treatment for chronic back pain  may include any of the following:  · NSAIDs  help decrease swelling and pain or fever  This medicine is available with or without a doctor's order  NSAIDs can cause stomach bleeding or kidney problems in certain people  If you take blood thinner medicine, always ask if NSAIDs are safe for you  Always read the medicine label and follow directions  Do not give these medicines to children under 10months of age without direction from your child's doctor  · Acetaminophen  decreases pain  It is available without a doctor's order  Ask how much to take and how often to take it  Follow directions  Acetaminophen can cause liver damage if not taken correctly  · Prescription pain medicine  may be given  Ask how to take this medicine safely  · Muscle relaxers  help decrease muscle spasms and back pain  Manage your chronic back pain:   · Apply heat  on your back for 20 to 30 minutes every 2 hours for as many days as directed  Heat helps decrease pain and muscle spasms  · Stay active  as much as you can without causing more pain  Ask your healthcare provider what exercises are right for you  Do not sit or lie down for long periods  This could make your back pain worse  Avoid heavy lifting until your pain is gone  · Go to physical therapy as directed    A physical therapist teaches you exercises to help improve movement and strength, and to decrease pain   Follow up with your healthcare provider as directed: You may be referred to a sports medicine or spine specialist  Write down your questions so you remember to ask them during your visits  CARE AGREEMENT:   You have the right to help plan your care  Learn about your health condition and how it may be treated  Discuss treatment options with your caregivers to decide what care you want to receive  You always have the right to refuse treatment  The above information is an  only  It is not intended as medical advice for individual conditions or treatments  Talk to your doctor, nurse or pharmacist before following any medical regimen to see if it is safe and effective for you  © 2014 [address] is for End User's use only and may not be sold, redistributed or otherwise used for commercial purposes  All illustrations and images included in CareNotes® are the copyrighted property of [employer]  or Jonn Segovia

## 2022-02-18 ENCOUNTER — HOSPITAL ENCOUNTER (OUTPATIENT)
Dept: RADIOLOGY | Facility: HOSPITAL | Age: 67
Discharge: HOME | End: 2022-02-18
Payer: MEDICARE

## 2022-02-18 DIAGNOSIS — M79.604 LUMBAR PAIN WITH RADIATION DOWN RIGHT LEG: ICD-10-CM

## 2022-02-18 DIAGNOSIS — M54.50 LUMBAR PAIN WITH RADIATION DOWN RIGHT LEG: ICD-10-CM

## 2022-02-18 PROCEDURE — 72100 X-RAY EXAM L-S SPINE 2/3 VWS: CPT

## 2022-02-21 ENCOUNTER — APPOINTMENT (OUTPATIENT)
Dept: LAB | Facility: HOSPITAL | Age: 67
End: 2022-02-21
Payer: MEDICARE

## 2022-02-21 DIAGNOSIS — M54.50 LUMBAR PAIN WITH RADIATION DOWN RIGHT LEG: ICD-10-CM

## 2022-02-21 DIAGNOSIS — M79.604 LUMBAR PAIN WITH RADIATION DOWN RIGHT LEG: ICD-10-CM

## 2022-02-21 LAB
ALBUMIN SERPL BCP-MCNC: 3.8 G/DL (ref 3.4–4.8)
ALP SERPL-CCNC: 69.5 U/L (ref 10–129)
ALT SERPL W P-5'-P-CCNC: 40 U/L (ref 5–63)
ANION GAP SERPL CALCULATED.3IONS-SCNC: 5 MMOL/L (ref 4–13)
AST SERPL W P-5'-P-CCNC: 40 U/L (ref 15–41)
BASOPHILS # BLD AUTO: 0.04 THOUSANDS/ΜL (ref 0–0.1)
BASOPHILS NFR BLD AUTO: 1 % (ref 0–1)
BILIRUB SERPL-MCNC: 0.78 MG/DL (ref 0.3–1.2)
BUN SERPL-MCNC: 6 MG/DL (ref 6–20)
CALCIUM SERPL-MCNC: 9 MG/DL (ref 8.4–10.2)
CHLORIDE SERPL-SCNC: 93 MMOL/L (ref 96–108)
CO2 SERPL-SCNC: 28 MMOL/L (ref 22–33)
CREAT SERPL-MCNC: 0.73 MG/DL (ref 0.5–1.2)
CRP SERPL QL: <0.1 MG/DL (ref 0–1)
EOSINOPHIL # BLD AUTO: 0.18 THOUSAND/ΜL (ref 0–0.61)
EOSINOPHIL NFR BLD AUTO: 4 % (ref 0–6)
ERYTHROCYTE [DISTWIDTH] IN BLOOD BY AUTOMATED COUNT: 12.3 % (ref 11.6–15.1)
ERYTHROCYTE [SEDIMENTATION RATE] IN BLOOD: 3 MM/HOUR (ref 0–20)
GFR SERPL CREATININE-BSD FRML MDRD: 98 ML/MIN/1.73SQ M
GLUCOSE P FAST SERPL-MCNC: 100 MG/DL (ref 70–105)
HCT VFR BLD AUTO: 38 % (ref 36.5–49.3)
HGB BLD-MCNC: 13.3 G/DL (ref 12–17)
IMM GRANULOCYTES # BLD AUTO: 0.01 THOUSAND/UL (ref 0–0.2)
IMM GRANULOCYTES NFR BLD AUTO: 0 % (ref 0–2)
LYMPHOCYTES # BLD AUTO: 0.85 THOUSANDS/ΜL (ref 0.6–4.47)
LYMPHOCYTES NFR BLD AUTO: 20 % (ref 14–44)
MCH RBC QN AUTO: 32.8 PG (ref 26.8–34.3)
MCHC RBC AUTO-ENTMCNC: 35 G/DL (ref 31.4–37.4)
MCV RBC AUTO: 94 FL (ref 82–98)
MONOCYTES # BLD AUTO: 0.5 THOUSAND/ΜL (ref 0.17–1.22)
MONOCYTES NFR BLD AUTO: 12 % (ref 4–12)
NEUTROPHILS # BLD AUTO: 2.58 THOUSANDS/ΜL (ref 1.85–7.62)
NEUTS SEG NFR BLD AUTO: 63 % (ref 43–75)
NRBC BLD AUTO-RTO: 0 /100 WBCS
PLATELET # BLD AUTO: 171 THOUSANDS/UL (ref 149–390)
PMV BLD AUTO: 9.7 FL (ref 8.9–12.7)
POTASSIUM SERPL-SCNC: 4.4 MMOL/L (ref 3.5–5)
PROT SERPL-MCNC: 6.8 G/DL (ref 6.4–8.3)
RBC # BLD AUTO: 4.06 MILLION/UL (ref 3.88–5.62)
SODIUM SERPL-SCNC: 126 MMOL/L (ref 133–145)
WBC # BLD AUTO: 4.16 THOUSAND/UL (ref 4.31–10.16)

## 2022-02-21 PROCEDURE — 85652 RBC SED RATE AUTOMATED: CPT

## 2022-02-21 PROCEDURE — 80053 COMPREHEN METABOLIC PANEL: CPT

## 2022-02-21 PROCEDURE — 86140 C-REACTIVE PROTEIN: CPT

## 2022-02-21 PROCEDURE — 36415 COLL VENOUS BLD VENIPUNCTURE: CPT

## 2022-02-21 PROCEDURE — 85025 COMPLETE CBC W/AUTO DIFF WBC: CPT

## 2022-02-22 DIAGNOSIS — M46.46 DISCITIS OF LUMBAR REGION: Primary | ICD-10-CM

## 2022-02-22 DIAGNOSIS — M47.12 OTHER SPONDYLOSIS WITH MYELOPATHY, CERVICAL REGION: ICD-10-CM

## 2022-02-22 DIAGNOSIS — M54.50 LUMBAR PAIN WITH RADIATION DOWN RIGHT LEG: ICD-10-CM

## 2022-02-22 DIAGNOSIS — M79.604 LUMBAR PAIN WITH RADIATION DOWN RIGHT LEG: ICD-10-CM

## 2022-02-28 DIAGNOSIS — M47.12 OTHER SPONDYLOSIS WITH MYELOPATHY, CERVICAL REGION: ICD-10-CM

## 2022-02-28 DIAGNOSIS — M46.46 DISCITIS OF LUMBAR REGION: Primary | ICD-10-CM

## 2022-02-28 DIAGNOSIS — M54.50 LUMBAR PAIN WITH RADIATION DOWN RIGHT LEG: ICD-10-CM

## 2022-02-28 DIAGNOSIS — M79.604 LUMBAR PAIN WITH RADIATION DOWN RIGHT LEG: ICD-10-CM

## 2022-03-08 ENCOUNTER — APPOINTMENT (EMERGENCY)
Dept: RADIOLOGY | Facility: HOSPITAL | Age: 67
End: 2022-03-08
Payer: MEDICARE

## 2022-03-08 ENCOUNTER — HOSPITAL ENCOUNTER (EMERGENCY)
Facility: HOSPITAL | Age: 67
Discharge: HOME | End: 2022-03-08
Attending: EMERGENCY MEDICINE
Payer: MEDICARE

## 2022-03-08 VITALS
DIASTOLIC BLOOD PRESSURE: 82 MMHG | SYSTOLIC BLOOD PRESSURE: 149 MMHG | RESPIRATION RATE: 16 BRPM | HEART RATE: 72 BPM | OXYGEN SATURATION: 100 % | TEMPERATURE: 97.3 F

## 2022-03-08 DIAGNOSIS — S93.402A SPRAIN OF LEFT ANKLE, UNSPECIFIED LIGAMENT, INITIAL ENCOUNTER: Primary | ICD-10-CM

## 2022-03-08 PROCEDURE — 73610 X-RAY EXAM OF ANKLE: CPT

## 2022-03-08 PROCEDURE — 99282 EMERGENCY DEPT VISIT SF MDM: CPT | Performed by: EMERGENCY MEDICINE

## 2022-03-08 PROCEDURE — 99283 EMERGENCY DEPT VISIT LOW MDM: CPT

## 2022-03-08 NOTE — ED NOTES
Pt refused walker, stated he has one at home  Pt also refused aircast   Pt also reported no ride home, LYFT was offered, pt declined and stated he would walk        Dia Hendrickson RN  03/08/22 9458

## 2022-03-08 NOTE — ED PROVIDER NOTES
[address] NOTE      Pt Name: Gunnar Dickerson  MRN: [id number]  YOB: 1955  Date of evaluation:  3/8/2022  Provider: Kashmir Chery MD    CHIEF COMPLAINT     Chief Complaint   Patient presents with    Fall     Pt arrived via EMS for fall, c/o left ankle pain  HISTORY OF PRESENT ILLNESS  (LOCATION/SYMPTOM, TIMING/ONSET, CONTEXT/SETTING, QUALITY, DURATION, MODIFYING FACTORS, SEVERITY)   Note limiting factors  Gunnar Dickerson is a 61 y o  male who presents to the emergency department following a fall and left ankle pain  Patient got home and was attempting to lift a bag of salt  Secondary to some chronic lower back pain and what he considers some overall muscle weakness, patient fell over  He was then not able to get up under his own power  He laid there for a minute  He then was able to sit upright against the car  Patient then attempted to stand and when he did he rolled the left ankle  At this point he figured he was not going to be able to get up on his own, so he called EMS  HPI    Nursing notes were reviewed  REVIEW OF SYSTEMS  (2-9 systems for level 4, ten or more for level 5)     Review of Systems   Constitutional: Positive for fatigue  Musculoskeletal: Positive for arthralgias (left ankle pain) and back pain (chronic)  Neurological: Positive for weakness (generalized)  Negative for dizziness and light-headedness  All other systems reviewed and are negative          PAST MEDICAL HISTORY     Past Medical History:   Diagnosis Date    A-fib [facility]) 01/01/2005    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 03/10/2017    Anemia 12/31/2015    Anxiety     Back ache 08/31/2017    Diarrhea 08/31/2017    Diverticula of colon 01/01/2012    Family history of prostate cancer     Father    GERD (gastroesophageal reflux disease)     GERD with esophagitis 08/31/2017    Gout 08/31/2017    Hordeolum externum of lower eyelid 08/31/2017    Hyperlipidemia 05/01/2017    Hypertension, essential 05/01/2017    Hyponatremia 07/10/2014    Loss of taste 05/01/2017    Muscle strain 08/31/2017    Osteoarthritis     Paresthesia 06/08/2018    Raised prostate specific antigen 05/01/2017    Skin lesion 03/08/2018    Spinal stenosis of lumbar region 02/06/2017    Steatosis of liver 08/31/2017    Weight loss, unintentional 08/31/2017         SURGICAL HISTORY     Past Surgical History:   Procedure Laterality Date    BACK SURGERY      CERVICAL SPINE SURGERY  08/2018    posterior cervical decompression/laminectomy C3-5, superior 6, fixation/fusion C2-T1    CHEST TUBE INSERTION  1980    Insertion and removal / collapsed lung    COLONOSCOPY  11/2018    5 yrs    OTHER SURGICAL HISTORY  03/10/2017    Abscess removed from L5 & S1    ND ARTHRODESIS POSTERIOR/POSTERIORLATERAL CERVICAL BELOW C2 N/A 8/16/2018    Procedure: Posterior cervical decompression/laminectomy C3-5, superior 6, fixation fusion C2- T1;  Surgeon: Jb Sullivan MD;  [facility]: [facility];  Service: Neurosurgery    ND REPAIR ING HERNIA,5+Y/O,REDUCIBL Left 1/28/2021    Procedure: REPAIR HERNIA INGUINAL with mesh;  Surgeon: Homer Joseph MD;  Location:  MAIN OR;  Service: General         CURRENT MEDICATIONS     Previous Medications    ALLOPURINOL (ZYLOPRIM) 100 MG TABLET    TAKE 1 TABLET BY MOUTH EVERY DAY IN THE MORNING    AMLODIPINE (NORVASC) 5 MG TABLET    Take 1 tablet (5 mg total) by mouth daily    ASPIRIN 325 MG TABLET    Take 325 mg by mouth every other day as needed for mild pain    HYDROCODONE-ACETAMINOPHEN (NORCO) 5-325 MG PER TABLET    Take 0 5 tablets by mouth every 6 (six) hours as needed for pain    IBUPROFEN (MOTRIN) 600 MG TABLET    Take 1 tablet (600 mg total) by mouth every 6 (six) hours as needed for mild pain or moderate pain    LOSARTAN (COZAAR) 100 MG TABLET    Take 1 tablet (100 mg total) by mouth daily    OMEPRAZOLE (PRILOSEC) 20 MG DELAYED RELEASE CAPSULE    Take 1 capsule (20 mg total) by mouth as needed (GERD)    SOTALOL (BETAPACE) 120 MG TABLET    Take 1 tablet (120 mg total) by mouth 2 (two) times a day         ALLERGIES   Patient has no known allergies  SOCIAL HISTORY     Social History     Socioeconomic History    Marital status: Single     Spouse name: None    Number of children: 0    Years of education: HS Grad    Highest education level: High school graduate   Occupational History    Occupation: Maintenance     Comment: [employer]   Tobacco Use    Smoking status: Former Smoker     Types: Cigarettes     Quit date:      Years since quittin 2    Smokeless tobacco: Never Used   Vaping Use    Vaping Use: Never used   Substance and Sexual Activity    Alcohol use: Yes     Alcohol/week: 5 0 standard drinks     Types: 5 Cans of beer per week     Comment: Beer/ 5 daily    Drug use: No    Sexual activity: Yes   Other Topics Concern    None   Social History Narrative    Lives at home alone    · Most recent tobacco use screenin2019      · Do you currently or have you served in the [employer]:   No      Social Determinants of Health     Financial Resource Strain: Low Risk     Difficulty of Paying Living Expenses: Not hard at all   Food Insecurity: No Food Insecurity    Worried About [address] [employer] in the Last Year: Never true    [facility] in the Last Year: Never true   Transportation Needs: No Transportation Needs    Lack of Transportation (Medical): No    Lack of Transportation (Non-Medical): No   Physical Activity: Not on file   Stress: Not on file   Social Connections: Not on file   Intimate Partner Violence: Not on file   Housing Stability: Not on file       Screenings       PHYSICAL EXAM  (Up to 7 for level 4, 8 or more for level 5)     Physical Exam  Vitals and nursing note reviewed  Constitutional:       Appearance: Normal appearance  He is not ill-appearing     HENT: Head: Normocephalic and atraumatic  Nose: Nose normal       Mouth/Throat:      Mouth: Mucous membranes are moist       Pharynx: Oropharynx is clear  Eyes:      Extraocular Movements: Extraocular movements intact  Pupils: Pupils are equal, round, and reactive to light  Cardiovascular:      Rate and Rhythm: Normal rate and regular rhythm  Heart sounds: No murmur heard  Pulmonary:      Effort: Pulmonary effort is normal       Breath sounds: Normal breath sounds  No wheezing, rhonchi or rales  Musculoskeletal:         General: Tenderness and signs of injury present  No deformity  Right ankle: No swelling  Left ankle: Swelling (mild) present  No deformity  Tenderness present over the lateral malleolus  Normal range of motion  Normal pulse  Feet:    Skin:     General: Skin is warm and dry  Capillary Refill: Capillary refill takes less than 2 seconds  Neurological:      General: No focal deficit present  Mental Status: He is alert and oriented to person, place, and time  Cranial Nerves: No cranial nerve deficit  Psychiatric:         Mood and Affect: Mood normal          Behavior: Behavior normal          Thought Content: Thought content normal          Diagnostic results     EKG:  All EKGs are interpreted by the emergency department physician who either signs or cosigned discharge in the absence of a cardiologist         Radiology:  Non plain film images suggest CT, ultrasound and MRI are read by the radiologist   Plain radiographic images are visualized and preliminarily interpreted by the emergency physician see below findings:    XR ankle 3+ views LEFT   ED Interpretation   No acute fracture or dislocation  LABS:  Results Reviewed     None          All other labs were within normal range or not returned as of this dictation      EMERGENCY DEPARTMENT COURSE AND DIFFERENTIAL DIAGNOSIS/MDM:   VITALS:   Vitals:    03/08/22 1701   BP: 149/82 Pulse: 72   Resp: 16   Temp: (!) 97 3 °F (36 3 °C)   TempSrc: Oral   SpO2: 100%       MDM  Number of Diagnoses or Management Options  Sprain of left ankle, unspecified ligament, initial encounter: new and requires workup  Diagnosis management comments: Patient presented to the ED following a fall and left ankle pain  Imaging was negative for acute fracture  This is a good possibility just sprained his ankle  Offered to place him in a ankle splint  Patient declined  He complained about his chronic lower back issues and being weak to the point that he felt like he was having a hard time walking  Patient was able to get up and use the bed is 40% of his walk  He has a cane  Offered a walker, but patient tells me he has a walker at home  With this, I will discharge him at this time  Amount and/or Complexity of Data Reviewed  Tests in the radiology section of CPT®: ordered and reviewed  Independent visualization of images, tracings, or specimens: yes    Patient Progress  Patient progress: stable      Reassessment:      Medications - No data to display    CONSULTS:    Unless otherwise noted below none  Procedures      FINAL IMPRESSION     1   Sprain of left ankle, unspecified ligament, initial encounter            DISPOSITION/PLAN         PATIENT REFERRED TO:  DO Consuel[address]  [address] [address]  [phone]              DISCHARGE MEDICATIONS:  New Prescriptions    No medications on file           (Please note that the portions of the snow were completed with voice recognition program   Efforts were made to admit the dictations but occasionally words are missed transcribed )    José Perry MD (electronically signed) emergency physician                             José Perry MD  03/08/22 1633

## 2022-03-17 ENCOUNTER — OFFICE VISIT (OUTPATIENT)
Dept: FAMILY MEDICINE CLINIC | Facility: CLINIC | Age: 67
End: 2022-03-17
Payer: MEDICARE

## 2022-03-17 VITALS
HEIGHT: 70 IN | RESPIRATION RATE: 18 BRPM | HEART RATE: 60 BPM | OXYGEN SATURATION: 99 % | BODY MASS INDEX: 17.75 KG/M2 | DIASTOLIC BLOOD PRESSURE: 68 MMHG | SYSTOLIC BLOOD PRESSURE: 122 MMHG | TEMPERATURE: 97.7 F | WEIGHT: 124 LBS

## 2022-03-17 DIAGNOSIS — Z13.9 ENCOUNTER FOR SCREENING INVOLVING SOCIAL DETERMINANTS OF HEALTH (SDOH): ICD-10-CM

## 2022-03-17 DIAGNOSIS — M54.50 LUMBAR PAIN WITH RADIATION DOWN RIGHT LEG: Primary | ICD-10-CM

## 2022-03-17 DIAGNOSIS — M79.604 LUMBAR PAIN WITH RADIATION DOWN RIGHT LEG: Primary | ICD-10-CM

## 2022-03-17 PROCEDURE — 99214 OFFICE O/P EST MOD 30 MIN: CPT | Performed by: FAMILY MEDICINE

## 2022-03-17 NOTE — PROGRESS NOTES
Assessment/Plan:66 y o male, very uncomfortable with R sciatica and can't even sleep on the R side  Does radiate to the  LEFT side also  L leg is the "better one": but fell on March 8 while loading car  [employer] in his own garage trying to put [employer] in car, but back was weak and fell over  This occurred twice in the same day, due to a sprained akle  Went to ER and x-ray neg   MRI is denied by the insur  Dr Janelle Cleaning on March 10, 2017  Had relief for about 8 mos  Then gradually recurred and gtting worse  No fever or chills  Given Rx for pain management- Dr Xochitl Shepard -- but was told would be May    Will now send to PT: will send to Cyndee Lopez at [address] [facility]  Is taking no pain meds currently, only Tylenol  Will call to get the Motrin refilled  Has 2 refills  Do get refilled and take with Tylenol up to 3,000 mg / day  Will call for pain management now  He has Medicaid now  Turns 67 end of May  Will work out the [employer]  I offered an Rx for steroids, he prefers not to take  Will let me know: For now:  Do PT where he can get in and f/u with pain management  1  Lumbar pain with radiation down right leg  -     Ambulatory referral to Physical Therapy; Future    2  Encounter for [place] involving social determinants of [facility] [place]  Comments:  lives alone and insur is not that good  Denied MRI of L Sp          Subjective:      Patient ID: Cisco Koch is a 61 y o  male      HPI    The following portions of the patient's history were reviewed and updated as appropriate: He  has a past medical history of A-fib ([address] ) (01/01/2005), 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 (03/10/2017), Anemia (12/31/2015), Anxiety, Back ache (08/31/2017), Diarrhea (08/31/2017), Diverticula of colon (01/01/2012), Family history of prostate cancer, GERD (gastroesophageal reflux disease), GERD with esophagitis (08/31/2017), Gout (08/31/2017), Hordeolum externum of lower eyelid (08/31/2017), Hyperlipidemia (05/01/2017), Hypertension, essential (05/01/2017), Hyponatremia (07/10/2014), Loss of taste (05/01/2017), Muscle strain (08/31/2017), Osteoarthritis, Paresthesia (06/08/2018), Raised prostate specific antigen (05/01/2017), Skin lesion (03/08/2018), Spinal stenosis of lumbar region (02/06/2017), Steatosis of liver (08/31/2017), and Weight loss, unintentional (08/31/2017)  He   Patient Active Problem List    Diagnosis Date Noted    Medication refill 11/15/2021    Gastroesophageal reflux disease without esophagitis 11/15/2021    Atrial fibrillation ([facility] [address] ) 03/16/2021    Rib pain on left side 11/20/2020    Rib fractures 11/17/2020    Discitis of lumbar region 05/13/2020    Asymptomatic microscopic hematuria 05/13/2020    History of cervical spinal surgery 08/23/2019    Constipation 08/23/2018    Hyponatremia 08/20/2018    Acute blood loss anemia 08/20/2018    Benign essential HTN 08/20/2018    Myelomalacia ([facility] [address] ) 08/01/2018    Other spondylosis with myelopathy, cervical region 08/01/2018     He  has a past surgical history that includes Other surgical history (03/10/2017); Chest tube insertion (1980); pr arthrodesis posterior/posteriorlateral cervical below c2 (N/A, 8/16/2018); Colonoscopy (11/2018); Back surgery; Cervical spine surgery (08/2018); and pr repair ing hernia,5+y/o,reducibl (Left, 1/28/2021)  His family history includes Kidney disease in his father; Pancreatic cancer in his mother; Prostate cancer in his father  He  reports that he quit smoking about 42 years ago  His smoking use included cigarettes  He has never used smokeless tobacco  He reports current alcohol use of about 5 0 standard drinks of alcohol per week  He reports that he does not use drugs    Current Outpatient Medications   Medication Sig Dispense Refill    allopurinol (ZYLOPRIM) 100 mg tablet TAKE 1 TABLET BY MOUTH EVERY DAY IN THE MORNING 90 tablet 3    amLODIPine (NORVASC) 5 mg tablet Take 1 tablet (5 mg total) by mouth daily 90 tablet 3    aspirin 325 mg tablet Take 325 mg by mouth every other day as needed for mild pain      HYDROcodone-acetaminophen (NORCO) 5-325 mg per tablet Take 0 5 tablets by mouth every 6 (six) hours as needed for pain      ibuprofen (MOTRIN) 600 mg tablet Take 1 tablet (600 mg total) by mouth every 6 (six) hours as needed for mild pain or moderate pain 30 tablet 2    losartan (COZAAR) 100 MG tablet Take 1 tablet (100 mg total) by mouth daily 90 tablet 3    omeprazole (PriLOSEC) 20 mg delayed release capsule Take 1 capsule (20 mg total) by mouth as needed (GERD) 90 capsule 1    sotalol (BETAPACE) 120 mg tablet Take 1 tablet (120 mg total) by mouth 2 (two) times a day 180 tablet 3     No current facility-administered medications for this visit  Current Outpatient Medications on File Prior to Visit   Medication Sig    allopurinol (ZYLOPRIM) 100 mg tablet TAKE 1 TABLET BY MOUTH EVERY DAY IN THE MORNING    amLODIPine (NORVASC) 5 mg tablet Take 1 tablet (5 mg total) by mouth daily    aspirin 325 mg tablet Take 325 mg by mouth every other day as needed for mild pain    HYDROcodone-acetaminophen (NORCO) 5-325 mg per tablet Take 0 5 tablets by mouth every 6 (six) hours as needed for pain    ibuprofen (MOTRIN) 600 mg tablet Take 1 tablet (600 mg total) by mouth every 6 (six) hours as needed for mild pain or moderate pain    losartan (COZAAR) 100 MG tablet Take 1 tablet (100 mg total) by mouth daily    omeprazole (PriLOSEC) 20 mg delayed release capsule Take 1 capsule (20 mg total) by mouth as needed (GERD)    sotalol (BETAPACE) 120 mg tablet Take 1 tablet (120 mg total) by mouth 2 (two) times a day     No current facility-administered medications on file prior to visit  He has No Known Allergies       Review of Systems   Constitutional: Negative for activity change, appetite change, chills, diaphoresis, fatigue, fever and unexpected weight change     HENT: Negative for congestion, dental problem, drooling, ear discharge, ear pain, facial swelling, mouth sores, nosebleeds, postnasal drip, rhinorrhea, trouble swallowing and voice change  Eyes: Negative for photophobia, pain, discharge, redness, itching and visual disturbance  Respiratory: Negative for apnea, cough, choking, chest tightness and shortness of breath  Denies any and all respiratory issues - SOB, orthopnea, etc    Cardiovascular: Negative for chest pain and leg swelling  Denies any and all cardiac symptoms   Gastrointestinal: Negative for abdominal distention, abdominal pain, constipation, diarrhea and nausea  Endocrine: Negative for polydipsia, polyphagia and polyuria  Genitourinary: Negative for decreased urine volume, difficulty urinating, dysuria, enuresis and hematuria  Musculoskeletal: Negative for arthralgias, gait problem and joint swelling  R butt pain and back pain, onset 3 wks, and shoveled snow  Had surg in 2017 for lumbar abcess  Skin: Negative for color change, pallor, rash and wound  Allergic/Immunologic: Negative for immunocompromised state  Neurological: Negative for dizziness, seizures, syncope, facial asymmetry, speech difficulty, light-headedness and headaches  Hematological: Negative for adenopathy  Psychiatric/Behavioral: Negative for agitation, behavioral problems, confusion and decreased concentration  Objective:      /68   Pulse 60   Temp 97 7 °F (36 5 °C)   Resp 18   Ht 5' 10" (1 778 m)   Wt 56 2 kg (124 lb)   SpO2 99%   BMI 17 79 kg/m²          Physical Exam  Vitals and nursing note reviewed  Constitutional:       General: He is not in acute distress  Appearance: Normal appearance  He is well-developed and normal weight  He is not ill-appearing or diaphoretic  HENT:      Head: Normocephalic and atraumatic  Nose: Nose normal    Eyes:      Pupils: Pupils are equal, round, and reactive to light     Neck: Trachea: No tracheal deviation  Cardiovascular:      Rate and Rhythm: Normal rate and regular rhythm  Heart sounds: Normal heart sounds  Pulmonary:      Effort: Pulmonary effort is normal  No respiratory distress  Breath sounds: Normal breath sounds  No wheezing or rhonchi  Abdominal:      General: Bowel sounds are normal       Palpations: Abdomen is soft  Hernia: A hernia (had repaired on Jan 28, 2021- Dr Louise Caballero  ) is present  Musculoskeletal:         General: Normal range of motion  Cervical back: Normal range of motion and neck supple  No rigidity  No muscular tenderness  Lymphadenopathy:      Cervical: No cervical adenopathy  Skin:     General: Skin is warm and dry  Coloration: Skin is not jaundiced or pale  Findings: No erythema or rash  Neurological:      General: No focal deficit present  Mental Status: He is alert and oriented to person, place, and time  Psychiatric:         Mood and Affect: Mood normal          Behavior: Behavior normal          Thought Content: Thought content normal          Judgment: Judgment normal          30 min with pt and all his questions, there were many  NOTE: all of the above issues discussed include chronic illness(es)  with severe exacerbations OR pose threats to life or body function if not managed/monitored effectively  I am as concerned about the long term effects of these disease states, as much as the short term effects  I spent considerable time assuring that my patient  understands  I reviewed prior internal and external notes,  consults,  hospital discharges,  and any other appropriate documents  I  have discussed the management and interpretation of them as well  Again, patient expresses understanding

## 2022-03-17 NOTE — PATIENT INSTRUCTIONS
Sciatica   WHAT YOU NEED TO KNOW:   What is sciatica? Sciatica is a condition that causes pain along your sciatic nerve  The sciatic nerve runs from your spine through both sides of your buttocks  It then runs down the back of your thigh, into your lower leg and foot  Any place along your sciatic nerve may be compressed, inflamed, irritated, or stretched and cause symptoms  What causes sciatica? Sciatica may be related to certain activities, poor posture, and physical or psychological stress  Any of the following may cause or increase your risk of sciatica:  · Disc problems:  A slipped disc (soft cushion in between the bones of the spine) is the most common cause of sciatica  The disc may press on the sciatic nerve  One bone in your spine may slip over another, or you may have narrowing of the spinal column  · Muscle injury: This may happen after you twist or lift a heavy object  Swelling from sprained or irritated muscles in the buttocks, thighs, or legs press on the sciatic nerve  · Obesity or pregnancy:  Extra weight increases pressure on your back and legs  · Trauma:  Direct blows on the buttocks, thighs, or legs, car accidents, or falls may injure the sciatic nerve  · Diseases of the spine:  Arthritis, osteoporosis, cancer, or infection of the spine may also affect the sciatic nerve  What are the signs and symptoms of sciatica? The symptoms of sciatic may be short-term or long-term:  · Pain that goes from the lower back into your buttocks and down the back of your thigh    · Numbness or tingling in your buttocks and legs    · Muscle weakness, difficulty moving or controlling your leg or foot    · Leg pain that increases with standing, sitting, or squatting    How is sciatica diagnosed? Your healthcare provider will ask about other health conditions you may have  He may ask you about your job, history of back pain, diseases, or surgeries you have had   He will examine you and move your legs to see what increases pain  You may also need any of the following:  · X-rays: This is a picture of the bones and tissues in your back, hip, thigh, or leg  This test may show other problems, such as fractures (broken bones)  · CT scan: This test is also called a CAT scan  An x-ray machine uses a computer to take pictures of your hips, thighs, and legs  The pictures may show your sciatic nerve, muscles, and blood vessels  You may be given a dye before the pictures are taken to help healthcare providers see the pictures better  Tell the healthcare provider if you have ever had an allergic reaction to contrast dye  · MRI:  This scan uses powerful magnets and a computer to take pictures of your hips, thighs, and legs  An MRI may show damaged nerves, muscles, bones, and blood vessels  You may be given dye to help the pictures show up better  Tell the healthcare provider if you have ever had an allergic reaction to contrast dye  Do not enter the MRI room with anything metal  Metal can cause serious injury  Tell the healthcare provider if you have any metal in or on your body  · An electromyography (EMG)  test measures the electrical activity of your muscles at rest and with movement  · Nerve conduction tests: These tests check how surface nerves and related muscles respond to stimulation  Electrodes with wires or tiny needles are placed on certain areas, such as the buttocks and legs  How is sciatica treated? · NSAIDs:  These medicines decrease swelling and pain  NSAIDs are available without a doctor's order  Ask your healthcare provider which medicine is right for you  Ask how much to take and when to take it  Take as directed  NSAIDs can cause stomach bleeding or kidney problems if not taken correctly  · Acetaminophen: This medicine decreases pain  Acetaminophen is available without a doctor's order  Ask how much to take and how often to take it  Follow directions   Acetaminophen can cause liver damage if not taken correctly  · Muscle relaxers  help decrease pain and muscle spasms  · Epidural steroid medicine: This may include both an anesthetic (numbing medicine) and a steroid, which may decrease swelling and relieve pain  It is given as a shot close to the spine in the area where you have pain  · Chemonucleolysis: This is an injection given into the damaged disc to soften or shrink the disc  · Surgery: This may be done to correct problems such as a damaged disc, or a tumor in your spine  It may be done to decrease the pressure on the sciatic nerve  Healthcare providers may also release the muscle that may be pressing into your sciatic nerve  How can I help manage sciatica? · Ultrasound therapy: This is a machine that uses sound waves to decrease pain  Topical medicines may be added to help decrease pain and inflammation  · Physical therapy:  A physical therapist teaches you exercises to help improve movement and strength, and to decrease pain  An occupational therapist teaches you skills to help with your daily activities  · Assistive devices: You may need to wear back support, such as a back brace  You may need crutches, a cane, or a walker to decrease stress on your lower back and leg muscles  Ask your healthcare provider for more information about assistive devices and how to use them correctly  How can sciatica be prevented? · Avoid pressure on your back and legs:  Do not  lift heavy objects, or stand or sit for long periods of time  · Lift objects safely:  Keep your back straight and bend your knees when you  an object  Do not bend or twist your back when you lift  · Maintain a healthy weight:  Ask your healthcare provider how much you should weigh  Ask him to help you create a weight loss plan if you are overweight  · Exercise:  Ask your healthcare provider about the best stretching, warmup, and exercise plan for you      What are the risks of sciatica? An epidural steroid injection can lead to pain disorders or paralysis if it is placed incorrectly  It may also cause headaches, leg pain, and blockage of blood flow to the spinal cord  Surgery may cause you to bleed or get an infection  If not treated, your muscles and nerves may become damaged permanently  You may have decreased strength  You may not be able to move your leg or control when you urinate or have bowel movements  When should I contact my healthcare provider? · You have pain in your lower back at night or when resting  · You have pain in your lower back with numbness below the knee  · You have weakness in one leg only  · You have questions or concerns about your condition or care  When should I seek immediate care or call 911? · You have trouble holding back your urine or bowel movements  · You have weakness in both legs  · You have numbness in your groin or buttocks  CARE AGREEMENT:   You have the right to help plan your care  Learn about your health condition and how it may be treated  Discuss treatment options with your healthcare providers to decide what care you want to receive  You always have the right to refuse treatment  The above information is an  only  It is not intended as medical advice for individual conditions or treatments  Talk to your doctor, nurse or pharmacist before following any medical regimen to see if it is safe and effective for you  © Copyright [employer] 2022 Information is for End User's use only and may not be sold, redistributed or otherwise used for commercial purposes   All illustrations and images included in CareNotes® are the copyrighted property of [employer]  or [address] [place] [address]

## 2022-03-24 DIAGNOSIS — K21.9 GASTROESOPHAGEAL REFLUX DISEASE WITHOUT ESOPHAGITIS: ICD-10-CM

## 2022-03-24 RX ORDER — OMEPRAZOLE 20 MG/1
20 CAPSULE, DELAYED RELEASE ORAL AS NEEDED
Qty: 90 CAPSULE | Refills: 1 | Status: SHIPPED | OUTPATIENT
Start: 2022-03-24 | End: 2022-08-08

## 2022-04-06 DIAGNOSIS — M10.00 IDIOPATHIC GOUT, UNSPECIFIED CHRONICITY, UNSPECIFIED SITE: ICD-10-CM

## 2022-04-06 RX ORDER — ALLOPURINOL 100 MG/1
TABLET ORAL
Qty: 90 TABLET | Refills: 3 | Status: SHIPPED | OUTPATIENT
Start: 2022-04-06

## 2022-04-27 ENCOUNTER — CONSULT (OUTPATIENT)
Dept: PAIN MEDICINE | Facility: CLINIC | Age: 67
End: 2022-04-27
Payer: MEDICARE

## 2022-04-27 VITALS
WEIGHT: 127 LBS | HEART RATE: 57 BPM | SYSTOLIC BLOOD PRESSURE: 137 MMHG | HEIGHT: 70 IN | DIASTOLIC BLOOD PRESSURE: 77 MMHG | RESPIRATION RATE: 16 BRPM | BODY MASS INDEX: 18.18 KG/M2

## 2022-04-27 DIAGNOSIS — M46.46 DISCITIS OF LUMBAR REGION: ICD-10-CM

## 2022-04-27 DIAGNOSIS — M47.12 OTHER SPONDYLOSIS WITH MYELOPATHY, CERVICAL REGION: ICD-10-CM

## 2022-04-27 DIAGNOSIS — M25.551 RIGHT HIP PAIN: ICD-10-CM

## 2022-04-27 DIAGNOSIS — M51.16 INTERVERTEBRAL DISC DISORDER WITH RADICULOPATHY OF LUMBAR REGION: Primary | ICD-10-CM

## 2022-04-27 DIAGNOSIS — M79.604 LUMBAR PAIN WITH RADIATION DOWN RIGHT LEG: ICD-10-CM

## 2022-04-27 DIAGNOSIS — M54.50 LUMBAR PAIN WITH RADIATION DOWN RIGHT LEG: ICD-10-CM

## 2022-04-27 PROCEDURE — 99244 OFF/OP CNSLTJ NEW/EST MOD 40: CPT | Performed by: ANESTHESIOLOGY

## 2022-04-27 RX ORDER — GABAPENTIN 100 MG/1
CAPSULE ORAL
Qty: 60 CAPSULE | Refills: 1 | Status: SHIPPED | OUTPATIENT
Start: 2022-04-27

## 2022-04-27 NOTE — PATIENT INSTRUCTIONS

## 2022-04-27 NOTE — PROGRESS NOTES
Assessment  1  Intervertebral disc disorder with radiculopathy of lumbar region    2  Discitis of lumbar region    3  Other spondylosis with myelopathy, cervical region    4  Lumbar pain with radiation down right leg    5  Right hip pain        Plan  The patient's symptoms, history/physical are consistent with a right-sided radiculopathy secondary to underlying degenerative disc disease  At this time, I advised him that the MRI lumbar spine was approved and is valid until June 9th so he can go ahead and get that scheduled  For now, I would like him to get x-rays of the right hip and pelvis and will start him on gabapentin 100 mg at bedtime to help with radicular symptoms  He was apprised of the most common side effects including sleepiness and dizziness  My impressions and treatment recommendations were discussed in detail with the patient who verbalized understanding and had no further questions  Discharge instructions were provided  I personally saw and examined the patient and I agree with the above discussed plan of care  Orders Placed This Encounter   Procedures    XR hip/pelv 2-3 vws right if performed     Standing Status:   Future     Standing Expiration Date:   4/27/2026     Scheduling Instructions:      Bring along any outside films relating to this procedure  New Medications Ordered This Visit   Medications    gabapentin (NEURONTIN) 100 mg capsule     Sig: Take 1 tablet at bedtime  May increase to 2 tablets after 3 days     Dispense:  60 capsule     Refill:  1       History of Present Illness    Chanel Horne is a 61 y o  male referred by Dr Mohamud Jones for lower back and right-sided leg pain that has been present for over a year  Symptoms are moderate to severe rated 7/10 on numeric rating scale and felt constantly  Pain is dull aching and sharp in the lower back and travels down the right leg with weakness  He does use a cane for ambulation    Symptoms are aggravated with standing, bending, exercise and decreased with lying down  Treatment history has included physical therapy and exercise which has provided no relief  Heat/ice and use of a TENS unit have provided moderate relief  He uses Tylenol with some relief  Of note, the patient does have a history of diskitis/abscess and underwent surgery with Dr Hernández in 2017 for that  I have personally reviewed and/or updated the patient's past medical history, past surgical history, family history, social history, current medications, allergies, and vital signs today  Review of Systems   Constitutional: Negative for fever and unexpected weight change  HENT: Negative for trouble swallowing  Eyes: Negative for visual disturbance  Respiratory: Negative for shortness of breath and wheezing  Cardiovascular: Negative for chest pain and palpitations  Gastrointestinal: Negative for constipation, diarrhea, nausea and vomiting  Endocrine: Negative for cold intolerance, heat intolerance and polydipsia  Genitourinary: Negative for difficulty urinating and frequency  Musculoskeletal: Positive for back pain, gait problem and joint swelling  Negative for arthralgias and myalgias  LLE Pain   Skin: Negative for rash  Neurological: Positive for numbness  Negative for dizziness, seizures, syncope, weakness and headaches  Hematological: Does not bruise/bleed easily  Psychiatric/Behavioral: Positive for decreased concentration  Negative for dysphoric mood  All other systems reviewed and are negative        Patient Active Problem List   Diagnosis    Myelomalacia [facility])    Other spondylosis with myelopathy, cervical region    Hyponatremia    Acute blood loss anemia    Benign essential HTN    Constipation    History of cervical spinal surgery    Discitis of lumbar region    Asymptomatic microscopic hematuria    Rib fractures    Rib pain on left side    Atrial fibrillation (HCC)    Medication refill    Gastroesophageal reflux disease without esophagitis       Past Medical History:   Diagnosis Date    A-fib ([address] ) 01/01/2005    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 03/10/2017    Anemia 12/31/2015    Anxiety     Back ache 08/31/2017    Diarrhea 08/31/2017    Diverticula of colon 01/01/2012    Family history of prostate cancer     Father    GERD (gastroesophageal reflux disease)     GERD with esophagitis 08/31/2017    Gout 08/31/2017    Hordeolum externum of lower eyelid 08/31/2017    Hyperlipidemia 05/01/2017    Hypertension, essential 05/01/2017    Hyponatremia 07/10/2014    Loss of taste 05/01/2017    Muscle strain 08/31/2017    Osteoarthritis     Paresthesia 06/08/2018    Raised prostate specific antigen 05/01/2017    Skin lesion 03/08/2018    Spinal stenosis of lumbar region 02/06/2017    Steatosis of liver 08/31/2017    Weight loss, unintentional 08/31/2017       Past Surgical History:   Procedure Laterality Date    BACK SURGERY      CERVICAL SPINE SURGERY  08/2018    posterior cervical decompression/laminectomy C3-5, superior 6, fixation/fusion C2-T1    CHEST TUBE INSERTION  1980    Insertion and removal / collapsed lung    COLONOSCOPY  11/2018    5 yrs    OTHER SURGICAL HISTORY  03/10/2017    Abscess removed from L5 & S1    [place] ARTHRODESIS POSTERIOR/POSTERIORLATERAL CERVICAL BELOW C2 N/A 8/16/2018    Procedure: Posterior cervical decompression/laminectomy C3-5, superior 6, fixation fusion C2- T1;  Surgeon: Arun Freitas MD;  [facility];  Service: Neurosurgery    TN REPAIR ING HERNIA,5+Y/O,REDUCIBL Left 1/28/2021    Procedure: REPAIR HERNIA INGUINAL with mesh;  Surgeon: Roopa Paul MD;  [facility];  Service: General       Family History   Problem Relation Age of Onset    Prostate cancer Father     Kidney disease Father     Pancreatic cancer Mother        Social History     Occupational History    Occupation: Maintenance     Comment: [employer]   Tobacco Use    Smoking status: Former Smoker     Types: Cigarettes     Quit date:      Years since quittin 3    Smokeless tobacco: Never Used   Vaping Use    Vaping Use: Never used   Substance and Sexual Activity    Alcohol use: Yes     Alcohol/week: 5 0 standard drinks     Types: 5 Cans of beer per week     Comment: Beer/ 5 daily    Drug use: No    Sexual activity: Yes       Current Outpatient Medications on File Prior to Visit   Medication Sig    allopurinol (ZYLOPRIM) 100 mg tablet TAKE 1 TABLET BY MOUTH EVERY DAY IN THE MORNING    amLODIPine (NORVASC) 5 mg tablet Take 1 tablet (5 mg total) by mouth daily    aspirin 325 mg tablet Take 325 mg by mouth every other day as needed for mild pain    HYDROcodone-acetaminophen (NORCO) 5-325 mg per tablet Take 0 5 tablets by mouth every 6 (six) hours as needed for pain    ibuprofen (MOTRIN) 600 mg tablet Take 1 tablet (600 mg total) by mouth every 6 (six) hours as needed for mild pain or moderate pain    losartan (COZAAR) 100 MG tablet Take 1 tablet (100 mg total) by mouth daily    omeprazole (PriLOSEC) 20 mg delayed release capsule TAKE 1 CAPSULE (20 MG TOTAL) BY MOUTH AS NEEDED (GERD)    sotalol (BETAPACE) 120 mg tablet Take 1 tablet (120 mg total) by mouth 2 (two) times a day     No current facility-administered medications on file prior to visit  No Known Allergies    Physical Exam    /77   Pulse 57   Resp 16   Ht 5' 10" (1 778 m)   Wt 57 6 kg (127 lb)   BMI 18 22 kg/m²     Constitutional: normal, well developed, well nourished, alert, in no distress and non-toxic and no overt pain behavior    Eyes: anicteric  HEENT: grossly intact  Neck: supple, symmetric, trachea midline and no masses   Pulmonary:even and unlabored  Cardiovascular:No edema or pitting edema present  Skin:Normal without rashes or lesions and well hydrated  Psychiatric:Mood and affect appropriate  Neurologic:Cranial Nerves II-XII grossly intact  Musculoskeletal:normal     Lumbar Spine Exam  Appearance:  Normal lordosis  Palpation/Tenderness:  right lumbar paraspinal tenderness  right sacroiliac joint tenderness  right piriformis tenderness  Range of Motion:  Flexion: Moderately limited  with pain  Extension:  Moderately limited  with pain  Motor Strength:  Left hip flexion:  5/5  Left hip extension:  5/5  Right hip flexion:  4/5  Right hip extension:  5/5  Left knee flexion:  5/5  Left knee extension:  5/5  Right knee flexion:  5/5  Right knee extension:  4/5  Left foot dorsiflexion:  5/5  Left foot plantar flexion:  5/5  Right foot dorsiflexion:  5/5  Right foot plantar flexion:  5/5  Reflexes:  Left Patellar:  2+   Right Patellar:  2+   Left Achilles:  2+   Right Achilles:  2+     Imaging    XR LUMBAR SPINE (2/18/2022)     INDICATION:   M54 50: Low back pain, unspecified  M79 604: Pain in right leg      COMPARISON:  None     VIEWS:  XR SPINE LUMBAR 2 OR 3 VIEWS INJURY        FINDINGS:     There are 5 non rib bearing lumbar vertebral bodies       There is no evidence of acute fracture or destructive osseous lesion      Mild curvature of the thoracolumbar spine to the right       Scattered mild degenerative changes along the lumbosacral spine  The bones appear demineralized      The pedicles appear intact      There are atherosclerotic calcifications  Soft tissues are otherwise unremarkable

## 2022-05-24 ENCOUNTER — OFFICE VISIT (OUTPATIENT)
Dept: FAMILY MEDICINE CLINIC | Facility: CLINIC | Age: 67
End: 2022-05-24
Payer: MEDICARE

## 2022-05-24 VITALS
DIASTOLIC BLOOD PRESSURE: 70 MMHG | RESPIRATION RATE: 18 BRPM | BODY MASS INDEX: 18.41 KG/M2 | OXYGEN SATURATION: 98 % | TEMPERATURE: 97.3 F | SYSTOLIC BLOOD PRESSURE: 126 MMHG | HEART RATE: 54 BPM | HEIGHT: 70 IN | WEIGHT: 128.6 LBS

## 2022-05-24 DIAGNOSIS — M54.50 LUMBAR PAIN WITH RADIATION DOWN RIGHT LEG: Primary | ICD-10-CM

## 2022-05-24 DIAGNOSIS — I10 BENIGN ESSENTIAL HTN: ICD-10-CM

## 2022-05-24 DIAGNOSIS — M10.00 IDIOPATHIC GOUT, UNSPECIFIED CHRONICITY, UNSPECIFIED SITE: ICD-10-CM

## 2022-05-24 DIAGNOSIS — M79.604 LUMBAR PAIN WITH RADIATION DOWN RIGHT LEG: Primary | ICD-10-CM

## 2022-05-24 DIAGNOSIS — I48.91 ATRIAL FIBRILLATION, UNSPECIFIED TYPE (HCC): ICD-10-CM

## 2022-05-24 DIAGNOSIS — Z00.00 ENCOUNTER FOR MEDICARE ANNUAL WELLNESS EXAM: ICD-10-CM

## 2022-05-24 PROCEDURE — 99214 OFFICE O/P EST MOD 30 MIN: CPT | Performed by: FAMILY MEDICINE

## 2022-05-24 PROCEDURE — G0438 PPPS, INITIAL VISIT: HCPCS | Performed by: FAMILY MEDICINE

## 2022-05-24 PROCEDURE — 1123F ACP DISCUSS/DSCN MKR DOCD: CPT | Performed by: FAMILY MEDICINE

## 2022-05-24 NOTE — PROGRESS NOTES
Assessment and Plan:     Problem List Items Addressed This Visit        Cardiovascular and Mediastinum    Benign essential HTN    Atrial fibrillation ([facility] )      Other Visit Diagnoses     Lumbar pain with radiation down right leg    -  Primary    Doing much better and he is a different person than he was the last time I saw him with regards to pain much better MRI planned for 4 days from is following wit    Idiopathic gout, unspecified chronicity, unspecified site        No issue of gout but continues with allopurinol tolerating well          Depression Screening and Follow-up Plan: Patient was screened for depression during today's encounter  They screened negative with a PHQ-2 score of 0  Preventive health issues were discussed with patient, and age appropriate screening tests were ordered as noted in patient's After Visit Summary  Personalized health advice and appropriate referrals for health education or preventive services given if needed, as noted in patient's After Visit Summary       History of Present Illness:     Patient presents for Medicare Annual Wellness visit    Patient Care Team:  Casimiro Samaniego DO as PCP - General (Family Medicine)  Noa Gomez MD as PCP - [address] [place], [place] [id number] [facility])     Problem List:     Patient Active Problem List   Diagnosis    [facility]elomalacia [facility])    Other spondylosis with myelopathy, cervical region    Hyponatremia    Acute blood loss anemia    Benign essential HTN    Constipation    History of cervical spinal surgery    Discitis of lumbar region    Asymptomatic microscopic hematuria    Rib fractures    Rib pain on left side    Atrial fibrillation ([address] )    Medication refill    Gastroesophageal reflux disease without esophagitis      Past Medical and Surgical History:     Past Medical History:   Diagnosis Date    A-fib ([facility] ) 01/01/2005    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 03/10/2017    Anemia 12/31/2015    Anxiety     Back ache 08/31/2017    Diarrhea 08/31/2017    Diverticula of colon 01/01/2012    Family history of prostate cancer     Father    GERD (gastroesophageal reflux disease)     GERD with esophagitis 08/31/2017    Gout 08/31/2017    Hordeolum externum of lower eyelid 08/31/2017    Hyperlipidemia 05/01/2017    Hypertension, essential 05/01/2017    Hyponatremia 07/10/2014    Loss of taste 05/01/2017    Muscle strain 08/31/2017    Osteoarthritis     Paresthesia 06/08/2018    Raised prostate specific antigen 05/01/2017    Skin lesion 03/08/2018    Spinal stenosis of lumbar region 02/06/2017    Steatosis of liver 08/31/2017    Weight loss, unintentional 08/31/2017     Past Surgical History:   Procedure Laterality Date    BACK SURGERY      CERVICAL SPINE SURGERY  08/2018    posterior cervical decompression/laminectomy C3-5, superior 6, fixation/fusion C2-T1    CHEST TUBE INSERTION  1980    Insertion and removal / collapsed lung    COLONOSCOPY  11/2018    5 yrs    OTHER SURGICAL HISTORY  03/10/2017    Abscess removed from L5 & S1    [place] ARTHRODESIS POSTERIOR/POSTERIORLATERAL CERVICAL BELOW C2 N/A 8/16/2018    Procedure: Posterior cervical decompression/laminectomy C3-5, superior 6, fixation fusion C2- T1;  Surgeon: Britney Pate MD;  [facility];  Service: Neurosurgery    [place] REPAIR ING HERNIA,5+Y/O,REDUCIBL Left 1/28/2021    Procedure: REPAIR HERNIA INGUINAL with mesh;  Surgeon: Everardo Cooks, MD;  [facility];  Service: General      Family History:     Family History   Problem Relation Age of Onset    Prostate cancer Father     Kidney disease Father     Pancreatic cancer Mother       Social History:     Social History     Socioeconomic History    Marital status: Single     Spouse name: None    Number of children: 0    Years of education: HS Grad    Highest education level: High school graduate   Occupational History    Occupation: Maintenance     Comment: [employer]   Tobacco Use    Smoking status: Former Smoker     Types: Cigarettes     Quit date:      Years since quittin 4    Smokeless tobacco: Never Used   Vaping Use    Vaping Use: Never used   Substance and Sexual Activity    Alcohol use: Yes     Alcohol/week: 5 0 standard drinks     Types: 5 Cans of beer per week     Comment: Beer/ 5 daily    Drug use: No    Sexual activity: Yes   Other Topics Concern    None   Social History Narrative    Lives at home alone    · Most recent tobacco use screenin2019      · Do you currently or have you served in [employer]:   No      Social Determinants of Health     Financial Resource Strain: Not on file   Food Insecurity: Not on file   Transportation Needs: Not on file   Physical Activity: Not on file   Stress: Not on file   Social Connections: Not on file   Intimate Partner Violence: Not on file   Housing Stability: Not on file      Medications and Allergies:     Current Outpatient Medications   Medication Sig Dispense Refill    allopurinol (ZYLOPRIM) 100 mg tablet TAKE 1 TABLET BY MOUTH EVERY DAY IN THE MORNING 90 tablet 3    amLODIPine (NORVASC) 5 mg tablet Take 1 tablet (5 mg total) by mouth daily 90 tablet 3    aspirin 325 mg tablet Take 325 mg by mouth every other day as needed for mild pain      gabapentin (NEURONTIN) 100 mg capsule Take 1 tablet at bedtime  May increase to 2 tablets after 3 days 60 capsule 1    ibuprofen (MOTRIN) 600 mg tablet Take 1 tablet (600 mg total) by mouth every 6 (six) hours as needed for mild pain or moderate pain 30 tablet 2    losartan (COZAAR) 100 MG tablet Take 1 tablet (100 mg total) by mouth daily 90 tablet 3    omeprazole (PriLOSEC) 20 mg delayed release capsule TAKE 1 CAPSULE (20 MG TOTAL) BY MOUTH AS NEEDED (GERD) 90 capsule 1    sotalol (BETAPACE) 120 mg tablet Take 1 tablet (120 mg total) by mouth 2 (two) times a day 180 tablet 3     No current facility-administered medications for this visit  No Known Allergies   Immunizations: There is no immunization history on file for this patient  Health Maintenance:         Topic Date Due    Colorectal Cancer Screening  11/01/2028    HIV Screening  Completed    Hepatitis C Screening  Completed     There are no preventive care reminders to display for this patient  Medicare Health Risk Assessment:     /70 (BP Location: Left arm, Patient Position: Sitting, Cuff Size: Standard)   Pulse (!) 54   Temp (!) 97 3 °F (36 3 °C) (Temporal)   Resp 18   Ht 5' 10" (1 778 m)   Wt 58 3 kg (128 lb 9 6 oz)   SpO2 98%   BMI 18 45 kg/m²      Bryce Mccabe is here for his Initial Wellness visit  Health Risk Assessment:   Patient rates overall health as good  Patient feels that their physical health rating is same  Patient is satisfied with their life  Eyesight was rated as same  Hearing was rated as same  Patient feels that their emotional and mental health rating is same  Patients states they are never, rarely angry  Patient states they are sometimes unusually tired/fatigued  Pain experienced in the last 7 days has been some  Patient's pain rating has been 6/10  Patient states that he has experienced no weight loss or gain in last 6 months  Depression Screening:   PHQ-2 Score: 0      Fall Risk Screening: In the past year, patient has experienced: no history of falling in past year      Home Safety:  Patient does not have trouble with stairs inside or outside of their home  Patient has working smoke alarms and has no working carbon monoxide detector  Home safety hazards include: none  Nutrition:   Current diet is Regular  Medications:   Patient is not currently taking any over-the-counter supplements  Patient is able to manage medications       Activities of Daily Living (ADLs)/Instrumental Activities of Daily Living (IADLs):   Walk and transfer into and out of bed and chair?: Yes  Dress and groom yourself?: Yes    Bathe or shower yourself?: Yes Feed yourself? Yes  Do your laundry/housekeeping?: Yes  Manage your money, pay your bills and track your expenses?: Yes  Make your own meals?: Yes    Do your own shopping?: Yes    Previous Hospitalizations:   Any hospitalizations or ED visits within the last 12 months?: Yes    How many hospitalizations have you had in the last year?: 1-2    Advance Care Planning:   Living will: No    Durable POA for healthcare: No    Advanced directive: No    Advanced directive counseling given: Yes    Five wishes given: Yes    Patient declined ACP directive: No    End of Life Decisions reviewed with patient: Yes    Provider agrees with end of life decisions: Yes      Cognitive Screening:   Provider or family/friend/caregiver concerned regarding cognition?: No    PREVENTIVE SCREENINGS      Cardiovascular Screening:    General: Screening Current      Diabetes Screening:     General: Screening Current      Colorectal Cancer Screening:     General: Screening Current      Prostate Cancer Screening:    General: Risks and Benefits Discussed      Abdominal Aortic Aneurysm (AAA) Screening:    Risk factors include: tobacco use        Lung Cancer Screening:     General: Screening Not Indicated      Hepatitis C Screening:    General: Screening Current    Hep C Screening Accepted: No     Screening, Brief Intervention, and Referral to Treatment (SBIRT)    Screening  Typical number of drinks in a day: 0  Typical number of drinks in a week: 3  Interpretation: Low risk drinking behavior  Single Item Drug Screening:  How often have you used an illegal drug (including marijuana) or a prescription medication for non-medical reasons in the past year? never    Single Item Drug Screen Score: 0  Interpretation: Negative screen for possible drug use disorder    Brief Intervention  Alcohol & drug use screenings were reviewed  No concerns regarding substance use disorder identified  Healthy alcohol use/limits discussed       Other Counseling Topics: Car/seat belt/driving safety, skin self-exam, sunscreen and calcium and vitamin D intake and regular weightbearing exercise         Sanaz Rivas, DO

## 2022-05-24 NOTE — PROGRESS NOTES
Assessment/Plan:  60 yo male, saw Dr Inés Street, will have MRI in few days and then go back to him  The pain is bearable  Await MRI this Sat  Then may do the TESHA  He seems like a different pt then last OV when he was in more pain  Depression Screening and Follow-up Plan: Patient was screened for depression during today's encounter  They screened negative with a PHQ-2 score of 0          1  Lumbar pain with radiation down right leg  Comments:  Doing much better and he is a different person than he was the last time I saw him with regards to pain much better MRI planned for 4 days from is following wit    2  Atrial fibrillation, unspecified type ([facility] [address] )  Comments:  Started on sotalol 120 mg b i d  by Dr Magdalene Hastings and 2008 or so doing well    3  Benign essential HTN  Comments:  Blood pressure controlled on current meds and tolerating well    4  Idiopathic gout, unspecified chronicity, unspecified site  Comments:  No issue of gout but continues with allopurinol tolerating well    5  Encounter for Medicare annual wellness exam          Subjective:      Patient ID: Pepe Frankel is a 74 y o  male      HPI    The following portions of the patient's history were reviewed and updated as appropriate: He  has a past medical history of A-fib ([facility] [address] ) (01/01/2005), 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 (03/10/2017), Anemia (12/31/2015), Anxiety, Back ache (08/31/2017), Diarrhea (08/31/2017), Diverticula of colon (01/01/2012), Family history of prostate cancer, GERD (gastroesophageal reflux disease), GERD with esophagitis (08/31/2017), Gout (08/31/2017), Hordeolum externum of lower eyelid (08/31/2017), Hyperlipidemia (05/01/2017), Hypertension, essential (05/01/2017), Hyponatremia (07/10/2014), Loss of taste (05/01/2017), Muscle strain (08/31/2017), Osteoarthritis, Paresthesia (06/08/2018), Raised prostate specific antigen (05/01/2017), Skin lesion (03/08/2018), Spinal stenosis of lumbar region (02/06/2017), Steatosis of liver (08/31/2017), and Weight loss, unintentional (08/31/2017)  He   Patient Active Problem List    Diagnosis Date Noted    Medication refill 11/15/2021    Gastroesophageal reflux disease without esophagitis 11/15/2021    Atrial fibrillation ([facility] [address] ) 03/16/2021    Rib pain on left side 11/20/2020    Rib fractures 11/17/2020    Discitis of lumbar region 05/13/2020    Asymptomatic microscopic hematuria 05/13/2020    History of cervical spinal surgery 08/23/2019    Constipation 08/23/2018    Hyponatremia 08/20/2018    Acute blood loss anemia 08/20/2018    Benign essential HTN 08/20/2018    Myelomalacia ([facility] [address] ) 08/01/2018    Other spondylosis with myelopathy, cervical region 08/01/2018     He  has a past surgical history that includes Other surgical history (03/10/2017); Chest tube insertion (1980); pr arthrodesis posterior/posteriorlateral cervical below c2 (N/A, 8/16/2018); Colonoscopy (11/2018); Back surgery; Cervical spine surgery (08/2018); and pr repair ing hernia,5+y/o,reducibl (Left, 1/28/2021)  His family history includes Kidney disease in his father; Pancreatic cancer in his mother; Prostate cancer in his father  He  reports that he quit smoking about 42 years ago  His smoking use included cigarettes  He has never used smokeless tobacco  He reports current alcohol use of about 5 0 standard drinks of alcohol per week  He reports that he does not use drugs  Current Outpatient Medications   Medication Sig Dispense Refill    allopurinol (ZYLOPRIM) 100 mg tablet TAKE 1 TABLET BY MOUTH EVERY DAY IN THE MORNING 90 tablet 3    amLODIPine (NORVASC) 5 mg tablet Take 1 tablet (5 mg total) by mouth daily 90 tablet 3    aspirin 325 mg tablet Take 325 mg by mouth every other day as needed for mild pain      gabapentin (NEURONTIN) 100 mg capsule Take 1 tablet at bedtime    May increase to 2 tablets after 3 days 60 capsule 1    ibuprofen (MOTRIN) 600 mg tablet Take 1 tablet (600 mg total) by mouth every 6 (six) hours as needed for mild pain or moderate pain 30 tablet 2    losartan (COZAAR) 100 MG tablet Take 1 tablet (100 mg total) by mouth daily 90 tablet 3    omeprazole (PriLOSEC) 20 mg delayed release capsule TAKE 1 CAPSULE (20 MG TOTAL) BY MOUTH AS NEEDED (GERD) 90 capsule 1    sotalol (BETAPACE) 120 mg tablet Take 1 tablet (120 mg total) by mouth 2 (two) times a day 180 tablet 3     No current facility-administered medications for this visit  Current Outpatient Medications on File Prior to Visit   Medication Sig    allopurinol (ZYLOPRIM) 100 mg tablet TAKE 1 TABLET BY MOUTH EVERY DAY IN THE MORNING    amLODIPine (NORVASC) 5 mg tablet Take 1 tablet (5 mg total) by mouth daily    aspirin 325 mg tablet Take 325 mg by mouth every other day as needed for mild pain    gabapentin (NEURONTIN) 100 mg capsule Take 1 tablet at bedtime  May increase to 2 tablets after 3 days    ibuprofen (MOTRIN) 600 mg tablet Take 1 tablet (600 mg total) by mouth every 6 (six) hours as needed for mild pain or moderate pain    losartan (COZAAR) 100 MG tablet Take 1 tablet (100 mg total) by mouth daily    omeprazole (PriLOSEC) 20 mg delayed release capsule TAKE 1 CAPSULE (20 MG TOTAL) BY MOUTH AS NEEDED (GERD)    sotalol (BETAPACE) 120 mg tablet Take 1 tablet (120 mg total) by mouth 2 (two) times a day     No current facility-administered medications on file prior to visit  He has No Known Allergies       Review of Systems   Constitutional: Negative for activity change, appetite change, chills, diaphoresis, fatigue, fever and unexpected weight change  HENT: Negative for congestion, dental problem, drooling, ear discharge, ear pain, facial swelling, mouth sores, nosebleeds, postnasal drip, rhinorrhea, trouble swallowing and voice change  Eyes: Negative for photophobia, pain, discharge, redness, itching and visual disturbance  Respiratory: Negative for apnea, cough, choking, chest tightness and shortness of breath  Denies any and all respiratory issues - SOB, orthopnea, etc    Cardiovascular: Negative for chest pain and leg swelling  Denies any and all cardiac symptoms now, but in 2008 Dr Shanna Donovan put on Sotalol 120 mg BID for a fib  [place] No issue since then  Gastrointestinal: Negative for abdominal distention, abdominal pain, constipation, diarrhea and nausea  Endocrine: Negative for polydipsia, polyphagia and polyuria  Genitourinary: Negative for decreased urine volume, difficulty urinating, dysuria, enuresis and hematuria  Musculoskeletal: Negative for arthralgias, gait problem and joint swelling  R butt pain and back pain, onset 3 wks, and shoveled snow  Had surg in 2017 for lumbar abcess  Skin: Negative for color change, pallor, rash and wound  Allergic/Immunologic: Negative for immunocompromised state  Neurological: Negative for dizziness, seizures, syncope, facial asymmetry, speech difficulty, light-headedness and headaches  Hematological: Negative for adenopathy  Psychiatric/Behavioral: Negative for agitation, behavioral problems, confusion and decreased concentration  Objective:      /70 (BP Location: Left arm, Patient Position: Sitting, Cuff Size: Standard)   Pulse (!) 54   Temp (!) 97 3 °F (36 3 °C) (Temporal)   Resp 18   Ht 5' 10" (1 778 m)   Wt 58 3 kg (128 lb 9 6 oz)   SpO2 98%   BMI 18 45 kg/m²          Physical Exam  Vitals and nursing note reviewed  Constitutional:       General: He is not in acute distress  Appearance: Normal appearance  He is well-developed and normal weight  He is not ill-appearing or diaphoretic  HENT:      Head: Normocephalic and atraumatic  Nose: Nose normal    Eyes:      Pupils: Pupils are equal, round, and reactive to light  Neck:      Trachea: No tracheal deviation  Cardiovascular:      Rate and Rhythm: Normal rate and regular rhythm  Heart sounds: Normal heart sounds     Pulmonary:      Effort: Pulmonary effort is normal  No respiratory distress  Breath sounds: Normal breath sounds  No wheezing or rhonchi  Abdominal:      General: Bowel sounds are normal       Palpations: Abdomen is soft  Hernia: No hernia (had repaired on Jan 28, 2021- Dr Angelica Bay  ) is present  Musculoskeletal:         General: Normal range of motion  Cervical back: Normal range of motion and neck supple  No rigidity  No muscular tenderness  Lymphadenopathy:      Cervical: No cervical adenopathy  Skin:     General: Skin is warm and dry  Coloration: Skin is not jaundiced or pale  Findings: No erythema or rash  Neurological:      General: No focal deficit present  Mental Status: He is alert and oriented to person, place, and time  Psychiatric:         Mood and Affect: Mood normal          Behavior: Behavior normal          Thought Content: Thought content normal          Judgment: Judgment normal          30 minutes with patient answering all of his many questions reviewing labs and other studies  This time was spent reviewing previous records, reviewing previous laboratory and other tests, taking history from patient, examination of patient, discussion of prognosis and treatment, ordering laboratory tests, ordering medications, and completion of the medical record

## 2022-05-24 NOTE — PATIENT INSTRUCTIONS
Medicare Preventive Visit Patient Instructions  Thank you for completing your Welcome to Medicare Visit or Medicare Annual Wellness Visit today  Your next wellness visit will be due in one year (5/25/2023)  The screening/preventive services that you may require over the next 5-10 years are detailed below  Some tests may not apply to you based off risk factors and/or age  Screening tests ordered at today's visit but not completed yet may show as past due  Also, please note that scanned in results may not display below  Preventive Screenings:  Service Recommendations Previous Testing/Comments   Colorectal Cancer Screening  · Colonoscopy    · Fecal Occult Blood Test (FOBT)/Fecal Immunochemical Test (FIT)  · Fecal DNA/Cologuard Test  · Flexible Sigmoidoscopy Age: 54-65 years old   Colonoscopy: every 10 years (May be performed more frequently if at higher risk)  OR  FOBT/FIT: every 1 year  OR  Cologuard: every 3 years  OR  Sigmoidoscopy: every 5 years  Screening may be recommended earlier than age 48 if at higher risk for colorectal cancer  Also, an individualized decision between you and your healthcare provider will decide whether screening between the ages of 74-80 would be appropriate   Colonoscopy: 11/01/2018  FOBT/FIT: Not on file  Cologuard: Not on file  Sigmoidoscopy: Not on file    Screening Current     Prostate Cancer Screening Individualized decision between patient and health care provider in men between ages of 53-78   Medicare will cover every 12 months beginning on the day after your 50th birthday PSA: 3 2 ng/mL           Hepatitis C Screening Once for adults born between 1945 and 1965  More frequently in patients at high risk for Hepatitis C Hep C Antibody: 01/13/2021    Screening Current   Diabetes Screening 1-2 times per year if you're at risk for diabetes or have pre-diabetes Fasting glucose: 100 mg/dL   A1C: 4 8 %    Screening Current   Cholesterol Screening Once every 5 years if you don't have a lipid disorder  May order more often based on risk factors  Lipid panel: 01/13/2021    Screening Current      Other Preventive Screenings Covered by Medicare:  1  Abdominal Aortic Aneurysm (AAA) Screening: covered once if your at risk  You're considered to be at risk if you have a family history of AAA or a male between the age of 73-68 who smoking at least 100 cigarettes in your lifetime  2  Lung Cancer Screening: covers low dose CT scan once per year if you meet all of the following conditions: (1) Age 50-69; (2) No signs or symptoms of lung cancer; (3) Current smoker or have quit smoking within the last 15 years; (4) You have a tobacco smoking history of at least 30 pack years (packs per day x number of years you smoked); (5) You get a written order from a healthcare provider  3  Glaucoma Screening: covered annually if you're considered high risk: (1) You have diabetes OR (2) Family history of glaucoma OR (3)  aged 48 and older OR (3)  American aged 72 and older  3  Osteoporosis Screening: covered every 2 years if you meet one of the following conditions: (1) Have a vertebral abnormality; (2) On glucocorticoid therapy for more than 3 months; (3) Have primary hyperparathyroidism; (4) On osteoporosis medications and need to assess response to drug therapy  5  HIV Screening: covered annually if you're between the age of 12-76  Also covered annually if you are younger than 13 and older than 72 with risk factors for HIV infection  For pregnant patients, it is covered up to 3 times per pregnancy      Immunizations:  Immunization Recommendations   Influenza Vaccine Annual influenza vaccination during flu season is recommended for all persons aged >= 6 months who do not have contraindications   Pneumococcal Vaccine (Prevnar and Pneumovax)  * Prevnar = PCV13  * Pneumovax = PPSV23 Adults 25-60 years old: 1-3 doses may be recommended based on certain risk factors  Adults 72 years old: Prevnar (PCV13) vaccine recommended followed by Pneumovax (PPSV23) vaccine  If already received PPSV23 since turning 65, then PCV13 recommended at least one year after PPSV23 dose  Hepatitis B Vaccine 3 dose series if at intermediate or high risk (ex: diabetes, end stage renal disease, liver disease)   Tetanus (Td) Vaccine - COST NOT COVERED BY MEDICARE PART B Following completion of primary series, a booster dose should be given every 10 years to maintain immunity against tetanus  Td may also be given as tetanus wound prophylaxis  Tdap Vaccine - COST NOT COVERED BY MEDICARE PART B Recommended at least once for all adults  For pregnant patients, recommended with each pregnancy  Shingles Vaccine (Shingrix) - COST NOT COVERED BY MEDICARE PART B  2 shot series recommended in those aged 48 and above     Health Maintenance Due:      Topic Date Due    Colorectal Cancer Screening  11/01/2028    HIV Screening  Completed    Hepatitis C Screening  Completed     Immunizations Due:  There are no preventive care reminders to display for this patient  Advance Directives   What are advance directives? Advance directives are legal documents that state your wishes and plans for medical care  These plans are made ahead of time in case you lose your ability to make decisions for yourself  Advance directives can apply to any medical decision, such as the treatments you want, and if you want to donate organs  What are the types of advance directives? There are many types of advance directives, and each state has rules about how to use them  You may choose a combination of any of the following:  · Living will: This is a written record of the treatment you want  You can also choose which treatments you do not want, which to limit, and which to stop at a certain time  This includes surgery, medicine, IV fluid, and tube feedings  · Durable power of  for healthcare [place] [facility]):   This is a written record that states who you want to make healthcare choices for you when you are unable to make them for yourself  This person, called a proxy, is usually a family member or a friend  You may choose more than 1 proxy  · Do not resuscitate (DNR) order:  A DNR order is used in case your heart stops beating or you stop breathing  It is a request not to have certain forms of treatment, such as CPR  A DNR order may be included in other types of advance directives  · Medical directive: This covers the care that you want if you are in a coma, near death, or unable to make decisions for yourself  You can list the treatments you want for each condition  Treatment may include pain medicine, surgery, blood transfusions, dialysis, IV or tube feedings, and a ventilator (breathing machine)  · Values history: This document has questions about your views, beliefs, and how you feel and think about life  This information can help others choose the care that you would choose  Why are advance directives important? An advance directive helps you control your care  Although spoken wishes may be used, it is better to have your wishes written down  Spoken wishes can be misunderstood, or not followed  Treatments may be given even if you do not want them  An advance directive may make it easier for your family to make difficult choices about your care  Underweight  Underweight is defined as having a body mass index (BMI) of less than 18 5 kg/m2   Anorexia  is a loss of appetite, decreased food intake, or both  Your appetite naturally decreases as you get older  You also get full faster than you used to  This occurs because your body needs less energy  Other body changes can also lead to a decreased appetite  Even though some appetite loss is normal, you still need to get enough calories and nutrients to keep you healthy  You can start to lose too much weight if you do not eat as much food as your body needs   Unwanted weight loss can cause health problems, or worsen health problems you already have  You can also become dehydrated if you do not drink enough liquid  How to eat healthy and get enough nutrients:   · Choose healthy foods  Eat a variety of fruits, vegetables, whole grains, low-fat dairy foods, lean meats, and other protein foods  Limit foods high in fat, sugar, and salt  Limit or avoid alcohol as directed  Work with a dietitian to help you plan your meals if you need to follow a special diet  A dietitian can also teach you how to modify foods if you have trouble chewing or swallowing  · Snack on healthy foods between meals  if you only eat a small amount during meals  Snacks provide extra healthy nutrients and calories between meals  Examples include fruit, cheese, and whole grain crackers  · Drink liquids as directed  to avoid dehydration  Drink liquids between meals if they cause you to get full too quickly during meals  Ask how much liquid to drink each day and which liquids are best for you  · Use herbs, spices, and flavor enhancers to add flavor to foods  Avoid using herbs and spice blends that also contain sodium  Ask your healthcare provider or dietitian about flavor enhancers  Flavor enhancers with ham, natural de souza, and roast beef flavors can also be sprinkled on food to add flavor  · Share meals with others as often as you can  Eating with others may help you to eat better during meal time  Ask family members, neighbors, or friends to join you for lunch  There are also senior centers where you can meet people, and share meals with them  · Ask family and friends for help  with shopping or preparing foods  Ask for a ride to the grocery store, if needed  © Copyright [employer] 2018 Information is for End User's use only and may not be sold, redistributed or otherwise used for commercial purposes   All illustrations and images included in CareNotes® are the copyrighted property of [employer]  or [facility] [email or web address]

## 2022-05-28 ENCOUNTER — HOSPITAL ENCOUNTER (OUTPATIENT)
Dept: MRI IMAGING | Facility: HOSPITAL | Age: 67
Discharge: HOME | End: 2022-05-28
Attending: FAMILY MEDICINE
Payer: MEDICARE

## 2022-05-28 ENCOUNTER — HOSPITAL ENCOUNTER (OUTPATIENT)
Dept: RADIOLOGY | Facility: HOSPITAL | Age: 67
Discharge: HOME | End: 2022-05-28

## 2022-05-28 DIAGNOSIS — M47.12 OTHER SPONDYLOSIS WITH MYELOPATHY, CERVICAL REGION: ICD-10-CM

## 2022-05-28 DIAGNOSIS — M54.50 LUMBAR PAIN WITH RADIATION DOWN RIGHT LEG: ICD-10-CM

## 2022-05-28 DIAGNOSIS — M25.551 RIGHT HIP PAIN: ICD-10-CM

## 2022-05-28 DIAGNOSIS — M79.604 LUMBAR PAIN WITH RADIATION DOWN RIGHT LEG: ICD-10-CM

## 2022-05-28 DIAGNOSIS — M46.46 DISCITIS OF LUMBAR REGION: ICD-10-CM

## 2022-05-28 PROCEDURE — G1004 CDSM NDSC: HCPCS

## 2022-05-28 PROCEDURE — 72148 MRI LUMBAR SPINE W/O DYE: CPT

## 2022-05-28 PROCEDURE — 73502 X-RAY EXAM HIP UNI 2-3 VIEWS: CPT

## 2022-05-31 DIAGNOSIS — R93.89 ABNORMAL MRI: ICD-10-CM

## 2022-05-31 DIAGNOSIS — M79.604 LUMBAR PAIN WITH RADIATION DOWN RIGHT LEG: Primary | ICD-10-CM

## 2022-05-31 DIAGNOSIS — M54.50 LUMBAR PAIN WITH RADIATION DOWN RIGHT LEG: Primary | ICD-10-CM

## 2022-05-31 NOTE — PROGRESS NOTES
Please let him know he needs to see Neurosurgery   If any worsening of pain weakness or incont -- ER

## 2022-06-24 ENCOUNTER — HOSPITAL ENCOUNTER (OUTPATIENT)
Dept: RADIOLOGY | Age: 67
Discharge: HOME | End: 2022-06-24
Payer: MEDICARE

## 2022-06-24 DIAGNOSIS — M79.604 LUMBAR PAIN WITH RADIATION DOWN RIGHT LEG: ICD-10-CM

## 2022-06-24 DIAGNOSIS — M47.12 OTHER SPONDYLOSIS WITH MYELOPATHY, CERVICAL REGION: ICD-10-CM

## 2022-06-24 DIAGNOSIS — M54.50 LUMBAR PAIN WITH RADIATION DOWN RIGHT LEG: ICD-10-CM

## 2022-06-24 DIAGNOSIS — M46.46 DISCITIS OF LUMBAR REGION: ICD-10-CM

## 2022-06-24 PROCEDURE — 77080 DXA BONE DENSITY AXIAL: CPT

## 2022-06-30 NOTE — ASSESSMENT & PLAN NOTE
Presents as referral from PCP [facility] Castellano) for lumbar back pain  · History of discitis/osteomyelitis s/p epidural abscess resection with Dr Nellie Rivera at [facility] in 2017  Imaging:  · MRI lumbar spine wo, 5/28/2022: There are no findings particularly suspicious for discitis as clinically questioned  Advanced degenerative disc disease noted at L4-5 with Modic type I degenerative marrow signal noted  Probable synovial facet cyst dorsally in the canal measures 8 x 6 x 5 mm contributing to severe central stenosis with moderate to severe facet hypertrophy bilaterally  Right greater than left foraminal narrowing noted  Mild degenerative changes are seen elsewhere

## 2022-07-01 ENCOUNTER — OFFICE VISIT (OUTPATIENT)
Dept: NEUROSURGERY | Facility: CLINIC | Age: 67
End: 2022-07-01
Payer: MEDICARE

## 2022-07-01 VITALS
HEART RATE: 59 BPM | WEIGHT: 127.2 LBS | HEIGHT: 70 IN | RESPIRATION RATE: 16 BRPM | SYSTOLIC BLOOD PRESSURE: 156 MMHG | TEMPERATURE: 98.2 F | DIASTOLIC BLOOD PRESSURE: 77 MMHG | BODY MASS INDEX: 18.21 KG/M2

## 2022-07-01 DIAGNOSIS — M54.50 LUMBAR PAIN WITH RADIATION DOWN RIGHT LEG: ICD-10-CM

## 2022-07-01 DIAGNOSIS — R93.89 ABNORMAL MRI: ICD-10-CM

## 2022-07-01 DIAGNOSIS — M79.604 LUMBAR PAIN WITH RADIATION DOWN RIGHT LEG: ICD-10-CM

## 2022-07-01 PROBLEM — M54.16 LUMBAR RADICULOPATHY: Status: ACTIVE | Noted: 2022-07-01

## 2022-07-01 PROCEDURE — 99213 OFFICE O/P EST LOW 20 MIN: CPT | Performed by: NURSE PRACTITIONER

## 2022-07-01 NOTE — PROGRESS NOTES
Neurosurgery Office Note  Turner Burt 74 y o  male MRN: [id number]      Assessment/Plan     Other spondylosis with myelopathy, cervical region  S/p C2-T1 PCDF with Dr Jose Luis Sterling 8/2018  Lumbar radiculopathy  Presents as referral from PCP [facility] Ball) for lumbar back pain  · History of discitis/osteomyelitis s/p epidural abscess resection with Dr Lucio Joseph at [facility] in 2017  · C/o worsening right sided low back pain, radiating in S1 distribution down leg for about 1 year  · Evaluated by Dr Paolo Grijalva on 4/27 and started on gabapentin which has been helpful  · Completed course of PT in April that did not help his symptoms  · Exam non-focal  No motor strength weakness  No bowel or bladder issues  Imaging:  · MRI lumbar spine wo, 5/28/2022: There are no findings particularly suspicious for discitis as clinically questioned  Advanced degenerative disc disease noted at L4-5 with Modic type I degenerative marrow signal noted  Probable synovial facet cyst dorsally in the canal measures 8 x 6 x 5 mm contributing to severe central stenosis with moderate to severe facet hypertrophy bilaterally  Right greater than left foraminal narrowing noted  Mild degenerative changes are seen elsewhere  · Lumbar x-rays, 2/18/2022: Mild curvature of the thoracolumbar spine to the right  Scattered mild degenerative changes along the lumbosacral spine  The bones appear demineralized  The pedicles appear intact  There are atherosclerotic calcifications  Soft tissues are otherwise unremarkable  Plan:  · Reviewed imaging extensively with patient  · Discussed that he has evidence of extensive degenerative changes within lumbar spine particularly at L4-5 with associated synovial cyst   · Reviewed that he will mostly likely benefit from facet join injection to this area  · Recommend following up with Dr Paolo Grijalva    · Further discussed that if his symptoms are refractory to pain management/injections, will need to consider surgical intervention including possible fusion  · Reviewed that should he experience any worsening symptoms or weakness/numbness he should return sooner for evaluation  · Follow up in 3 months for clinical review  · Patient verbalized understanding and is in agreement with plan  Diagnoses and all orders for this visit:    Lumbar pain with radiation down right leg  -     Ambulatory Referral to Neurosurgery    Abnormal MRI  -     Ambulatory Referral to Neurosurgery          I spent 20 minutes with the patient today in which >50% of the time was spent counseling/coordination of care regarding diagnosis, imaging review, symptoms and treatment plan  CHIEF COMPLAINT    Chief Complaint   Patient presents with    Consult     LBP MRI LSPINE 5/28/22 SL, XR LSPINE 2/18/22 SL last seen Aug 2019       HISTORY    History of Present Illness     74y o  year old male with past medical history of GERD, HTN, cervical myelopathy, gout, atrial fibrillation on ASA, s/p C2-T1 fusion with Dr Fredi Hyatt in 2018, who presents as referral from PCP for evaluation of lumbar back pain radiating into his right hip and posteriorly down his leg for about 1 year  Year ago and has subsequently worsened  They are associated with some mild numbness in the back of his leg  He has chronic numbness to his hands and feet from his previous cervical issues  He has been ambulating for the last year with a cane  He states the symptoms get worse the more he walks and he develops weakness and heaviness in his legs  He denies any bowel or bladder incontinence  He established care with Dr Lilli Montague in April and was started on gabapentin which he states has improved his symptoms  Also completed a course of physical therapy in April which he states did not help his symptoms at all  He states he feels his balance is somewhat affected as well  He does not take any medication for pain  He lives at home alone    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  HPI    See Discussion    REVIEW OF SYSTEMS    Review of Systems   Constitutional: Negative  HENT: Negative  Eyes: Negative  Respiratory: Negative  Cardiovascular: Negative  Gastrointestinal: Negative  Endocrine: Negative  Genitourinary: Negative  Musculoskeletal: Positive for back pain (across lower back, right side lower back, right buttock, right hip, and right leg) and gait problem (uses cane for assistance, last fall in March 2022)  Skin: Negative  Allergic/Immunologic: Negative  Neurological: Positive for weakness (bilateral legs) and numbness (bilateral feet, mild and completely numb in bilateral hands/fingers)  Negative for dizziness, tremors, seizures, syncope and headaches  Hematological: Bruises/bleeds easily (patient on  PRN)  Psychiatric/Behavioral: Positive for sleep disturbance (due to pain, has to sleep on right side or flat on back)  ROS reviewed and edited as needed  Meds/Allergies     Current Outpatient Medications   Medication Sig Dispense Refill    allopurinol (ZYLOPRIM) 100 mg tablet TAKE 1 TABLET BY MOUTH EVERY DAY IN THE MORNING 90 tablet 3    amLODIPine (NORVASC) 5 mg tablet Take 1 tablet (5 mg total) by mouth daily 90 tablet 3    aspirin 325 mg tablet Take 325 mg by mouth every other day as needed for mild pain      gabapentin (NEURONTIN) 100 mg capsule Take 1 tablet at bedtime    May increase to 2 tablets after 3 days 60 capsule 1    ibuprofen (MOTRIN) 600 mg tablet Take 1 tablet (600 mg total) by mouth every 6 (six) hours as needed for mild pain or moderate pain 30 tablet 2    losartan (COZAAR) 100 MG tablet Take 1 tablet (100 mg total) by mouth daily 90 tablet 3    omeprazole (PriLOSEC) 20 mg delayed release capsule TAKE 1 CAPSULE (20 MG TOTAL) BY MOUTH AS NEEDED (GERD) 90 capsule 1    sotalol (BETAPACE) 120 mg tablet Take 1 tablet (120 mg total) by mouth 2 (two) times a day 180 tablet 3 No current facility-administered medications for this visit         No Known Allergies    PAST HISTORY    Past Medical History:   Diagnosis Date    A-fib ([address] ) 2005    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 03/10/2017    Anemia 2015    Anxiety     Back ache 2017    Diarrhea 2017    Diverticula of colon 2012    Family history of prostate cancer     Father    GERD (gastroesophageal reflux disease)     GERD with esophagitis 2017    Gout 2017    Hordeolum externum of lower eyelid 2017    Hyperlipidemia 2017    Hypertension, essential 2017    Hyponatremia 07/10/2014    Loss of taste 2017    Muscle strain 2017    Osteoarthritis     Paresthesia 2018    Raised prostate specific antigen 2017    Skin lesion 2018    Spinal stenosis of lumbar region 2017    Steatosis of liver 2017    Weight loss, unintentional 2017       Past Surgical History:   Procedure Laterality Date    BACK SURGERY      CERVICAL SPINE SURGERY  2018    posterior cervical decompression/laminectomy C3-5, superior 6, fixation/fusion C2-T1    CHEST TUBE INSERTION  1980    Insertion and removal / collapsed lung    COLONOSCOPY  2018    5 yrs    OTHER SURGICAL HISTORY  03/10/2017    Abscess removed from L5 & S1    SC ARTHRODESIS POSTERIOR/POSTERIORLATERAL CERVICAL BELOW C2 N/A 2018    Procedure: Posterior cervical decompression/laminectomy C3-5, superior 6, fixation fusion C2- T1;  Surgeon: Jb Sullivan MD;  Location: [facility];  Service: Neurosurgery    SC REPAIR ING HERNIA,5+Y/O,REDUCIBL Left 2021    Procedure: REPAIR HERNIA INGUINAL with mesh;  Surgeon: Homer Jsoeph MD;  Location:  MAIN OR;  Service: General       Social History     Tobacco Use    Smoking status: Former Smoker     Types: Cigarettes     Quit date:      Years since quittin 5    Smokeless tobacco: Never Used   Vaping Use  Vaping Use: Never used   Substance Use Topics    Alcohol use: Yes     Alcohol/week: 5 0 standard drinks     Types: 5 Cans of beer per week     Comment: Beer/ 5 daily    Drug use: No       Family History   Problem Relation Age of Onset    Prostate cancer Father     Kidney disease Father     Pancreatic cancer Mother          Above history personally reviewed  EXAM    Vitals:Blood pressure 156/77, pulse 59, temperature 98 2 °F (36 8 °C), resp  rate 16, height 5' 10" (1 778 m), weight 57 7 kg (127 lb 3 2 oz)  ,Body mass index is 18 25 kg/m²  Physical Exam  Constitutional:       General: He is not in acute distress  Appearance: He is well-developed  He is not diaphoretic  Comments: cachectic   Eyes:      General:         Right eye: No discharge  Left eye: No discharge  Extraocular Movements: EOM normal       Conjunctiva/sclera: Conjunctivae normal       Pupils: Pupils are equal, round, and reactive to light  Pulmonary:      Effort: Pulmonary effort is normal  No respiratory distress  Abdominal:      General: Bowel sounds are normal  There is no distension  Palpations: Abdomen is soft  Tenderness: There is no abdominal tenderness  Musculoskeletal:         General: Normal range of motion  Cervical back: Normal range of motion and neck supple  Skin:     General: Skin is warm and dry  Neurological:      Mental Status: He is alert and oriented to person, place, and time  Cranial Nerves: No cranial nerve deficit  Sensory: Sensory deficit present  Motor: No weakness  Coordination: Coordination normal  Finger-Nose-Finger Test normal       Deep Tendon Reflexes: Reflexes normal       Reflex Scores:       Patellar reflexes are 2+ on the right side and 2+ on the left side  Achilles reflexes are 2+ on the right side and 2+ on the left side    Psychiatric:         Speech: Speech normal          Behavior: Behavior normal          Thought Content: Thought content normal          Judgment: Judgment normal          Neurologic Exam     Mental Status   Oriented to person, place, and time  Oriented to person  Oriented to place  Oriented to time  Oriented to year, month and date  Registration: recalls 3 of 3 objects  Attention: normal  Concentration: normal    Speech: speech is normal   Level of consciousness: alert  Knowledge: good and consistent with education  Able to name object  Cranial Nerves     CN III, IV, VI   Pupils are equal, round, and reactive to light  Extraocular motions are normal    Right pupil: Size: 3 mm  Shape: regular  Reactivity: brisk  Consensual response: intact  Accommodation: intact  Left pupil: Size: 3 mm  Shape: regular  Reactivity: brisk  Consensual response: intact  Accommodation: intact  Nystagmus: none   Diplopia: none  Conjugate gaze: present    CN V   Right facial sensation deficit: none  Left facial sensation deficit: none    CN VII   Facial expression full, symmetric       CN VIII   Hearing: intact    CN IX, X   Palate: symmetric    CN XI   Right sternocleidomastoid strength: normal  Left sternocleidomastoid strength: normal  Right trapezius strength: normal  Left trapezius strength: normal    CN XII   Tongue: not atrophic  Fasciculations: absent  Tongue deviation: none    Motor Exam   Muscle bulk: normal  Overall muscle tone: normal  Right arm pronator drift: absent  Left arm pronator drift: absent    Strength   Right deltoid: 5/5  Left deltoid: 5/5  Right biceps: 5/5  Left biceps: 5/5  Right triceps: 5/5  Left triceps: 5/5  Right quadriceps: 5/5  Left quadriceps: 5/5  Right hamstrin/5  Left hamstrin/5  Right glutei: 5/5  Left glutei: 5/5  Right anterior tibial: 5/5  Left anterior tibial: 5/5  Right posterior tibial: 5/5  Left posterior tibial: 5/5  Right peroneal: 5/5  Left peroneal: 5/5  Right gastroc: 5/5  Left gastroc: 5/5    Sensory Exam   Light touch normal    Proprioception normal    Bilateral hand and feet numbness     Gait, Coordination, and Reflexes     Coordination   Finger to nose coordination: normal    Tremor   Resting tremor: absent  Intention tremor: absent  Action tremor: absent    Reflexes   Right patellar: 2+  Left patellar: 2+  Right achilles: 2+  Left achilles: 2+  Right Salazar: absent  Left Salazar: absent  Right ankle clonus: absent  Left ankle clonus: absent        MEDICAL DECISION MAKING    Imaging Studies:     DXA bone density spine hip and pelvis    Result Date: 6/25/2022  Narrative: CENTRAL DXA SCAN CLINICAL HISTORY:  60-year-old  male  Family history minimally traumatic hip fracture  Prilosec use  Osteoporosis screening  OTHER RISK FACTORS:  Admits to drinking 3 or more alcoholic beverages per day as per the patient questionnaire, filled out by the patient at the time of the bone densitometry study  [facility]OPOROSIS:  None  TECHNIQUE: Bone densitometry was performed using a [employer] Horizon A  bone densitometer  Regions of interest appear properly placed  COMPARISON: There are no prior DXA studies performed on this unit for comparison  RESULTS: LUMBAR SPINE L1-L3 (L4 vertebra excluded from analysis due to local structural abnormalities or artifact): BMD  0 895  gm/cm2 T-score -1 6  These values are artifactually elevated due to the presence of scoliosis with spondylosis  LEFT  TOTAL HIP: BMD:  0 831  gm/cm2 T-score:  -1 3 LEFT  FEMORAL NECK: BMD:  0 626  gm/cm2 T score: -2 2     Impression: 1  Low bone mass (OSTEOPENIA)  [Based on the left femoral neck] 2  The 10 year risk of hip fracture is 3 % with the 10 year risk of major osteoporotic fracture being 15 % as calculated by the [facility]/WHO fracture risk assessment tool (FRAX)    3   The current NOF guidelines recommend treating patients with a T-score of -2 5 or less in the lumbar spine or hips, or in post-menopausal women and men over the age of 48 with low bone mass (osteopenia) and a FRAX 10 year risk score of >3% for hip fracture and/or >20% for major osteoporotic fracture  4   The NOF recommends follow-up DXA in 1-2 years after initiating therapy for osteoporosis and every 2 years thereafter  More frequent evaluation is appropriate for patients with conditions associated with rapid bone loss, such as glucocorticoid therapy  The interval between DXA screenings may be longer for individuals without major risk factors and initial T-score in the normal or upper low bone mass range  The FRAX algorithm has certain limitations: -FRAX has not been validated in patients currently or previously treated with pharmacotherapy for osteoporosis  In such patients, clinical judgment must be exercised in interpreting FRAX scores  -Prior hip, vertebral and humeral fragility fractures appear to confer greater risk of subsequent fracture than fractures at other sites (this is especially true for individuals with severe vertebral fractures), but quantification of this incremental risk is not possible with FRAX  -FRAX underestimates fracture risk in patients with history of multiple fragility fractures  -FRAX may underestimate fracture risk in patients with history of frequent falls  -It is not appropriate to use FRAX to monitor treatment response  WHO CLASSIFICATION: Normal (a T-score of -1 0 or higher) Low bone mineral density (a T-score of less than -1 0 but higher than -2 5) Osteoporosis (a T-score of -2 5 or less) Severe osteoporosis (a T-score of -2 5 or less with a fragility fracture) Workstation performed: [id number]       I have personally reviewed pertinent reports     and I have personally reviewed pertinent films in PACS

## 2022-07-01 NOTE — ASSESSMENT & PLAN NOTE
Presents as referral from PCP [facility] Billy) for lumbar back pain  · History of discitis/osteomyelitis s/p epidural abscess resection with Dr Flako Woods at [facility] in 2017  · C/o worsening right sided low back pain, radiating in S1 distribution down leg for about 1 year  · Evaluated by Dr Stephane Rick on 4/27 and started on gabapentin which has been helpful  · Completed course of PT in April that did not help his symptoms  · Exam non-focal  No motor strength weakness  No bowel or bladder issues  Imaging:  · MRI lumbar spine wo, 5/28/2022: There are no findings particularly suspicious for discitis as clinically questioned  Advanced degenerative disc disease noted at L4-5 with Modic type I degenerative marrow signal noted  Probable synovial facet cyst dorsally in the canal measures 8 x 6 x 5 mm contributing to severe central stenosis with moderate to severe facet hypertrophy bilaterally  Right greater than left foraminal narrowing noted  Mild degenerative changes are seen elsewhere  · Lumbar x-rays, 2/18/2022: Mild curvature of the thoracolumbar spine to the right  Scattered mild degenerative changes along the lumbosacral spine  The bones appear demineralized  The pedicles appear intact  There are atherosclerotic calcifications  Soft tissues are otherwise unremarkable  Plan:  · Reviewed imaging extensively with patient  · Discussed that he has evidence of extensive degenerative changes within lumbar spine particularly at L4-5 with associated synovial cyst   · Reviewed that he will mostly likely benefit from facet join injection to this area  · Recommend following up with Dr Stephane Rick  · Further discussed that if his symptoms are refractory to pain management/injections, will need to consider surgical intervention including possible fusion  · Reviewed that should he experience any worsening symptoms or weakness/numbness he should return sooner for evaluation     · Follow up in 3 months for clinical review  · Patient verbalized understanding and is in agreement with plan

## 2022-07-05 ENCOUNTER — TELEPHONE (OUTPATIENT)
Dept: PAIN MEDICINE | Facility: CLINIC | Age: 67
End: 2022-07-05

## 2022-07-05 DIAGNOSIS — M71.38 SYNOVIAL CYST OF LUMBAR FACET JOINT: Primary | ICD-10-CM

## 2022-07-05 NOTE — TELEPHONE ENCOUNTER
Patient would like imaging results and the next steps in care - report is final in chart - thank you         [phone]

## 2022-07-06 NOTE — TELEPHONE ENCOUNTER
S/w pt, advised of the same, provided number for Dr Shara Kraft office  Pt verbalized understanding and appreciation  Advised to cb with any further questions or concerns

## 2022-07-06 NOTE — TELEPHONE ENCOUNTER
Pt informed of lumbar MRI results regarding synovial cyst causing severe central stenosis and recommendation from [facility] for referral to Dr Gilda Lyon for aspiration and injection of cyst   Pt is confused b/c he saw the [facility] of neurosurgery on 7/1 and she thought [facility] could treat the cyst   They also scheduled pt to see Dr Anna Boles in Oct  Pt said Dr Anna Boles did his previous neck sx and wonders if he would be taking care of his back as well? Pt asking what sequence he should be doing things  Should he be seeing Dr Gilda Lyon or just see Dr Inga Chapman in Oct?  Pls advise

## 2022-07-06 NOTE — TELEPHONE ENCOUNTER
Please let patient know that MRI lumbar spine showed synovial cyst at the right L4-5 level that is causing severe central stenosis    Would recommend consultation with Dr Lorena Tate for aspiration and injection

## 2022-07-06 NOTE — TELEPHONE ENCOUNTER
He should see Dr Trinity Salcido for possible cyst drainage  If he doesn't think he can do it, then I will give him an injection   I placed the referral

## 2022-08-06 DIAGNOSIS — I10 BENIGN ESSENTIAL HTN: ICD-10-CM

## 2022-08-08 ENCOUNTER — OFFICE VISIT (OUTPATIENT)
Dept: NEUROSURGERY | Facility: CLINIC | Age: 67
End: 2022-08-08
Payer: MEDICARE

## 2022-08-08 VITALS
DIASTOLIC BLOOD PRESSURE: 82 MMHG | BODY MASS INDEX: 17.87 KG/M2 | TEMPERATURE: 98.4 F | WEIGHT: 124.8 LBS | RESPIRATION RATE: 16 BRPM | HEIGHT: 70 IN | SYSTOLIC BLOOD PRESSURE: 176 MMHG | HEART RATE: 60 BPM

## 2022-08-08 DIAGNOSIS — M71.38 SYNOVIAL CYST OF LUMBAR FACET JOINT: ICD-10-CM

## 2022-08-08 PROCEDURE — 99213 OFFICE O/P EST LOW 20 MIN: CPT | Performed by: RADIOLOGY

## 2022-08-08 NOTE — PROGRESS NOTES
Assessment/Plan:     Diagnoses and all orders for this visit:    Synovial cyst of lumbar facet joint  -     Ambulatory referral to Neurosurgery        Discussion Summary:   Mr Atilio Molina has severe pain due to a multifactorial process that includes disc herniation and bilateral facet arthrosis at L4-5 as well as superimposed synovial cyst   Fortunately radiculopathy has improved with gabapentin  We discussed options of traditional surgery to address the above issues vs a more limited and less invasive approach of addressing the cyst with a CT guided laminotomy cyst fenestration  He would like to go home and think about his options  He will contact us once a decision is made  Chief Complaint: Follow-up      Patient ID: Judith Coleman is a 74 y o  right handed male    HPI  Mr Atilio Molina returns for follow-up of his back pain and radiculopathy  He reports ongoing ongoing severe back pain  Radiculopathy improved since starting Gabapentin  However states pain radiates upwards from his lumbar spine  No new bowel or bladder problems  No new leg weakness  Reports ongoing hand and feet numbness  Review of Systems   HENT: Negative for tinnitus  Eyes: Negative for visual disturbance  Respiratory: Negative for shortness of breath and wheezing  Cardiovascular: Negative for chest pain  Gastrointestinal: Negative  Genitourinary: Negative  Musculoskeletal: Positive for back pain (usually lower back pain rt>lt, radiates into his upper back  used to radiate into his legs but that has subsided,), gait problem (uses a cane, history of falls, has no sense of balance), myalgias (once in a while will have muscle spams in the morning when getting out of bed going down his legs ) and neck pain  Neurological: Positive for weakness (bilateral legs ) and numbness (always in his bilateral arms/hands from nerve damage )  Negative for dizziness, tremors, seizures, syncope and headaches     Hematological: Bruises/bleeds easily  I have personally reviewed the MA's review of systems and made changes as necessary      The following portions of the patient's history were reviewed and updated as appropriate: allergies, current medications, past family history, past medical history, past social history, past surgical history and problem list       Active Ambulatory Problems     Diagnosis Date Noted    Myelomalacia ([facility] ) 08/01/2018    Other spondylosis with myelopathy, cervical region 08/01/2018    Hyponatremia 08/20/2018    Acute blood loss anemia 08/20/2018    Benign essential HTN 08/20/2018    Constipation 08/23/2018    History of cervical spinal surgery 08/23/2019    Discitis of lumbar region 05/13/2020    Asymptomatic microscopic hematuria 05/13/2020    Rib fractures 11/17/2020    Rib pain on left side 11/20/2020    Atrial fibrillation ([facility] ) 03/16/2021    Medication refill 11/15/2021    Gastroesophageal reflux disease without esophagitis 11/15/2021    Lumbar radiculopathy 07/01/2022     Resolved Ambulatory Problems     Diagnosis Date Noted    Spinal cord compression ([facility] [address] ) 08/01/2018    Slow transit constipation 08/20/2018    Left inguinal hernia      Past Medical History:   Diagnosis Date    A-fib [facility]) 01/01/2005    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 03/10/2017    Anemia 12/31/2015    Anxiety     Back ache 08/31/2017    Diarrhea 08/31/2017    Diverticula of colon 01/01/2012    Family history of prostate cancer     GERD (gastroesophageal reflux disease)     GERD with esophagitis 08/31/2017    Gout 08/31/2017    Hordeolum externum of lower eyelid 08/31/2017    Hyperlipidemia 05/01/2017    Hypertension, essential 05/01/2017    Loss of taste 05/01/2017    Muscle strain 08/31/2017    Osteoarthritis     Paresthesia 06/08/2018    Raised prostate specific antigen 05/01/2017    Skin lesion 03/08/2018    Spinal stenosis of lumbar region 02/06/2017    Steatosis of liver 08/31/2017    Weight loss, unintentional 08/31/2017       Past Surgical History:   Procedure Laterality Date    BACK SURGERY      CERVICAL SPINE SURGERY  08/2018    posterior cervical decompression/laminectomy C3-5, superior 6, fixation/fusion C2-T1    CHEST TUBE INSERTION  1980    Insertion and removal / collapsed lung    COLONOSCOPY  11/2018    5 yrs    OTHER SURGICAL HISTORY  03/10/2017    Abscess removed from L5 & S1    SD ARTHRODESIS POSTERIOR/POSTERIORLATERAL CERVICAL BELOW C2 N/A 8/16/2018    Procedure: Posterior cervical decompression/laminectomy C3-5, superior 6, fixation fusion C2- T1;  Surgeon: Lizet Nelson MD;  [facility];  Service: Neurosurgery    SD REPAIR ING HERNIA,5+Y/O,REDUCIBL Left 1/28/2021    Procedure: REPAIR HERNIA INGUINAL with mesh;  Surgeon: Morris Sanches MD;  Location:  MAIN OR;  Service: General       Current Outpatient Medications   Medication Sig Dispense Refill    allopurinol (ZYLOPRIM) 100 mg tablet TAKE 1 TABLET BY MOUTH EVERY DAY IN THE MORNING 90 tablet 3    amLODIPine (NORVASC) 5 mg tablet Take 1 tablet (5 mg total) by mouth daily 90 tablet 3    aspirin 325 mg tablet Take 325 mg by mouth every other day as needed for mild pain      gabapentin (NEURONTIN) 100 mg capsule Take 1 tablet at bedtime  May increase to 2 tablets after 3 days 60 capsule 1    ibuprofen (MOTRIN) 600 mg tablet Take 1 tablet (600 mg total) by mouth every 6 (six) hours as needed for mild pain or moderate pain 30 tablet 2    losartan (COZAAR) 100 MG tablet Take 1 tablet (100 mg total) by mouth daily 90 tablet 3    omeprazole (PriLOSEC) 20 mg delayed release capsule TAKE 1 CAPSULE (20 MG TOTAL) BY MOUTH AS NEEDED (GERD) 90 capsule 1    sotalol (BETAPACE) 120 mg tablet Take 1 tablet (120 mg total) by mouth 2 (two) times a day 180 tablet 3     No current facility-administered medications for this visit         Vitals:    08/08/22 1103   BP: (!) 176/82   Pulse: 60   Resp: 16   Temp: 98 4 °F (36 9 °C) Objective:    Physical Exam  Neurologic Exam    Results/Data:  I have reviewed the results and images from the MRI in detail with the patient

## 2022-08-10 RX ORDER — AMLODIPINE BESYLATE 5 MG/1
TABLET ORAL
Qty: 90 TABLET | Refills: 3 | Status: SHIPPED | OUTPATIENT
Start: 2022-08-10

## 2022-09-06 DIAGNOSIS — M10.00 IDIOPATHIC GOUT, UNSPECIFIED CHRONICITY, UNSPECIFIED SITE: ICD-10-CM

## 2022-09-06 RX ORDER — ALLOPURINOL 100 MG/1
TABLET ORAL
Qty: 90 TABLET | Refills: 3 | Status: SHIPPED | OUTPATIENT
Start: 2022-09-06

## 2022-09-22 DIAGNOSIS — K21.9 GASTROESOPHAGEAL REFLUX DISEASE WITHOUT ESOPHAGITIS: ICD-10-CM

## 2022-09-22 RX ORDER — OMEPRAZOLE 20 MG/1
20 CAPSULE, DELAYED RELEASE ORAL AS NEEDED
Qty: 90 CAPSULE | Refills: 1 | Status: SHIPPED | OUTPATIENT
Start: 2022-09-22 | End: 2022-12-21

## 2022-10-28 ENCOUNTER — OFFICE VISIT (OUTPATIENT)
Dept: NEUROSURGERY | Facility: CLINIC | Age: 67
End: 2022-10-28

## 2022-10-28 VITALS
HEIGHT: 70 IN | HEART RATE: 61 BPM | RESPIRATION RATE: 16 BRPM | DIASTOLIC BLOOD PRESSURE: 80 MMHG | SYSTOLIC BLOOD PRESSURE: 152 MMHG | WEIGHT: 124 LBS | BODY MASS INDEX: 17.75 KG/M2

## 2022-10-28 DIAGNOSIS — M54.16 LUMBAR RADICULOPATHY: Primary | ICD-10-CM

## 2022-10-28 NOTE — ASSESSMENT & PLAN NOTE
Presents for ongoing follow up of lumbar back pain  · Previously seen in consultation on 7/1 for low back pain  Recommended return in 3 months   · Was seen by Dr Gloria Rodriguez in the interrum and offered CT guided cyst aspiration/fenestration, but patient never returned call to schedule procedure  · History of discitis/osteomyelitis s/p epidural abscess resection with Dr Adolfo Ann at [facility] in 2017  · Improved radiculopathy with conservative measures  Persistent back pain  Imaging:  · MRI lumbar spine wo, 5/28/2022: There are no findings particularly suspicious for discitis as clinically questioned  Advanced degenerative disc disease noted at L4-5 with Modic type I degenerative marrow signal noted  Probable synovial facet cyst dorsally in the canal measures 8 x 6 x 5 mm contributing to severe central stenosis with moderate to severe facet hypertrophy bilaterally  Right greater than left foraminal narrowing noted  Mild degenerative changes are seen elsewhere  Plan:  · Reviewed imaging extensively with patient  · Discussed that he has evidence of extensive degenerative changes within lumbar spine particularly at L4-5 with associated synovial cyst   · Again discussed that despite his severe stenosis, he has limited leg symptoms presently  · Primary complaint at this time is low back pain  · Uses a cane for walking and believes gait contributes to back pain, but no therapy since 4/2022  · Previously followed with Dr Angeles Bonilla for pain management  · Patient has no focal weakness or sensation changes  · Would still consider cyst aspiration prior to surgical resection at this time, but would more likely to improve leg pain than back pain and patient is not interested in that at this time  · Patient verbalized understanding and is in agreement with plan  Plan to follow up as needed at this time

## 2022-10-28 NOTE — PROGRESS NOTES
Neurosurgery Office Note  Ramila Mclean 74 y o  male MRN: [id number]      Assessment/Plan     Lumbar radiculopathy  Presents for ongoing follow up of lumbar back pain  · Previously seen in consultation on 7/1 for low back pain  Recommended return in 3 months   · Was seen by Dr Wendy Johnson in the interrum and offered CT guided cyst aspiration/fenestration, but patient never returned call to schedule procedure  · History of discitis/osteomyelitis s/p epidural abscess resection with Dr Parvez Miranda at [facility] in 2017  · Improved radiculopathy with conservative measures  Persistent back pain  Imaging:  · MRI lumbar spine wo, 5/28/2022: There are no findings particularly suspicious for discitis as clinically questioned  Advanced degenerative disc disease noted at L4-5 with Modic type I degenerative marrow signal noted  Probable synovial facet cyst dorsally in the canal measures 8 x 6 x 5 mm contributing to severe central stenosis with moderate to severe facet hypertrophy bilaterally  Right greater than left foraminal narrowing noted  Mild degenerative changes are seen elsewhere  Plan:  · Reviewed imaging extensively with patient  · Discussed that he has evidence of extensive degenerative changes within lumbar spine particularly at L4-5 with associated synovial cyst   · Again discussed that despite his severe stenosis, he has limited leg symptoms presently  · Primary complaint at this time is low back pain  · Uses a cane for walking and believes gait contributes to back pain, but no therapy since 4/2022  · Previously followed with Dr Pamela Pena for pain management  · Patient has no focal weakness or sensation changes  · Would still consider cyst aspiration prior to surgical resection at this time, but would more likely to improve leg pain than back pain and patient is not interested in that at this time  · Patient verbalized understanding and is in agreement with plan  Plan to follow up as needed at this time  Diagnoses and all orders for this visit:    Lumbar radiculopathy          I spent 20 minutes with the patient today in which >50% of the time was spent counseling/coordination of care regarding diagnosis, imaging review, symptoms and treatment plan  CHIEF COMPLAINT    Chief Complaint   Patient presents with   • Follow-up       HISTORY    History of Present Illness     74y o  year old male who has severe pain due to a multifactorial process that includes disc herniation and bilateral facet arthrosis at L4-5 as well as superimposed synovial cyst  Patients leg pain continues to be improved with gabapentin  Patient was taking 100mg qHS and will contact PCP for a refill  He continues to use a cane for balance support secondary to back pain  No reported weakness or sensation changes in the legs  Again discussed previous conversations of open surgical resection of cyst vs CT guided laminotomy cyst fenestration  Patient advised that either surgical procedure would likely be aimed at improving his leg pain>back pain and his back pain is more of his concern  This pain is likely multifactorial and largely contributed by the facet arthrosis and degenerative changes in the lower lumbar spine  See Discussion    REVIEW OF SYSTEMS    Review of Systems   HENT: Negative for tinnitus  Eyes: Negative for visual disturbance  Respiratory: Negative for shortness of breath and wheezing  Cardiovascular: Negative for chest pain  Gastrointestinal: Negative  Genitourinary: Negative  Musculoskeletal: Positive for back pain (usually lower back pain rt>lt, radiates into his upper back  used to radiate into his legs but that has subsided,), gait problem (uses a cane, history of falls, has no sense of balance) and myalgias (once in a while will have muscle spams in the morning when getting out of bed going down his legs )  Negative for neck pain          Last PT - April/2022   Neurological: Positive for weakness (bilateral legs ) and numbness (always in his bilateral arms/hands from nerve damage )  Negative for dizziness, tremors, seizures, syncope and headaches  Hematological: Bruises/bleeds easily (asa [address])  Meds/Allergies     Current Outpatient Medications   Medication Sig Dispense Refill   • allopurinol (ZYLOPRIM) 100 mg tablet TAKE 1 TABLET BY MOUTH EVERY DAY IN THE MORNING 90 tablet 3   • amLODIPine (NORVASC) 5 mg tablet TAKE 1 TABLET BY MOUTH EVERY DAY 90 tablet 3   • aspirin 325 mg tablet Take 325 mg by mouth every other day as needed for mild pain     • ibuprofen (MOTRIN) 600 mg tablet Take 1 tablet (600 mg total) by mouth every 6 (six) hours as needed for mild pain or moderate pain 30 tablet 2   • losartan (COZAAR) 100 MG tablet Take 1 tablet (100 mg total) by mouth daily 90 tablet 3   • omeprazole (PriLOSEC) 20 mg delayed release capsule TAKE 1 CAPSULE (20 MG TOTAL) BY MOUTH AS NEEDED (GERD) 90 capsule 1   • sotalol (BETAPACE) 120 mg tablet Take 1 tablet (120 mg total) by mouth 2 (two) times a day 180 tablet 3     No current facility-administered medications for this visit         No Known Allergies    PAST HISTORY    Past Medical History:   Diagnosis Date   • [facility] 01/01/2005   • 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 03/10/2017   • Anemia 12/31/2015   • Anxiety    • Back ache 08/31/2017   • Diarrhea 08/31/2017   • Diverticula of colon 01/01/2012   • Family history of prostate cancer     Father   • GERD (gastroesophageal reflux disease)    • GERD with esophagitis 08/31/2017   • Gout 08/31/2017   • Hordeolum externum of lower eyelid 08/31/2017   • Hyperlipidemia 05/01/2017   • Hypertension, essential 05/01/2017   • Hyponatremia 07/10/2014   • Loss of taste 05/01/2017   • Muscle strain 08/31/2017   • Osteoarthritis    • Paresthesia 06/08/2018   • Raised prostate specific antigen 05/01/2017   • Skin lesion 03/08/2018   • Spinal stenosis of lumbar region 02/06/2017   • Steatosis of liver 08/31/2017 • Weight loss, unintentional 2017       Past Surgical History:   Procedure Laterality Date   • BACK SURGERY     • CERVICAL SPINE SURGERY  2018    posterior cervical decompression/laminectomy C3-5, superior 6, fixation/fusion C2-T1   • CHEST TUBE INSERTION  1980    Insertion and removal / collapsed lung   • COLONOSCOPY  2018    5 yrs   • OTHER SURGICAL HISTORY  03/10/2017    Abscess removed from L5 & S1   • AK ARTHRODESIS POSTERIOR/POSTERIORLATERAL CERVICAL BELOW C2 N/A 2018    Procedure: Posterior cervical decompression/laminectomy C3-5, superior 6, fixation fusion C2- T1;  Surgeon: Tacho Schwarz MD;  [facility];  Service: Neurosurgery   • AK REPAIR [address] HERNIA,5+Y/O,REDUCIBL Left 2021    Procedure: REPAIR HERNIA INGUINAL with mesh;  Surgeon: Brady Amato MD;  Location:  MAIN OR;  Service: General       Social History     Tobacco Use   • Smoking status: Former Smoker     Types: Cigarettes     Quit date:      Years since quittin 8   • Smokeless tobacco: Never Used   Vaping Use   • Vaping Use: Never used   Substance Use Topics   • Alcohol use: Yes     Alcohol/week: 5 0 standard drinks     Types: 5 Cans of beer per week     Comment: Beer/ 5 daily   • Drug use: No       Family History   Problem Relation Age of Onset   • Prostate cancer Father    • Kidney disease Father    • Pancreatic cancer Mother          Above history personally reviewed  EXAM    Vitals:Blood pressure 152/80, pulse 61, resp  rate 16, height 5' 10" (1 778 m), weight 56 2 kg (124 lb)  ,Body mass index is 17 79 kg/m²  Physical Exam  Constitutional:       Appearance: Normal appearance  HENT:      Head: Atraumatic  Eyes:      Extraocular Movements: Extraocular movements intact and EOM normal    Cardiovascular:      Rate and Rhythm: Normal rate  Pulmonary:      Effort: Pulmonary effort is normal    Musculoskeletal:         General: Tenderness (generalized  paraspinal and midline   si joint ) present  Normal range of motion  Cervical back: Neck supple  No tenderness  Neurological:      Mental Status: He is alert and oriented to person, place, and time  Sensory: No sensory deficit  Motor: No weakness  Gait: Gait is intact  Deep Tendon Reflexes:      Reflex Scores:       Patellar reflexes are 2+ on the right side and 2+ on the left side  Psychiatric:         Mood and Affect: Mood normal          Speech: Speech normal          Thought Content: Thought content normal          Neurologic Exam     Mental Status   Oriented to person, place, and time  Attention: normal    Speech: speech is normal   Level of consciousness: alert  Knowledge: good  Normal comprehension  Cranial Nerves     CN III, IV, VI   Extraocular motions are normal    Upgaze: normal  Downgaze: normal    CN VII   Facial expression full, symmetric  CN VIII   CN VIII normal    Hearing: intact    Motor Exam   Muscle bulk: normal  Right arm tone: normal  Left arm tone: normal  Right leg tone: normal  Left leg tone: normal    Strength   Right deltoid: 5/5  Left deltoid: 5/5  Right biceps: 5/5  Left biceps: 5/5  Right triceps: 5/5  Left triceps: 5/5  Right wrist flexion: 5/5  Left wrist flexion: 5/5  Right wrist extension: 5/5  Left wrist extension: 5/5  Right iliopsoas: 5/5  Left iliopsoas: 5/5  Right quadriceps: 5/5  Left quadriceps: 5/5  Right hamstrin/5  Left hamstrin/5  Right anterior tibial: 5/5  Left anterior tibial: 5/5  Right gastroc: 5/5  Left gastroc: 5/5    Sensory Exam   Light touch normal      Gait, Coordination, and Reflexes     Gait  Gait: normal (uses a cane for balance support)    Tremor   Resting tremor: absent  Action tremor: absent    Reflexes   Right patellar: 2+  Left patellar: 2+    MEDICAL DECISION MAKING    Imaging Studies:     No results found  I have personally reviewed pertinent reports     and I have personally reviewed pertinent films in PACS

## 2022-11-10 DIAGNOSIS — I10 BENIGN ESSENTIAL HTN: ICD-10-CM

## 2022-11-10 DIAGNOSIS — I48.91 ATRIAL FIBRILLATION, UNSPECIFIED TYPE (HCC): ICD-10-CM

## 2022-11-11 RX ORDER — LOSARTAN POTASSIUM 100 MG/1
TABLET ORAL
Qty: 90 TABLET | Refills: 3 | Status: SHIPPED | OUTPATIENT
Start: 2022-11-11

## 2022-11-11 RX ORDER — SOTALOL HYDROCHLORIDE 120 MG/1
TABLET ORAL
Qty: 180 TABLET | Refills: 3 | Status: SHIPPED | OUTPATIENT
Start: 2022-11-11

## 2023-08-10 DIAGNOSIS — I10 BENIGN ESSENTIAL HTN: ICD-10-CM

## 2023-08-10 DIAGNOSIS — I48.91 ATRIAL FIBRILLATION, UNSPECIFIED TYPE (HCC): ICD-10-CM

## 2023-08-10 DIAGNOSIS — Z12.5 PROSTATE CANCER SCREENING: ICD-10-CM

## 2023-08-10 DIAGNOSIS — E55.9 VITAMIN D INSUFFICIENCY: ICD-10-CM

## 2023-08-10 DIAGNOSIS — R73.9 ELEVATED BLOOD SUGAR: ICD-10-CM

## 2023-08-10 DIAGNOSIS — Z13.220 SCREENING CHOLESTEROL LEVEL: ICD-10-CM

## 2023-08-10 DIAGNOSIS — Z13.29 SCREENING FOR THYROID DISORDER: ICD-10-CM

## 2023-08-10 DIAGNOSIS — Z13.89 SCREENING FOR BLOOD OR PROTEIN IN URINE: ICD-10-CM

## 2023-08-10 DIAGNOSIS — E87.1 HYPONATREMIA: Primary | ICD-10-CM

## 2023-08-10 RX ORDER — AMLODIPINE BESYLATE 5 MG/1
TABLET ORAL
Qty: 90 TABLET | Refills: 3 | OUTPATIENT
Start: 2023-08-10

## 2023-08-11 RX ORDER — SOTALOL HYDROCHLORIDE 120 MG/1
120 TABLET ORAL 2 TIMES DAILY
Qty: 180 TABLET | Refills: 3 | OUTPATIENT
Start: 2023-08-11

## 2023-10-25 ENCOUNTER — APPOINTMENT (OUTPATIENT)
Dept: LAB | Facility: HOSPITAL | Age: 68
End: 2023-10-25
Payer: MEDICARE

## 2023-10-25 DIAGNOSIS — Z13.220 SCREENING CHOLESTEROL LEVEL: ICD-10-CM

## 2023-10-25 DIAGNOSIS — I10 BENIGN ESSENTIAL HTN: ICD-10-CM

## 2023-10-25 DIAGNOSIS — Z12.5 PROSTATE CANCER SCREENING: ICD-10-CM

## 2023-10-25 DIAGNOSIS — E87.1 HYPONATREMIA: ICD-10-CM

## 2023-10-25 DIAGNOSIS — R73.9 ELEVATED BLOOD SUGAR: ICD-10-CM

## 2023-10-25 DIAGNOSIS — I48.91 ATRIAL FIBRILLATION, UNSPECIFIED TYPE (HCC): ICD-10-CM

## 2023-10-25 DIAGNOSIS — E55.9 VITAMIN D INSUFFICIENCY: ICD-10-CM

## 2023-10-25 DIAGNOSIS — Z13.29 SCREENING FOR THYROID DISORDER: ICD-10-CM

## 2023-10-25 LAB
25(OH)D3 SERPL-MCNC: 28.8 NG/ML (ref 30–100)
ALBUMIN SERPL BCP-MCNC: 4.1 G/DL (ref 3.5–5)
ALP SERPL-CCNC: 89 U/L (ref 34–104)
ALT SERPL W P-5'-P-CCNC: 15 U/L (ref 7–52)
ANION GAP SERPL CALCULATED.3IONS-SCNC: 8 MMOL/L
AST SERPL W P-5'-P-CCNC: 23 U/L (ref 13–39)
BILIRUB SERPL-MCNC: 0.77 MG/DL (ref 0.2–1)
BILIRUB UR QL STRIP: NEGATIVE
BUN SERPL-MCNC: 8 MG/DL (ref 5–25)
CALCIUM SERPL-MCNC: 9.7 MG/DL (ref 8.4–10.2)
CHLORIDE SERPL-SCNC: 90 MMOL/L (ref 96–108)
CHOLEST SERPL-MCNC: 153 MG/DL
CLARITY UR: CLEAR
CO2 SERPL-SCNC: 27 MMOL/L (ref 21–32)
COLOR UR: YELLOW
CREAT SERPL-MCNC: 0.73 MG/DL (ref 0.6–1.3)
CREAT UR-MCNC: 103.4 MG/DL
ERYTHROCYTE [DISTWIDTH] IN BLOOD BY AUTOMATED COUNT: 12.8 % (ref 11.6–15.1)
EST. AVERAGE GLUCOSE BLD GHB EST-MCNC: 88 MG/DL
GFR SERPL CREATININE-BSD FRML MDRD: 96 ML/MIN/1.73SQ M
GLUCOSE P FAST SERPL-MCNC: 103 MG/DL (ref 65–99)
GLUCOSE UR STRIP-MCNC: NEGATIVE MG/DL
HBA1C MFR BLD: 4.7 %
HCT VFR BLD AUTO: 37 % (ref 36.5–49.3)
HDLC SERPL-MCNC: 73 MG/DL
HGB BLD-MCNC: 12.8 G/DL (ref 12–17)
HGB UR QL STRIP.AUTO: NEGATIVE
KETONES UR STRIP-MCNC: NEGATIVE MG/DL
LDLC SERPL CALC-MCNC: 65 MG/DL (ref 0–100)
LEUKOCYTE ESTERASE UR QL STRIP: NEGATIVE
MCH RBC QN AUTO: 32.8 PG (ref 26.8–34.3)
MCHC RBC AUTO-ENTMCNC: 34.6 G/DL (ref 31.4–37.4)
MCV RBC AUTO: 95 FL (ref 82–98)
MICROALBUMIN UR-MCNC: <7 MG/L
MICROALBUMIN/CREAT 24H UR: <7 MG/G CREATININE (ref 0–30)
NITRITE UR QL STRIP: NEGATIVE
NONHDLC SERPL-MCNC: 80 MG/DL
PH UR STRIP.AUTO: 8 [PH]
PLATELET # BLD AUTO: 192 THOUSANDS/UL (ref 149–390)
PMV BLD AUTO: 9.8 FL (ref 8.9–12.7)
POTASSIUM SERPL-SCNC: 4.3 MMOL/L (ref 3.5–5.3)
PROT SERPL-MCNC: 7.1 G/DL (ref 6.4–8.4)
PROT UR STRIP-MCNC: NEGATIVE MG/DL
PSA SERPL-MCNC: 3.18 NG/ML (ref 0–4)
RBC # BLD AUTO: 3.9 MILLION/UL (ref 3.88–5.62)
SODIUM SERPL-SCNC: 125 MMOL/L (ref 135–147)
SP GR UR STRIP.AUTO: 1.01 (ref 1–1.03)
TRIGL SERPL-MCNC: 73 MG/DL
TSH SERPL DL<=0.05 MIU/L-ACNC: 1.29 UIU/ML (ref 0.45–4.5)
UROBILINOGEN UR QL STRIP.AUTO: 0.2 E.U./DL
WBC # BLD AUTO: 5.98 THOUSAND/UL (ref 4.31–10.16)

## 2023-10-25 PROCEDURE — 80061 LIPID PANEL: CPT

## 2023-10-25 PROCEDURE — 83036 HEMOGLOBIN GLYCOSYLATED A1C: CPT

## 2023-10-25 PROCEDURE — 80053 COMPREHEN METABOLIC PANEL: CPT

## 2023-10-25 PROCEDURE — 85027 COMPLETE CBC AUTOMATED: CPT

## 2023-10-25 PROCEDURE — 82306 VITAMIN D 25 HYDROXY: CPT

## 2023-10-25 PROCEDURE — G0103 PSA SCREENING: HCPCS

## 2023-10-25 PROCEDURE — 84443 ASSAY THYROID STIM HORMONE: CPT

## 2023-10-25 PROCEDURE — 36415 COLL VENOUS BLD VENIPUNCTURE: CPT

## 2023-10-30 ENCOUNTER — OFFICE VISIT (OUTPATIENT)
Dept: FAMILY MEDICINE CLINIC | Facility: CLINIC | Age: 68
End: 2023-10-30
Payer: MEDICARE

## 2023-10-30 VITALS
WEIGHT: 126 LBS | SYSTOLIC BLOOD PRESSURE: 138 MMHG | BODY MASS INDEX: 18.04 KG/M2 | DIASTOLIC BLOOD PRESSURE: 82 MMHG | OXYGEN SATURATION: 99 % | TEMPERATURE: 98.4 F | HEIGHT: 70 IN | RESPIRATION RATE: 16 BRPM | HEART RATE: 65 BPM

## 2023-10-30 DIAGNOSIS — M54.50 CHRONIC BILATERAL LOW BACK PAIN WITHOUT SCIATICA: ICD-10-CM

## 2023-10-30 DIAGNOSIS — I48.91 ATRIAL FIBRILLATION, UNSPECIFIED TYPE (HCC): ICD-10-CM

## 2023-10-30 DIAGNOSIS — Z79.899 MEDICATION MANAGEMENT: ICD-10-CM

## 2023-10-30 DIAGNOSIS — E87.1 HYPONATREMIA: ICD-10-CM

## 2023-10-30 DIAGNOSIS — G89.29 CHRONIC BILATERAL LOW BACK PAIN WITHOUT SCIATICA: ICD-10-CM

## 2023-10-30 DIAGNOSIS — Z71.2 ENCOUNTER TO DISCUSS TEST RESULTS: ICD-10-CM

## 2023-10-30 DIAGNOSIS — I10 BENIGN ESSENTIAL HTN: Primary | ICD-10-CM

## 2023-10-30 DIAGNOSIS — M10.00 IDIOPATHIC GOUT, UNSPECIFIED CHRONICITY, UNSPECIFIED SITE: ICD-10-CM

## 2023-10-30 DIAGNOSIS — I10 BENIGN ESSENTIAL HTN: ICD-10-CM

## 2023-10-30 DIAGNOSIS — Z79.899 ENCOUNTER FOR LONG-TERM (CURRENT) USE OF MEDICATIONS: ICD-10-CM

## 2023-10-30 DIAGNOSIS — E44.1 MILD PROTEIN-CALORIE MALNUTRITION (HCC): ICD-10-CM

## 2023-10-30 PROCEDURE — 99215 OFFICE O/P EST HI 40 MIN: CPT | Performed by: FAMILY MEDICINE

## 2023-10-30 PROCEDURE — G0439 PPPS, SUBSEQ VISIT: HCPCS | Performed by: FAMILY MEDICINE

## 2023-10-30 RX ORDER — SODIUM CHLORIDE 1 G/1
1 TABLET ORAL 3 TIMES DAILY
Qty: 90 TABLET | Refills: 6 | Status: SHIPPED | OUTPATIENT
Start: 2023-10-30

## 2023-10-30 RX ORDER — LOSARTAN POTASSIUM 100 MG/1
100 TABLET ORAL DAILY
Qty: 90 TABLET | Refills: 3 | Status: SHIPPED | OUTPATIENT
Start: 2023-10-30 | End: 2024-10-29

## 2023-10-30 RX ORDER — GABAPENTIN 100 MG/1
100 CAPSULE ORAL 3 TIMES DAILY
Qty: 30 CAPSULE | Refills: 3 | Status: SHIPPED | OUTPATIENT
Start: 2023-10-30

## 2023-10-30 RX ORDER — SOTALOL HYDROCHLORIDE 120 MG/1
120 TABLET ORAL 2 TIMES DAILY
Qty: 180 TABLET | Refills: 3 | Status: SHIPPED | OUTPATIENT
Start: 2023-10-30 | End: 2024-10-29

## 2023-10-30 RX ORDER — AMLODIPINE BESYLATE 5 MG/1
5 TABLET ORAL DAILY
Qty: 90 TABLET | Refills: 3 | Status: SHIPPED | OUTPATIENT
Start: 2023-10-30 | End: 2024-10-29

## 2023-10-30 NOTE — PATIENT INSTRUCTIONS
Medicare Preventive Visit Patient Instructions  Thank you for completing your Welcome to Medicare Visit or Medicare Annual Wellness Visit today. Your next wellness visit will be due in one year (10/30/2024). The screening/preventive services that you may require over the next 5-10 years are detailed below. Some tests may not apply to you based off risk factors and/or age. Screening tests ordered at today's visit but not completed yet may show as past due. Also, please note that scanned in results may not display below. Preventive Screenings:  Service Recommendations Previous Testing/Comments   Colorectal Cancer Screening  Colonoscopy    Fecal Occult Blood Test (FOBT)/Fecal Immunochemical Test (FIT)  Fecal DNA/Cologuard Test  Flexible Sigmoidoscopy Age: 43-73 years old   Colonoscopy: every 10 years (May be performed more frequently if at higher risk)  OR  FOBT/FIT: every 1 year  OR  Cologuard: every 3 years  OR  Sigmoidoscopy: every 5 years  Screening may be recommended earlier than age 39 if at higher risk for colorectal cancer. Also, an individualized decision between you and your healthcare provider will decide whether screening between the ages of 77-80 would be appropriate.  Colonoscopy: 11/01/2018  FOBT/FIT: Not on file  Cologuard: Not on file  Sigmoidoscopy: Not on file    Screening Current     Prostate Cancer Screening Individualized decision between patient and health care provider in men between ages of 53-66   Medicare will cover every 12 months beginning on the day after your 50th birthday PSA: 3.18 ng/mL     Screening Current     Hepatitis C Screening Once for adults born between 1945 and 1965  More frequently in patients at high risk for Hepatitis C Hep C Antibody: 01/13/2021    Screening Current   Diabetes Screening 1-2 times per year if you're at risk for diabetes or have pre-diabetes Fasting glucose: 103 mg/dL (10/25/2023)  A1C: 4.7 % (10/25/2023)  Screening Current   Cholesterol Screening Once every 5 years if you don't have a lipid disorder. May order more often based on risk factors. Lipid panel: 10/25/2023  Screening Current      Other Preventive Screenings Covered by Medicare:  Abdominal Aortic Aneurysm (AAA) Screening: covered once if your at risk. You're considered to be at risk if you have a family history of AAA or a male between the age of 70-76 who smoking at least 100 cigarettes in your lifetime. Lung Cancer Screening: covers low dose CT scan once per year if you meet all of the following conditions: (1) Age 48-67; (2) No signs or symptoms of lung cancer; (3) Current smoker or have quit smoking within the last 15 years; (4) You have a tobacco smoking history of at least 20 pack years (packs per day x number of years you smoked); (5) You get a written order from a healthcare provider. Glaucoma Screening: covered annually if you're considered high risk: (1) You have diabetes OR (2) Family history of glaucoma OR (3)  aged 48 and older OR (3)  American aged 72 and older  Osteoporosis Screening: covered every 2 years if you meet one of the following conditions: (1) Have a vertebral abnormality; (2) On glucocorticoid therapy for more than 3 months; (3) Have primary hyperparathyroidism; (4) On osteoporosis medications and need to assess response to drug therapy. HIV Screening: covered annually if you're between the age of 14-79. Also covered annually if you are younger than 13 and older than 72 with risk factors for HIV infection. For pregnant patients, it is covered up to 3 times per pregnancy.     Immunizations:  Immunization Recommendations   Influenza Vaccine Annual influenza vaccination during flu season is recommended for all persons aged >= 6 months who do not have contraindications   Pneumococcal Vaccine   * Pneumococcal conjugate vaccine = PCV13 (Prevnar 13), PCV15 (Vaxneuvance), PCV20 (Prevnar 20)  * Pneumococcal polysaccharide vaccine = PPSV23 (Pneumovax) Adults 99-21 yo with certain risk factors or if 69+ yo  If never received any pneumonia vaccine: recommend Prevnar 21 (PCV20)  Give PCV20 if previously received 1 dose of PCV13 or PPSV23   Hepatitis B Vaccine 3 dose series if at intermediate or high risk (ex: diabetes, end stage renal disease, liver disease)   Respiratory syncytial virus (RSV) Vaccine - COVERED BY MEDICARE PART D  * RSVPreF3 (Arexvy) CDC recommends that adults 61years of age and older may receive a single dose of RSV vaccine using shared clinical decision-making (SCDM)   Tetanus (Td) Vaccine - COST NOT COVERED BY MEDICARE PART B Following completion of primary series, a booster dose should be given every 10 years to maintain immunity against tetanus. Td may also be given as tetanus wound prophylaxis. Tdap Vaccine - COST NOT COVERED BY MEDICARE PART B Recommended at least once for all adults. For pregnant patients, recommended with each pregnancy. Shingles Vaccine (Shingrix) - COST NOT COVERED BY MEDICARE PART B  2 shot series recommended in those 23 years and older who have or will have weakened immune systems or those 48 years and older     Health Maintenance Due:      Topic Date Due   • Colorectal Cancer Screening  11/01/2028   • Hepatitis C Screening  Completed     Immunizations Due:      Topic Date Due   • COVID-19 Vaccine (1) Never done   • Pneumococcal Vaccine: 65+ Years (1 - PCV) Never done   • Influenza Vaccine (1) Never done     Advance Directives   What are advance directives? Advance directives are legal documents that state your wishes and plans for medical care. These plans are made ahead of time in case you lose your ability to make decisions for yourself. Advance directives can apply to any medical decision, such as the treatments you want, and if you want to donate organs. What are the types of advance directives? There are many types of advance directives, and each state has rules about how to use them.  You may choose a combination of any of the following:  Living will: This is a written record of the treatment you want. You can also choose which treatments you do not want, which to limit, and which to stop at a certain time. This includes surgery, medicine, IV fluid, and tube feedings. Durable power of  for healthcare [facility]): This is a written record that states who you want to make healthcare choices for you when you are unable to make them for yourself. This person, called a proxy, is usually a family member or a friend. You may choose more than 1 proxy. Do not resuscitate (DNR) order:  A DNR order is used in case your heart stops beating or you stop breathing. It is a request not to have certain forms of treatment, such as CPR. A DNR order may be included in other types of advance directives. Medical directive: This covers the care that you want if you are in a coma, near death, or unable to make decisions for yourself. You can list the treatments you want for each condition. Treatment may include pain medicine, surgery, blood transfusions, dialysis, IV or tube feedings, and a ventilator (breathing machine). Values history: This document has questions about your views, beliefs, and how you feel and think about life. This information can help others choose the care that you would choose. Why are advance directives important? An advance directive helps you control your care. Although spoken wishes may be used, it is better to have your wishes written down. Spoken wishes can be misunderstood, or not followed. Treatments may be given even if you do not want them. An advance directive may make it easier for your family to make difficult choices about your care. Fall Prevention    Fall prevention  includes ways to make your home and other areas safer. It also includes ways you can move more carefully to prevent a fall.  Health conditions that cause changes in your blood pressure, vision, or muscle strength and coordination may increase your risk for falls. Medicines may also increase your risk for falls if they make you dizzy, weak, or sleepy. Fall prevention tips:   Stand or sit up slowly. Use assistive devices as directed. Wear shoes that fit well and have soles that . Wear a personal alarm. Stay active. Manage your medical conditions. Home Safety Tips:  Add items to prevent falls in the bathroom. Keep paths clear. Install bright lights in your home. Keep items you use often on shelves within reach. Paint or place reflective tape on the edges of your stairs. Underweight  Underweight is defined as having a body mass index (BMI) of less than 18.5 kg/m2   Anorexia  is a loss of appetite, decreased food intake, or both. Your appetite naturally decreases as you get older. You also get full faster than you used to. This occurs because your body needs less energy. Other body changes can also lead to a decreased appetite. Even though some appetite loss is normal, you still need to get enough calories and nutrients to keep you healthy. You can start to lose too much weight if you do not eat as much food as your body needs. Unwanted weight loss can cause health problems, or worsen health problems you already have. You can also become dehydrated if you do not drink enough liquid. How to eat healthy and get enough nutrients:   Choose healthy foods. Eat a variety of fruits, vegetables, whole grains, low-fat dairy foods, lean meats, and other protein foods. Limit foods high in fat, sugar, and salt. Limit or avoid alcohol as directed. Work with a dietitian to help you plan your meals if you need to follow a special diet. A dietitian can also teach you how to modify foods if you have trouble chewing or swallowing. Snack on healthy foods between meals  if you only eat a small amount during meals. Snacks provide extra healthy nutrients and calories between meals.  Examples include fruit, cheese, and whole grain crackers. Drink liquids as directed  to avoid dehydration. Drink liquids between meals if they cause you to get full too quickly during meals. Ask how much liquid to drink each day and which liquids are best for you. Use herbs, spices, and flavor enhancers to add flavor to foods. Avoid using herbs and spice blends that also contain sodium. Ask your healthcare provider or dietitian about flavor enhancers. Flavor enhancers with ham, natural de souza, and roast beef flavors can also be sprinkled on food to add flavor. Share meals with others as often as you can. Eating with others may help you to eat better during meal time. Ask family members, neighbors, or friends to join you for lunch. There are also senior centers where you can meet people, and share meals with them. Ask family and friends for help  with shopping or preparing foods. Ask for a ride to the grocery store, if needed. © Copyright [employer] 2018 Information is for End User's use only and may not be sold, redistributed or otherwise used for commercial purposes.  All illustrations and images included in CareNotes® are the copyrighted property of [employer].D.A.M., Inc. or  [address]

## 2023-10-30 NOTE — PROGRESS NOTES
Assessment/Plan:       1. Benign essential HTN  Comments:  Blood pressure controlled on losartan and amlodipine at 138/82  Orders:  -     amLODIPine (NORVASC) 5 mg tablet; Take 1 tablet (5 mg total) by mouth daily  -     losartan (COZAAR) 100 MG tablet; Take 1 tablet (100 mg total) by mouth daily    2. Atrial fibrillation, unspecified type ([address])  Comments:  Last a fib  conversion 2008. Has had 3 bouts of chem cardioversion in ER  Orders:  -     sotalol (BETAPACE) 120 mg tablet; Take 1 tablet (120 mg total) by mouth 2 (two) times a day    3. Benign essential HTN  Comments:  I am VERY concerned about his BP--190/100 in R arm and 200/100 in L arm. Pt is talland thin. Very hi BP. Add: Norvasc 5 mg OD  Orders:  -     amLODIPine (NORVASC) 5 mg tablet; Take 1 tablet (5 mg total) by mouth daily  -     losartan (COZAAR) 100 MG tablet; Take 1 tablet (100 mg total) by mouth daily    4. Benign essential HTN  -     amLODIPine (NORVASC) 5 mg tablet; Take 1 tablet (5 mg total) by mouth daily  -     losartan (COZAAR) 100 MG tablet; Take 1 tablet (100 mg total) by mouth daily    5. Atrial fibrillation, unspecified type (HCC)  -     sotalol (BETAPACE) 120 mg tablet; Take 1 tablet (120 mg total) by mouth 2 (two) times a day    6. Mild protein-calorie malnutrition ([address])  Comments:  Drinking boost +1    7. Idiopathic gout, unspecified chronicity, unspecified site  Comments:  Self discontinued allopurinol 100 mg many months ago doing well    8. Encounter for long-term (current) use of medications    9. Encounter to discuss test results  Comments: All labs reviewed from last week note sodium 125    10. Medication management    11. Chronic bilateral low back pain without sciatica  Comments:  Would like to resume gabapentin I have ordered 100 mg 3 times daily to start once daily and escalate  Orders:  -     gabapentin (Neurontin) 100 mg capsule; Take 1 capsule (100 mg total) by mouth 3 (three) times a day    12. Hyponatremia  Comments:  See labs -patient always has low sodium currently 125--caution not to let it drop below 120. Add sodium chloride tablets  Orders:  -     sodium chloride 1 g tablet; Take 1 tablet (1 g total) by mouth 3 (three) times a day        Continue vitamin D 2,000 International Units twice a day for a year. Refilled amlodipine, losartan, and sotalol. Recheck sodium level in a month. Return in 6 months. Depression Screening and Follow-up Plan: Patient was screened for depression during today's encounter. They screened negative with a PHQ-2 score of 0. Falls Plan of Care: balance, strength, and gait training instructions were provided. Home safety evaluation by OT recommended. Home safety education provided. Subjective:      Patient ID: Isis Mckeon is a 79 y.o. male presents for a Medicare Wellness Visit. He has not experienced any gout issues recently. He still has 3 bottles of allopurinol, but he discontinued taking it. Patient previously had 3 episodes of atrial fibrillation, which necessitated cardioversion. He has not encountered atrial fibrillation since taking sotalol 120 mg daily. He consulted pain management for his low back pain that radiates to the left leg. He was prescribed gabapentin for 30 days only. He is inquiring if it is acceptable to resume gabapentin. He experienced a fall on 03/28/2022 resulting in an ankle sprain. He has consumed Boost Plus in the past.    He used to be a smoker but quit more than 15 years ago. Currently, he drinks alcohol. Patient takes vitamin D and calcium. He takes omeprazole twice a week. He denies taking salt tablets. He is requesting refills for sotalol, losartan, and amlodipine. He has not received the flu vaccine. Results:  Labs 10/25/2023. His vitamin D level has decreased to 28 ng/mL, whereas the previous reading was 42 ng/mL.   The sodium level is at 125 mEq/L, and it was 126 mEq/L a year and a half ago.    The following portions of the patient's history were reviewed and updated as appropriate: allergies, current medications, past family history, past medical history, past social history, past surgical history, and problem list.    Review of Systems   Constitutional:  Negative for activity change, appetite change, chills, diaphoresis, fatigue, fever and unexpected weight change. HENT:  Negative for congestion, dental problem, drooling, ear discharge, ear pain, facial swelling, mouth sores, nosebleeds, postnasal drip, rhinorrhea, trouble swallowing and voice change. Eyes:  Negative for photophobia, pain, discharge, redness, itching and visual disturbance. Respiratory:  Negative for apnea, cough, choking, chest tightness and shortness of breath. Denies any and all respiratory issues - SOB, orthopnea, etc.   Cardiovascular:  Negative for chest pain and leg swelling. Denies any and all cardiac symptoms now, but in 2008 Dr. Vidhya Arndt put on Sotalol 120 mg BID for a fib. Letta Curd No issue since then. Gastrointestinal:  Negative for abdominal distention, abdominal pain, constipation, diarrhea and nausea. Endocrine: Negative for polydipsia, polyphagia and polyuria. Genitourinary:  Negative for decreased urine volume, difficulty urinating, dysuria, enuresis and hematuria. Musculoskeletal:  Negative for arthralgias, gait problem and joint swelling. R butt pain and back pain, onset 3 wks, and shoveled snow. Had surg in 2017 for lumbar abcess. Skin:  Negative for color change, pallor, rash and wound. Allergic/Immunologic: Negative for immunocompromised state. Neurological:  Negative for dizziness, seizures, syncope, facial asymmetry, speech difficulty, light-headedness and headaches. Hematological:  Negative for adenopathy. Psychiatric/Behavioral:  Negative for agitation, behavioral problems, confusion and decreased concentration.             Objective:  /82 (BP Location: Left arm, Patient Position: Sitting, Cuff Size: Standard)   Pulse 65   Temp 98.4 °F (36.9 °C) (Temporal)   Resp 16   Ht 5' 10" (1.778 m)   Wt 57.2 kg (126 lb)   SpO2 99%   BMI 18.08 kg/m²          Physical Exam  Vitals and nursing note reviewed. Constitutional:       General: He is not in acute distress. Appearance: Normal appearance. He is well-developed and normal weight. He is not ill-appearing or diaphoretic. HENT:      Head: Normocephalic and atraumatic. Nose: Nose normal.   Eyes:      Pupils: Pupils are equal, round, and reactive to light. Neck:      Trachea: No tracheal deviation. Cardiovascular:      Rate and Rhythm: Normal rate and regular rhythm. Heart sounds: Normal heart sounds. Pulmonary:      Effort: Pulmonary effort is normal. No respiratory distress. Breath sounds: Normal breath sounds. No wheezing or rhonchi. Abdominal:      General: Bowel sounds are normal.      Palpations: Abdomen is soft. Hernia: No hernia (had repaired on Jan 28, 2021- Dr. Lydia Hernandez. ) is present. Musculoskeletal:         General: Normal range of motion. Cervical back: Normal range of motion and neck supple. No rigidity. No muscular tenderness. Lymphadenopathy:      Cervical: No cervical adenopathy. Skin:     General: Skin is warm and dry. Coloration: Skin is not jaundiced or pale. Findings: No erythema or rash. Neurological:      General: No focal deficit present. Mental Status: He is alert and oriented to person, place, and time. Psychiatric:         Mood and Affect: Mood normal.         Behavior: Behavior normal.         Thought Content: Thought content normal.         Judgment: Judgment normal.           I personally reviewed the recent (and prior)  lab results, the image studies, pathology, other specialty/physicians consult notes and recommendations, and outside medical records from other institutions, as appropriate.  I had a lengthy discussion with the patient and shared the work-up findings. We discussed the diagnosis and management plan. I spent  35  minutes reviewing the records (labs, clinician notes, outside records, medical history, ordering medicine/tests/procedures, interpreting the imaging/labs previously done) and coordination of care as well as direct time with the patient today, of which greater than 50% of the time was spent in counseling and coordination of care with the patient/family. Transcribed for Elo Spring DO, by Elias Sutherland on 11/05/23 at 9:44 PM. Powered by [employer].

## 2023-10-30 NOTE — PROGRESS NOTES
Assessment and Plan:     Problem List Items Addressed This Visit        Cardiovascular and Mediastinum    Benign essential HTN - Primary    Relevant Medications    amLODIPine (NORVASC) 5 mg tablet    losartan (COZAAR) 100 MG tablet    sotalol (BETAPACE) 120 mg tablet    Atrial fibrillation (HCC)    Relevant Medications    amLODIPine (NORVASC) 5 mg tablet    sotalol (BETAPACE) 120 mg tablet       Other    Hyponatremia    Relevant Medications    sodium chloride 1 g tablet    Mild protein-calorie malnutrition (HCC)   Other Visit Diagnoses     Idiopathic gout, unspecified chronicity, unspecified site        Self discontinued allopurinol 100 mg many months ago doing well    Encounter for long-term (current) use of medications        Encounter to discuss test results        All labs reviewed from last week note sodium 125    Medication management        Chronic bilateral low back pain without sciatica        Would like to resume gabapentin I have ordered 100 mg 3 times daily to start once daily and escalate    Relevant Medications    gabapentin (Neurontin) 100 mg capsule          Depression Screening and Follow-up Plan: Patient was screened for depression during today's encounter. They screened negative with a PHQ-2 score of 0. Preventive health issues were discussed with patient, and age appropriate screening tests were ordered as noted in patient's After Visit Summary. Personalized health advice and appropriate referrals for health education or preventive services given if needed, as noted in patient's After Visit Summary.      History of Present Illness:     Patient presents for a Medicare Wellness Visit    HPI   Patient Care Team:  Laureen Goldberg, DO as PCP - General (Family Medicine)  Bea Pitt MD as PCP - [facility] SILVANO Connell (RTE)     Review of Systems:     Review of Systems     Problem List:     Patient Active Problem List   Diagnosis   • Myelomalacia [facility])   • Other spondylosis with myelopathy, cervical region   • Hyponatremia   • Acute blood loss anemia   • Benign essential HTN   • Constipation   • History of cervical spinal surgery   • Discitis of lumbar region   • Asymptomatic microscopic hematuria   • Rib fractures   • Rib pain on left side   • Atrial fibrillation (HCC)   • Medication refill   • Gastroesophageal reflux disease without esophagitis   • Lumbar radiculopathy   • Mild protein-calorie malnutrition ([address])      Past Medical and Surgical History:     Past Medical History:   Diagnosis Date   • A-fib ([address]) 01/01/2005   • 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 03/10/2017   • Anemia 12/31/2015   • Anxiety    • Back ache 08/31/2017   • Diarrhea 08/31/2017   • Diverticula of colon 01/01/2012   • Family history of prostate cancer     Father   • GERD (gastroesophageal reflux disease)    • GERD with esophagitis 08/31/2017   • Gout 08/31/2017   • Hordeolum externum of lower eyelid 08/31/2017   • Hyperlipidemia 05/01/2017   • Hypertension, essential 05/01/2017   • Hyponatremia 07/10/2014   • Loss of taste 05/01/2017   • Muscle strain 08/31/2017   • Osteoarthritis    • Paresthesia 06/08/2018   • Raised prostate specific antigen 05/01/2017   • Skin lesion 03/08/2018   • Spinal stenosis of lumbar region 02/06/2017   • Steatosis of liver 08/31/2017   • Weight loss, unintentional 08/31/2017     Past Surgical History:   Procedure Laterality Date   • BACK SURGERY     • CERVICAL SPINE SURGERY  08/2018    posterior cervical decompression/laminectomy C3-5, superior 6, fixation/fusion C2-T1   • CHEST TUBE INSERTION  1980    Insertion and removal / collapsed lung   • COLONOSCOPY  11/2018    5 yrs   • OTHER SURGICAL HISTORY  03/10/2017    Abscess removed from L5 & S1   • DC ARTHRD PST/PSTLAT TQ 1NTRSPC CRV BELW C2 SEGMENT N/A 8/16/2018    Procedure: Posterior cervical decompression/laminectomy C3-5, superior 6, fixation fusion C2- T1;  Surgeon: Jenni Knutson MD;  Location: [facility];  Service: Neurosurgery   • W[place] RPR 1ST INGUN HRNA AGE 7 YRS/> REDUCIBLE Left 2021    Procedure: REPAIR HERNIA INGUINAL with mesh;  Surgeon: Nithya Montoya MD;  Location:  MAIN OR;  Service: General      Family History:     Family History   Problem Relation Age of Onset   • Prostate cancer Father    • Kidney disease Father    • Pancreatic cancer Mother       Social History:     Social History     Socioeconomic History   • Marital status: Single     Spouse name: None   • Number of children: 0   • Years of education: HS Grad   • Highest education level: High school graduate   Occupational History   • Occupation: Maintenance     Comment: [employer]   Tobacco Use   • Smoking status: Former     Types: Cigarettes     Quit date:      Years since quittin.8   • Smokeless tobacco: Never   Vaping Use   • Vaping Use: Never used   Substance and Sexual Activity   • Alcohol use: Yes     Alcohol/week: 5.0 standard drinks of alcohol     Types: 5 Cans of beer per week     Comment: Beer/ 5 daily   • Drug use: No   • Sexual activity: Yes   Other Topics Concern   • None   Social History Narrative    Lives at home alone    · Most recent tobacco use screenin2019      · Do you currently or have you served in the [address] [place] [address] [employer]:   No      Social Determinants of Health     Financial Resource Strain: Low Risk  (10/30/2023)    Overall Financial Resource Strain (CARDIA)    • Difficulty of Paying Living Expenses: Not hard at all   Food Insecurity: No Food Insecurity (3/16/2021)    Hunger Vital Sign    • Worried About Running Out of Food in the Last Year: Never true    • Ran Out of Food in the Last Year: Never true   Transportation Needs: No Transportation Needs (10/30/2023)    PRAPARE - Transportation    • Lack of Transportation (Medical): No    • Lack of Transportation (Non-Medical):  No   Physical Activity: Sufficiently Active (2020)    Exercise Vital Sign    • Days of Exercise per Week: 3 days    • Minutes of Exercise per Session: 90 min   Stress: No Stress Concern Present (5/13/2020)    [address] [facility]    • Feeling of Stress : Only a little   Social Connections: Socially Isolated (5/13/2020)    Social Connection and Isolation Panel [NHANES]    • Frequency of Communication with Friends and Family: More than three times a week    • Frequency of Social Gatherings with Friends and Family: More than three times a week    • Attends Gnosticism Services: Never    • Active Member of Clubs or Organizations: No    • Attends Club or Organization Meetings: Never    • Marital Status: Never    Intimate Partner Violence: Not on file   Housing Stability: Not on file      Medications and Allergies:     Current Outpatient Medications   Medication Sig Dispense Refill   • amLODIPine (NORVASC) 5 mg tablet Take 1 tablet (5 mg total) by mouth daily 90 tablet 3   • aspirin 325 mg tablet Take 325 mg by mouth every other day as needed for mild pain     • gabapentin (Neurontin) 100 mg capsule Take 1 capsule (100 mg total) by mouth 3 (three) times a day 30 capsule 3   • ibuprofen (MOTRIN) 600 mg tablet Take 1 tablet (600 mg total) by mouth every 6 (six) hours as needed for mild pain or moderate pain 30 tablet 2   • losartan (COZAAR) 100 MG tablet Take 1 tablet (100 mg total) by mouth daily 90 tablet 3   • omeprazole (PriLOSEC) 20 mg delayed release capsule TAKE 1 CAPSULE (20 MG TOTAL) BY MOUTH AS NEEDED (GERD) 90 capsule 1   • sodium chloride 1 g tablet Take 1 tablet (1 g total) by mouth 3 (three) times a day 90 tablet 6   • sotalol (BETAPACE) 120 mg tablet Take 1 tablet (120 mg total) by mouth 2 (two) times a day 180 tablet 3     No current facility-administered medications for this visit.      No Known Allergies   Immunizations:     Immunization History   Administered Date(s) Administered   • Tuberculin Skin Test 08/23/2018, 09/01/2018      Health Maintenance: Topic Date Due   • Colorectal Cancer Screening  11/01/2028   • Hepatitis C Screening  Completed         Topic Date Due   • COVID-19 Vaccine (1) Never done   • Pneumococcal Vaccine: 65+ Years (1 - PCV) Never done   • Influenza Vaccine (1) Never done      Medicare Screening Tests and Risk Assessments:     Suzanne Joe is here for his Subsequent Wellness visit. Health Risk Assessment:   Patient rates overall health as good. Patient feels that their physical health rating is same. Patient is satisfied with their life. Eyesight was rated as same. Hearing was rated as same. Patient feels that their emotional and mental health rating is same. Patients states they are never, rarely angry. Patient states they are sometimes unusually tired/fatigued. Pain experienced in the last 7 days has been some. Patient's pain rating has been 5/10. Patient states that he has experienced no weight loss or gain in last 6 months. Depression Screening:   PHQ-2 Score: 0      Fall Risk Screening: In the past year, patient has experienced: history of falling in past year    Number of falls: 2 or more  Injured during fall?: No    Feels unsteady when standing or walking?: Yes    Worried about falling?: Yes      Home Safety:  Patient does not have trouble with stairs inside or outside of their home. Patient has working smoke alarms and has no working carbon monoxide detector. Home safety hazards include: none. Nutrition:   Current diet is Regular. Medications:   Patient is not currently taking any over-the-counter supplements. Patient is able to manage medications. Activities of Daily Living (ADLs)/Instrumental Activities of Daily Living (IADLs):   Walk and transfer into and out of bed and chair?: Yes  Dress and groom yourself?: Yes    Bathe or shower yourself?: Yes    Feed yourself?  Yes  Do your laundry/housekeeping?: Yes  Manage your money, pay your bills and track your expenses?: Yes  Make your own meals?: Yes    Do your own shopping?: Yes    Previous Hospitalizations:   Any hospitalizations or ED visits within the last 12 months?: No      Advance Care Planning:   Living will: No    Durable POA for healthcare: No    Advanced directive: No    Advanced directive counseling given: Yes    ACP document given: Yes    Patient declined ACP directive: No    End of Life Decisions reviewed with patient: Yes    Provider agrees with end of life decisions: Yes      Cognitive Screening:   Provider or family/friend/caregiver concerned regarding cognition?: No    PREVENTIVE SCREENINGS      Cardiovascular Screening:    General: Screening Current and Risks and Benefits Discussed      Diabetes Screening:     General: Screening Current and Risks and Benefits Discussed      Colorectal Cancer Screening:     General: Screening Current and Risks and Benefits Discussed      Prostate Cancer Screening:    General: Screening Current and Risks and Benefits Discussed      Osteoporosis Screening:    General: Risks and Benefits Discussed      Abdominal Aortic Aneurysm (AAA) Screening:    Risk factors include: age between 70-75 yo and tobacco use        General: Risks and Benefits Discussed      Lung Cancer Screening:     General: Screening Not Indicated and Risks and Benefits Discussed      Hepatitis C Screening:    General: Screening Current    Hep C Screening Accepted: No     Screening, Brief Intervention, and Referral to Treatment (SBIRT)    Screening  Typical number of drinks in a day: 0  Typical number of drinks in a week: 7  Interpretation: Low risk drinking behavior. Single Item Drug Screening:  How often have you used an illegal drug (including marijuana) or a prescription medication for non-medical reasons in the past year? never    Single Item Drug Screen Score: 0  Interpretation: Negative screen for possible drug use disorder    Brief Intervention  Alcohol & drug use screenings were reviewed. No concerns regarding substance use disorder identified. Healthy alcohol use/limits discussed. Other Counseling Topics:   Car/seat belt/driving safety, skin self-exam, sunscreen and calcium and vitamin D intake and regular weightbearing exercise. No results found.      Physical Exam:     /82 (BP Location: Left arm, Patient Position: Sitting, Cuff Size: Standard)   Pulse 65   Temp 98.4 °F (36.9 °C) (Temporal)   Resp 16   Ht 5' 10" (1.778 m)   Wt 57.2 kg (126 lb)   SpO2 99%   BMI 18.08 kg/m²     Physical Exam -pls see above note    Clemetine Days, DO

## 2023-11-06 ENCOUNTER — TELEPHONE (OUTPATIENT)
Age: 68
End: 2023-11-06

## 2023-11-06 NOTE — TELEPHONE ENCOUNTER
Patient is calling in regards to his Sodium Chloride Tablets, pharmacy told patient they don't have the script, he asked us to follow up with pharmacy. Receipt confirmed by pharmacy (10/30/2023 12:25 PM EDT     Please advise.

## 2023-11-07 NOTE — TELEPHONE ENCOUNTER
Called patients pharmacy and they stated that they are still working on the prescription. Patient will need to contact them to find out when it will be done and ready for .

## 2023-11-07 NOTE — TELEPHONE ENCOUNTER
Called and spoke with patient and informed that I called the pharmacy and confirmed that they do have the script but are still working on it. It will be the patients responsibility to call and check when it is done if pharmacy doesn't give courtesy calls.

## 2023-11-09 NOTE — TELEPHONE ENCOUNTER
Called pt and no answer so Left voicemail informing that salt tablets are ready for pickup. Advised to call back if there are any additional concerns.

## 2024-04-30 ENCOUNTER — OFFICE VISIT (OUTPATIENT)
Dept: FAMILY MEDICINE CLINIC | Facility: CLINIC | Age: 69
End: 2024-04-30
Payer: MEDICARE

## 2024-04-30 VITALS
SYSTOLIC BLOOD PRESSURE: 140 MMHG | TEMPERATURE: 97.5 F | HEIGHT: 70 IN | BODY MASS INDEX: 17.84 KG/M2 | DIASTOLIC BLOOD PRESSURE: 78 MMHG | HEART RATE: 61 BPM | RESPIRATION RATE: 17 BRPM | WEIGHT: 124.6 LBS | OXYGEN SATURATION: 97 %

## 2024-04-30 DIAGNOSIS — M54.50 CHRONIC BILATERAL LOW BACK PAIN WITHOUT SCIATICA: ICD-10-CM

## 2024-04-30 DIAGNOSIS — E87.1 HYPONATREMIA: ICD-10-CM

## 2024-04-30 DIAGNOSIS — Z12.5 SCREENING FOR PROSTATE CANCER: ICD-10-CM

## 2024-04-30 DIAGNOSIS — M10.00 IDIOPATHIC GOUT, UNSPECIFIED CHRONICITY, UNSPECIFIED SITE: ICD-10-CM

## 2024-04-30 DIAGNOSIS — I48.91 ATRIAL FIBRILLATION, UNSPECIFIED TYPE (HCC): ICD-10-CM

## 2024-04-30 DIAGNOSIS — E55.9 VITAMIN D INSUFFICIENCY: Primary | ICD-10-CM

## 2024-04-30 DIAGNOSIS — G95.89 MYELOMALACIA (HCC): ICD-10-CM

## 2024-04-30 DIAGNOSIS — G89.29 CHRONIC BILATERAL LOW BACK PAIN WITHOUT SCIATICA: ICD-10-CM

## 2024-04-30 DIAGNOSIS — R73.9 ELEVATED BLOOD SUGAR: ICD-10-CM

## 2024-04-30 DIAGNOSIS — E44.1 MILD PROTEIN-CALORIE MALNUTRITION (HCC): ICD-10-CM

## 2024-04-30 PROCEDURE — G2211 COMPLEX E/M VISIT ADD ON: HCPCS | Performed by: FAMILY MEDICINE

## 2024-04-30 PROCEDURE — 99214 OFFICE O/P EST MOD 30 MIN: CPT | Performed by: FAMILY MEDICINE

## 2024-04-30 RX ORDER — SODIUM CHLORIDE 1 G/1
1 TABLET ORAL 3 TIMES DAILY
Qty: 270 TABLET | Refills: 3 | Status: SHIPPED | OUTPATIENT
Start: 2024-04-30

## 2024-04-30 RX ORDER — GABAPENTIN 100 MG/1
100 CAPSULE ORAL 3 TIMES DAILY
Qty: 30 CAPSULE | Refills: 3 | Status: CANCELLED | OUTPATIENT
Start: 2024-04-30

## 2024-04-30 NOTE — PATIENT INSTRUCTIONS
Better healthcare is in your genes. Learn more about a community health research program at [facility]     Who is eligible to join?  You are eligible to participate if you are 18 years or older at the time of enrollment. You are not eligible to participate if you are a recipient of a donor bone marrow or a stem cell transplant.     Is there a cost to participate?  There is no cost to participate and health insurance is not required.    What can I learn about in this study?  You can learn about inherited risks for:                                              Common cancers: hereditary breast and ovarian cancer, and                                colorectal cancer related to Siddiqui syndrome                              Heart disease: hereditary high cholesterol (also known as                                familial hypercholesterolemia)                             * You also have the opportunity to learn about your ancestry and                                other traits like, caffeine sensitivity, sleep patterns and more    How can I sign up? Go to [email or web address] or scan the QR Code to sign up.

## 2024-04-30 NOTE — ASSESSMENT & PLAN NOTE
Malnutrition Findings:   Wt 124 lbs and uses Ensure-Plus o Boost Plus                                BMI Findings:           Body mass index is 17.88 kg/m².

## 2024-04-30 NOTE — PROGRESS NOTES
Name: Bishnu Nguyen      : 1955      MRN: [id number]  Encounter Provider: SACHIN Gautam DO  Encounter Date: 2024   Encounter department: [facility]    Assessment & Plan     Assessment & Plan  The patient is a 68-year-old, lives alone, goes to work at 2 AM at the [employer] and leaves at 7 AM. He is seen for the following issues.    1. Hypertension.  The patient's blood pressure is well-managed with amlodipine and losartan, currently at 140/78.    2. Atrial fibrillation.  The patient has experienced atrial fibrillation for several years, which has been well-managed with sotalol, prescribed by Dr. Lr approximately 20 years ago. Since then, he has been on sotalol 120 mg twice daily, which has been effective.    3. Neuropathy, particularly in the lower extremities.  This condition originates from his back, as evidenced by two previous surgeries on the cervical spine and lumbar spine. . The patient was previously on gabapentin 100 mg thrice daily, but has discontinued its use due to lack of noticeable improvement. The patient will inform me if he decides to increase the dosage.    4. Hyponatremia.  The patient's last sodium level was recorded at 125. A repeat sodium level test is recommended, but the patient prefers to postpone it until the fall season. Regular biannual full labs have been ordered.    5. Gout.  The patient has not experienced any recent episodes of gout, with the last one possibly occurring several years ago. He was previously on allopurinol, but has discontinued its use for several years. I have advised the patient on the necessity of vigilant monitoring of his gout to prevent symptoms associated with gout or kidney stones. A uric acid level test will be conducted.     No orders of the defined types were placed in this encounter.      Subjective      History of Present Illness  The patient is a 68-year-old male who presents for evaluation of multiple  medical concerns.    The patient monitors his blood pressure at home, noting a decrease from the previous reading. His current medication regimen includes amlodipine and losartan.    His atrial fibrillation is well-managed with sotalol, which he takes one tablet twice daily. He reports no current cardiac issues. His recollection of a three-day hospital stay during which he was monitored, during which his heart rate returned to normal sinus rhythm around 2 or 3 o'clock.    The patient was prescribed gabapentin 100 mg thrice daily for neuropathy in his lower back, which he has been managing since 2018. However, he has exhausted the supply of the medication and has not received a follow-up call. He typically takes 100 mg at bedtime. He has undergone two lumbar spine surgeries, which cause daily pain, but it is manageable.    He consumes Boost Plus for nutritional intake. His diet consists of two meals per day.    He reports no recent episodes of gout.    Supplemental Information  He takes omeprazole 20 mg about twice a week, usually every day and Friday.   He lives alone in his parents' house. He works at [employer] at 2 AM and leaves at 7 AM.   Both of his parents are . His father had dementia. His 70-year-old brother has atrial fibrillation and is wearing a defibrillator. He denies any family history of realistical diseases.  Review of Systems   Constitutional:  Negative for activity change, appetite change, chills, diaphoresis, fatigue, fever and unexpected weight change.   HENT:  Negative for congestion, dental problem, drooling, ear discharge, ear pain, facial swelling, mouth sores, nosebleeds, postnasal drip, rhinorrhea, trouble swallowing and voice change.    Eyes:  Negative for photophobia, pain, discharge, redness, itching and visual disturbance.   Respiratory:  Negative for apnea, cough, choking, chest tightness and shortness of breath.         Denies any and all respiratory issues - SOB,  "orthopnea, etc.   Cardiovascular:  Negative for chest pain and leg swelling.        Denies any and all cardiac symptoms now, but in 2008 Dr. Lr put on Sotalol 120 mg BID for a fib..  No issue since then.    Gastrointestinal:  Negative for abdominal distention, abdominal pain, constipation, diarrhea and nausea.   Endocrine: Negative for polydipsia, polyphagia and polyuria.   Genitourinary:  Negative for decreased urine volume, difficulty urinating, dysuria, enuresis and hematuria.   Musculoskeletal:  Negative for arthralgias, gait problem and joint swelling.        R butt pain and back pain, onset 3 wks, and shoveled snow.  Had surg in 2017 for lumbar abcess.    Skin:  Negative for color change, pallor, rash and wound.   Allergic/Immunologic: Negative for immunocompromised state.   Neurological:  Negative for dizziness, seizures, syncope, facial asymmetry, speech difficulty, light-headedness and headaches.   Hematological:  Negative for adenopathy.   Psychiatric/Behavioral:  Negative for agitation, behavioral problems, confusion and decreased concentration.          Objective     /78 (BP Location: Left arm, Patient Position: Sitting, Cuff Size: Standard)   Pulse 61   Temp 97.5 °F (36.4 °C) (Temporal)   Resp 17   Ht 5' 10\" (1.778 m)   Wt 56.5 kg (124 lb 9.6 oz)   SpO2 97%   BMI 17.88 kg/m²     Physical Exam  Vital Signs  The patient's weight is 124 pounds.  Physical Exam  Vitals and nursing note reviewed.   Constitutional:       General: He is not in acute distress.     Appearance: Normal appearance. He is well-developed and normal weight. He is not ill-appearing or diaphoretic.   HENT:      Head: Normocephalic and atraumatic.      Nose: Nose normal.   Eyes:      Pupils: Pupils are equal, round, and reactive to light.   Neck:      Trachea: No tracheal deviation.   Cardiovascular:      Rate and Rhythm: Normal rate and regular rhythm.      Heart sounds: Normal heart sounds.   Pulmonary:      Effort: " Pulmonary effort is normal. No respiratory distress.      Breath sounds: Normal breath sounds. No wheezing or rhonchi.   Abdominal:      General: Bowel sounds are normal.      Palpations: Abdomen is soft.      Hernia: No hernia (had repaired on Jan 28, 2021- Dr. Caldera. ) is present.   Musculoskeletal:         General: Normal range of motion.      Cervical back: Normal range of motion and neck supple. No rigidity. No muscular tenderness.   Lymphadenopathy:      Cervical: No cervical adenopathy.   Skin:     General: Skin is warm and dry.      Coloration: Skin is not jaundiced or pale.      Findings: No erythema or rash.   Neurological:      General: No focal deficit present.      Mental Status: He is alert and oriented to person, place, and time.   Psychiatric:         Mood and Affect: Mood normal.         Behavior: Behavior normal.         Thought Content: Thought content normal.         Judgment: Judgment normal.       I personally reviewed the recent (and prior)  lab results, the image studies, pathology, other specialty/physicians consult notes and recommendations, and outside medical records from other institutions, as appropriate. I had a lengthy discussion with the patient and shared the work-up findings. We discussed the diagnosis and management plan.  I spent appropriate time with the medical record and patient,  commensurate with the level of service reviewing the records (labs, clinician notes, outside records, medical history, ordering medicine/tests/procedures, interpreting the imaging/labs previously done) and coordination of care as well as direct time with the patient today, of which greater than 50% of the time was spent in counseling and coordination of care with the patient/family.

## 2024-10-25 ENCOUNTER — APPOINTMENT (OUTPATIENT)
Dept: LAB | Facility: HOSPITAL | Age: 69
End: 2024-10-25
Payer: MEDICARE

## 2024-10-25 DIAGNOSIS — E55.9 VITAMIN D INSUFFICIENCY: ICD-10-CM

## 2024-10-25 DIAGNOSIS — E87.1 HYPONATREMIA: ICD-10-CM

## 2024-10-25 DIAGNOSIS — R73.9 ELEVATED BLOOD SUGAR: ICD-10-CM

## 2024-10-25 DIAGNOSIS — Z12.5 SCREENING FOR PROSTATE CANCER: ICD-10-CM

## 2024-10-25 DIAGNOSIS — G95.89 MYELOMALACIA (HCC): ICD-10-CM

## 2024-10-25 DIAGNOSIS — M10.00 IDIOPATHIC GOUT, UNSPECIFIED CHRONICITY, UNSPECIFIED SITE: ICD-10-CM

## 2024-10-25 DIAGNOSIS — E44.1 MILD PROTEIN-CALORIE MALNUTRITION (HCC): ICD-10-CM

## 2024-10-25 LAB
25(OH)D3 SERPL-MCNC: 82.7 NG/ML (ref 30–100)
ALBUMIN SERPL BCG-MCNC: 4 G/DL (ref 3.5–5)
ALP SERPL-CCNC: 88 U/L (ref 34–104)
ALT SERPL W P-5'-P-CCNC: 18 U/L (ref 7–52)
ANION GAP SERPL CALCULATED.3IONS-SCNC: 7 MMOL/L (ref 4–13)
AST SERPL W P-5'-P-CCNC: 30 U/L (ref 13–39)
BILIRUB SERPL-MCNC: 0.99 MG/DL (ref 0.2–1)
BILIRUB UR QL STRIP: NEGATIVE
BUN SERPL-MCNC: 6 MG/DL (ref 5–25)
CALCIUM SERPL-MCNC: 9.2 MG/DL (ref 8.4–10.2)
CHLORIDE SERPL-SCNC: 93 MMOL/L (ref 96–108)
CHOLEST SERPL-MCNC: 147 MG/DL
CLARITY UR: CLEAR
CO2 SERPL-SCNC: 27 MMOL/L (ref 21–32)
COLOR UR: YELLOW
CREAT SERPL-MCNC: 0.66 MG/DL (ref 0.6–1.3)
CREAT UR-MCNC: 103.9 MG/DL
ERYTHROCYTE [DISTWIDTH] IN BLOOD BY AUTOMATED COUNT: 12 % (ref 11.6–15.1)
EST. AVERAGE GLUCOSE BLD GHB EST-MCNC: 88 MG/DL
GFR SERPL CREATININE-BSD FRML MDRD: 100 ML/MIN/1.73SQ M
GLUCOSE P FAST SERPL-MCNC: 96 MG/DL (ref 65–99)
GLUCOSE UR STRIP-MCNC: NEGATIVE MG/DL
HBA1C MFR BLD: 4.7 %
HCT VFR BLD AUTO: 37.9 % (ref 36.5–49.3)
HDLC SERPL-MCNC: 71 MG/DL
HGB BLD-MCNC: 13.1 G/DL (ref 12–17)
HGB UR QL STRIP.AUTO: NEGATIVE
KETONES UR STRIP-MCNC: NEGATIVE MG/DL
LDLC SERPL CALC-MCNC: 63 MG/DL (ref 0–100)
LEUKOCYTE ESTERASE UR QL STRIP: NEGATIVE
MCH RBC QN AUTO: 34 PG (ref 26.8–34.3)
MCHC RBC AUTO-ENTMCNC: 34.6 G/DL (ref 31.4–37.4)
MCV RBC AUTO: 98 FL (ref 82–98)
MICROALBUMIN UR-MCNC: 15.7 MG/L
MICROALBUMIN/CREAT 24H UR: 15 MG/G CREATININE (ref 0–30)
NITRITE UR QL STRIP: NEGATIVE
NONHDLC SERPL-MCNC: 76 MG/DL
PH UR STRIP.AUTO: 7 [PH]
PLATELET # BLD AUTO: 176 THOUSANDS/UL (ref 149–390)
PMV BLD AUTO: 9.8 FL (ref 8.9–12.7)
POTASSIUM SERPL-SCNC: 4.2 MMOL/L (ref 3.5–5.3)
PROT SERPL-MCNC: 6.9 G/DL (ref 6.4–8.4)
PROT UR STRIP-MCNC: NEGATIVE MG/DL
PSA SERPL-MCNC: 3.09 NG/ML (ref 0–4)
RBC # BLD AUTO: 3.85 MILLION/UL (ref 3.88–5.62)
SODIUM SERPL-SCNC: 127 MMOL/L (ref 135–147)
SP GR UR STRIP.AUTO: 1.02 (ref 1–1.03)
TRIGL SERPL-MCNC: 63 MG/DL
TSH SERPL DL<=0.05 MIU/L-ACNC: 1.07 UIU/ML (ref 0.45–4.5)
URATE SERPL-MCNC: 3.4 MG/DL (ref 3.5–8.5)
UROBILINOGEN UR QL STRIP.AUTO: 0.2 E.U./DL
WBC # BLD AUTO: 5.43 THOUSAND/UL (ref 4.31–10.16)

## 2024-10-25 PROCEDURE — 84550 ASSAY OF BLOOD/URIC ACID: CPT

## 2024-10-25 PROCEDURE — 82306 VITAMIN D 25 HYDROXY: CPT

## 2024-10-25 PROCEDURE — 80053 COMPREHEN METABOLIC PANEL: CPT

## 2024-10-25 PROCEDURE — 84443 ASSAY THYROID STIM HORMONE: CPT

## 2024-10-25 PROCEDURE — 85027 COMPLETE CBC AUTOMATED: CPT

## 2024-10-25 PROCEDURE — G0103 PSA SCREENING: HCPCS

## 2024-10-25 PROCEDURE — 36415 COLL VENOUS BLD VENIPUNCTURE: CPT

## 2024-10-25 PROCEDURE — 80061 LIPID PANEL: CPT

## 2024-10-25 PROCEDURE — 83036 HEMOGLOBIN GLYCOSYLATED A1C: CPT

## 2024-10-30 ENCOUNTER — OFFICE VISIT (OUTPATIENT)
Dept: FAMILY MEDICINE CLINIC | Facility: CLINIC | Age: 69
End: 2024-10-30

## 2024-10-30 VITALS
WEIGHT: 122.2 LBS | HEIGHT: 70 IN | SYSTOLIC BLOOD PRESSURE: 130 MMHG | HEART RATE: 62 BPM | OXYGEN SATURATION: 98 % | DIASTOLIC BLOOD PRESSURE: 74 MMHG | TEMPERATURE: 97.6 F | BODY MASS INDEX: 17.49 KG/M2

## 2024-10-30 DIAGNOSIS — G95.89 MYELOMALACIA (HCC): Primary | ICD-10-CM

## 2024-10-30 DIAGNOSIS — Z76.0 MEDICATION REFILL: ICD-10-CM

## 2024-10-30 DIAGNOSIS — I10 BENIGN ESSENTIAL HTN: ICD-10-CM

## 2024-10-30 DIAGNOSIS — Z98.890 HISTORY OF CERVICAL SPINAL SURGERY: ICD-10-CM

## 2024-10-30 DIAGNOSIS — E44.1 MILD PROTEIN-CALORIE MALNUTRITION (HCC): ICD-10-CM

## 2024-10-30 DIAGNOSIS — E87.1 HYPONATREMIA: ICD-10-CM

## 2024-10-30 DIAGNOSIS — I48.91 ATRIAL FIBRILLATION, UNSPECIFIED TYPE (HCC): ICD-10-CM

## 2024-10-30 DIAGNOSIS — Z13.6 SCREENING FOR AAA (ABDOMINAL AORTIC ANEURYSM): ICD-10-CM

## 2024-10-30 RX ORDER — AMLODIPINE BESYLATE 5 MG/1
5 TABLET ORAL DAILY
Qty: 90 TABLET | Refills: 3 | Status: SHIPPED | OUTPATIENT
Start: 2024-10-30 | End: 2025-10-30

## 2024-10-30 RX ORDER — SOTALOL HYDROCHLORIDE 120 MG/1
120 TABLET ORAL 2 TIMES DAILY
Qty: 180 TABLET | Refills: 3 | Status: SHIPPED | OUTPATIENT
Start: 2024-10-30 | End: 2025-10-30

## 2024-10-30 RX ORDER — LOSARTAN POTASSIUM 100 MG/1
100 TABLET ORAL DAILY
Qty: 90 TABLET | Refills: 3 | Status: SHIPPED | OUTPATIENT
Start: 2024-10-30 | End: 2025-10-30

## 2024-10-30 NOTE — ASSESSMENT & PLAN NOTE
Orders:    amLODIPine (NORVASC) 5 mg tablet; Take 1 tablet (5 mg total) by mouth daily    losartan (COZAAR) 100 MG tablet; Take 1 tablet (100 mg total) by mouth daily

## 2024-10-30 NOTE — ASSESSMENT & PLAN NOTE
the blood pressure 130/74 doing well on amlodipine and losartan  Orders:    amLODIPine (NORVASC) 5 mg tablet; Take 1 tablet (5 mg total) by mouth daily    losartan (COZAAR) 100 MG tablet; Take 1 tablet (100 mg total) by mouth daily

## 2024-10-30 NOTE — ASSESSMENT & PLAN NOTE
Continues on sotalol 120 mg twice daily and is asymptomatic without palpitation shortness of breath except  Orders:    sotalol (BETAPACE) 120 mg tablet; Take 1 tablet (120 mg total) by mouth 2 (two) times a day

## 2024-10-30 NOTE — ASSESSMENT & PLAN NOTE
Had cervical spine stenosis for which neurosurgeon Dr. Guzman operated in 2018 August which gave him some relief

## 2024-10-30 NOTE — ASSESSMENT & PLAN NOTE
Orders:    sotalol (BETAPACE) 120 mg tablet; Take 1 tablet (120 mg total) by mouth 2 (two) times a day

## 2024-10-30 NOTE — PROGRESS NOTES
Ambulatory Visit  Name: Bishnu Nguyen      : 1955      MRN: [id number]  Encounter Provider: SACHIN Gautam DO  Encounter Date: 10/30/2024   Encounter department: [facility] PRIMARY CARE [place]    Assessment & Plan  Benign essential HTN    Orders:    amLODIPine (NORVASC) 5 mg tablet; Take 1 tablet (5 mg total) by mouth daily    losartan (COZAAR) 100 MG tablet; Take 1 tablet (100 mg total) by mouth daily    Benign essential HTN  the blood pressure 130/74 doing well on amlodipine and losartan  Orders:    amLODIPine (NORVASC) 5 mg tablet; Take 1 tablet (5 mg total) by mouth daily    losartan (COZAAR) 100 MG tablet; Take 1 tablet (100 mg total) by mouth daily    Atrial fibrillation, unspecified type (HCC)  Continues on sotalol 120 mg twice daily and is asymptomatic without palpitation shortness of breath except  Orders:    sotalol (BETAPACE) 120 mg tablet; Take 1 tablet (120 mg total) by mouth 2 (two) times a day    Myelomalacia (HCC)  Had cervical spine stenosis for which neurosurgeon Dr. Guzman operated in 2018 which gave him some relief       History of cervical spinal surgery  By Dr. Guzman of the neurosurgery department in 2018       Hyponatremia  Persistent  Na in the low 120's -go back on sodium chloride tablets at least twice daily prescriptions written for 3 times daily so       Medication refill  All pertinent medications refilled-Norvasc losartan sotalol and sodium chloride       Mild protein-calorie malnutrition (HCC)  Sister makes him meals and comes up from [place] q wk       Screening for AAA (abdominal aortic aneurysm)    Orders:    US abdominal aorta screening aaa; Future       Preventive health issues were discussed with patient, and age appropriate screening tests were ordered as noted in patient's After Visit Summary. Personalized health advice and appropriate referrals for health education or preventive services given if needed, as noted in patient's After Visit  Summary.    History of Present Illness     HPI   Patient Care Team:  SACHIN Gautam DO as PCP - General (Family Medicine)  Moustapha Cleaning MD as PCP - PCP-[facility] ([facility])    Review of Systems  Medical History Reviewed by provider this encounter:       Annual Wellness Visit Questionnaire   Bishnu is here for his Subsequent Wellness visit. Last Medicare Wellness visit information reviewed, patient interviewed and updates made to the record today.      Health Risk Assessment:   Patient rates overall health as good. Patient feels that their physical health rating is same. Patient is satisfied with their life. Eyesight was rated as same. Hearing was rated as same. Patient feels that their emotional and mental health rating is same. Patients states they are never, rarely angry. Patient states they are never, rarely unusually tired/fatigued. Pain experienced in the last 7 days has been some. Patient's pain rating has been 6/10. Patient states that he has experienced no weight loss or gain in last 6 months. Chronic LBP -- has had surgery in the past     Depression Screening:   PHQ-2 Score: 0      Fall Risk Screening:   In the past year, patient has experienced: no history of falling in past year      Home Safety:  Patient does not have trouble with stairs inside or outside of their home. Patient has working smoke alarms and has no working carbon monoxide detector. Home safety hazards include: none.     Nutrition:   Current diet is Regular, Other (please comment) and Limited junk food. Pt has regular protein intake, as well as lots of fruits and vegetables.     Drinks Yeungling lagar and drinks 4 a day. Occas a wine-pinot noir    Medications:   Patient is currently taking over-the-counter supplements. OTC medications include: see medication list. Patient is able to manage medications.     Activities of Daily Living (ADLs)/Instrumental Activities of Daily Living (IADLs):   Walk and transfer into and out of bed  and chair?: Yes  Dress and groom yourself?: Yes    Bathe or shower yourself?: Yes    Feed yourself? Yes  Do your laundry/housekeeping?: Yes  Manage your money, pay your bills and track your expenses?: Yes  Make your own meals?: Yes    Do your own shopping?: Yes    Previous Hospitalizations:   Any hospitalizations or ED visits within the last 12 months?: No      Advance Care Planning:   Living will: No    Durable POA for healthcare: No    Advanced directive: No    Advanced directive counseling given: Yes    ACP document given: Yes    Patient declined ACP directive: No    End of Life Decisions reviewed with patient: Yes    Provider agrees with end of life decisions: Yes      Comments: Has LW at home and will bring in    Cognitive Screening:   Provider or family/friend/caregiver concerned regarding cognition?: No    PREVENTIVE SCREENINGS      Cardiovascular Screening:    General: Screening Current and Risks and Benefits Discussed      Diabetes Screening:     General: Screening Current and Risks and Benefits Discussed      Colorectal Cancer Screening:     General: Screening Current      Prostate Cancer Screening:    General: Screening Current and Risks and Benefits Discussed      Osteoporosis Screening:    General: Risks and Benefits Discussed      Abdominal Aortic Aneurysm (AAA) Screening:    Risk factors include: age between 65-74 yo and tobacco use        General: Risks and Benefits Discussed      Lung Cancer Screening:     General: Screening Not Indicated and Risks and Benefits Discussed      Hepatitis C Screening:    General: Screening Current and Risks and Benefits Discussed    Hep C Screening Accepted: No     Screening, Brief Intervention, and Referral to Treatment (SBIRT)    Screening  Typical number of drinks in a day: 4  Typical number of drinks in a week: 30  Interpretation: Risky drinking behavior.    Single Item Drug Screening:  How often have you used an illegal drug (including marijuana) or a prescription  medication for non-medical reasons in the past year? never    Single Item Drug Screen Score: 0  Interpretation: Negative screen for possible drug use disorder    Brief Intervention  Alcohol & drug use screenings were reviewed. No concerns regarding substance use disorder identified. Healthy alcohol use/limits discussed.     Other Counseling Topics:   Car/seat belt/driving safety, skin self-exam, sunscreen and calcium and vitamin D intake and regular weightbearing exercise.     Social Determinants of Health     Financial Resource Strain: Low Risk  (10/30/2023)    Overall Financial Resource Strain (CARDIA)     Difficulty of Paying Living Expenses: Not hard at all   Food Insecurity: No Food Insecurity (3/16/2021)    Hunger Vital Sign     Worried About Running Out of Food in the Last Year: Never true     Ran Out of Food in the Last Year: Never true   Transportation Needs: No Transportation Needs (10/30/2023)    PRAPARE - Transportation     Lack of Transportation (Medical): No     Lack of Transportation (Non-Medical): No     No results found.    Objective     There were no vitals taken for this visit.    Physical Exam  Administrative Statements   I have spent a total time of 35 minutes in caring for this patient on the day of the visit/encounter including Diagnostic results, Prognosis, Risks and benefits of tx options, Instructions for management, Patient and family education, Importance of tx compliance, Risk factor reductions, Impressions, Counseling / Coordination of care, Documenting in the medical record, Reviewing / ordering tests, medicine, procedures  , and Obtaining or reviewing history  .

## 2024-10-30 NOTE — ASSESSMENT & PLAN NOTE
Persistent  Na in the low 120's -go back on sodium chloride tablets at least twice daily prescriptions written for 3 times daily so

## 2024-10-30 NOTE — PROGRESS NOTES
Ambulatory Visit  Name: Bishnu Nguyen      : 1955      MRN: [id number]  Encounter Provider: SACHIN Gautam DO  Encounter Date: 10/30/2024   Encounter department: [facility] PRIMARY CARE [place]    Assessment & Plan  Benign essential HTN    Orders:    amLODIPine (NORVASC) 5 mg tablet; Take 1 tablet (5 mg total) by mouth daily    losartan (COZAAR) 100 MG tablet; Take 1 tablet (100 mg total) by mouth daily    Benign essential HTN    Orders:    amLODIPine (NORVASC) 5 mg tablet; Take 1 tablet (5 mg total) by mouth daily    losartan (COZAAR) 100 MG tablet; Take 1 tablet (100 mg total) by mouth daily    Atrial fibrillation, unspecified type (HCC)    Orders:    sotalol (BETAPACE) 120 mg tablet; Take 1 tablet (120 mg total) by mouth 2 (two) times a day      Depression Screening and Follow-up Plan: Patient was screened for depression during today's encounter. They screened negative with a PHQ-2 score of 0.        History of Present Illness     History of Present Illness  The patient is a 69-year-old male who presents for a Medicare wellness visit.    He is currently on amlodipine and losartan for blood pressure management, and sotalol, taken twice daily, for atrial fibrillation. He reports no palpitations.    He has discontinued the use of gabapentin and omeprazole. He occasionally takes Motrin and ibuprofen 600 mg for back pain. He has run out of sodium chloride tablets, which he was taking three times a day. He occasionally takes aspirin.    His last blood test was conducted on Friday. He occasionally drinks water and has reduced his soda intake. He is also taking vitamin D supplements.    He has undergone 2 or 3 colonoscopies in the past. He had a tetanus shot in 2018. He underwent neck surgery in 2018 due to a pinched nerve, which involved the fusion of six vertebrae.    He lives alone and consumes three meals a day. He is able to perform daily activities such as transferring in and out of bed, grooming,  shopping, managing finances, and paying bills. He is unsure if he has smoked 100 cigarettes in his lifetime.    SOCIAL HISTORY  He drinks beer.    FAMILY HISTORY  His father  at the age of 86. His mother  at the age of 80 from pancreatic cancer. His brother had Cologuard test.     Review of Systems   Constitutional:  Negative for activity change, appetite change, chills, diaphoresis, fatigue, fever and unexpected weight change.   HENT:  Negative for congestion, dental problem, drooling, ear discharge, ear pain, facial swelling, mouth sores, nosebleeds, postnasal drip, rhinorrhea, trouble swallowing and voice change.    Eyes:  Negative for photophobia, pain, discharge, redness, itching and visual disturbance.   Respiratory:  Negative for apnea, cough, choking, chest tightness and shortness of breath.         Denies any and all respiratory issues - SOB, orthopnea, etc.   Cardiovascular:  Negative for chest pain and leg swelling.        Denies any and all cardiac symptoms now, but in  Dr. Lr put on Sotalol 120 mg BID for a fib..  No issue since then.    Gastrointestinal:  Negative for abdominal distention, abdominal pain, constipation, diarrhea and nausea.   Endocrine: Negative for polydipsia, polyphagia and polyuria.   Genitourinary:  Negative for decreased urine volume, difficulty urinating, dysuria, enuresis and hematuria.   Musculoskeletal:  Negative for arthralgias, gait problem and joint swelling.        R butt pain and back pain, onset 3 wks, and shoveled snow.  Had surg in 2017 for lumbar abcess.    Skin:  Negative for color change, pallor, rash and wound.   Allergic/Immunologic: Negative for immunocompromised state.   Neurological:  Negative for dizziness, seizures, syncope, facial asymmetry, speech difficulty, light-headedness and headaches.   Hematological:  Negative for adenopathy.   Psychiatric/Behavioral:  Negative for agitation, behavioral problems, confusion and decreased concentration.   "    Objective     /74 (BP Location: Left arm, Patient Position: Sitting, Cuff Size: Standard)   Pulse 62   Temp 97.6 °F (36.4 °C) (Temporal)   Ht 5' 10\" (1.778 m)   Wt 55.4 kg (122 lb 3.2 oz)   SpO2 98%   BMI 17.53 kg/m²     Physical Exam  Vital Signs  Pulse rate is 62. Blood pressure is 130/74.  Physical Exam  Vitals and nursing note reviewed.   Constitutional:       General: He is not in acute distress.     Appearance: Normal appearance. He is well-developed. He is not ill-appearing, toxic-appearing or diaphoretic.   HENT:      Head: Normocephalic and atraumatic.      Nose: Nose normal.      Mouth/Throat:      Mouth: Mucous membranes are moist.   Eyes:      Extraocular Movements: Extraocular movements intact.      Conjunctiva/sclera: Conjunctivae normal.      Pupils: Pupils are equal, round, and reactive to light.   Cardiovascular:      Rate and Rhythm: Normal rate and regular rhythm.      Pulses: Normal pulses.      Heart sounds: Normal heart sounds. No murmur heard.     No friction rub.   Pulmonary:      Effort: Pulmonary effort is normal. No respiratory distress.      Breath sounds: Normal breath sounds. No stridor. No wheezing or rhonchi.   Abdominal:      Palpations: Abdomen is soft.      Tenderness: There is no abdominal tenderness.   Musculoskeletal:         General: No swelling.      Cervical back: Neck supple.   Skin:     General: Skin is warm and dry.      Coloration: Skin is not jaundiced or pale.      Findings: No erythema or rash.   Neurological:      General: No focal deficit present.      Mental Status: He is alert and oriented to person, place, and time. Mental status is at baseline.   Psychiatric:         Mood and Affect: Mood normal.         Behavior: Behavior normal.         Thought Content: Thought content normal.         Judgment: Judgment normal.         "

## 2025-04-30 ENCOUNTER — TELEPHONE (OUTPATIENT)
Dept: ADMINISTRATIVE | Facility: OTHER | Age: 70
End: 2025-04-30

## 2025-04-30 NOTE — TELEPHONE ENCOUNTER
04/30/25 12:49 PM    Patient contacted to bring Advance Directive, POLST, or Living Will document to next scheduled pcp visit.[facility] Department left message.    Thank you.  Sejal Landeros MA  [facility]

## 2025-05-01 ENCOUNTER — OFFICE VISIT (OUTPATIENT)
Dept: FAMILY MEDICINE CLINIC | Facility: CLINIC | Age: 70
End: 2025-05-01
Payer: MEDICARE

## 2025-05-01 VITALS
DIASTOLIC BLOOD PRESSURE: 72 MMHG | TEMPERATURE: 98.6 F | HEIGHT: 70 IN | BODY MASS INDEX: 18.21 KG/M2 | HEART RATE: 62 BPM | OXYGEN SATURATION: 98 % | WEIGHT: 127.2 LBS | SYSTOLIC BLOOD PRESSURE: 138 MMHG

## 2025-05-01 DIAGNOSIS — I48.91 ATRIAL FIBRILLATION, UNSPECIFIED TYPE (HCC): ICD-10-CM

## 2025-05-01 DIAGNOSIS — E87.1 HYPONATREMIA: ICD-10-CM

## 2025-05-01 DIAGNOSIS — Z99.89 DEPENDENCE ON CANE: Primary | ICD-10-CM

## 2025-05-01 DIAGNOSIS — M54.16 LUMBAR RADICULOPATHY: ICD-10-CM

## 2025-05-01 DIAGNOSIS — Z91.81 HISTORY OF FALLING: ICD-10-CM

## 2025-05-01 DIAGNOSIS — G95.89 MYELOMALACIA (HCC): ICD-10-CM

## 2025-05-01 DIAGNOSIS — I10 BENIGN ESSENTIAL HTN: ICD-10-CM

## 2025-05-01 PROCEDURE — 99214 OFFICE O/P EST MOD 30 MIN: CPT | Performed by: FAMILY MEDICINE

## 2025-05-01 PROCEDURE — G2211 COMPLEX E/M VISIT ADD ON: HCPCS | Performed by: FAMILY MEDICINE

## 2025-05-01 RX ORDER — ASPIRIN 81 MG/1
81 TABLET, CHEWABLE ORAL DAILY
COMMUNITY

## 2025-05-01 NOTE — ASSESSMENT & PLAN NOTE
Is still suffering from chronic low back pain--lumbago last MRI noted in May 2022 as followsMPRESSION:     There are no findings particularly suspicious for discitis as clinically questioned.       Advanced degenerative disc disease noted at L4-5 with Modic type I degenerative marrow signal noted.  Probable synovial facet cyst dorsally in the canal measures 8 x 6 x 5 mm contributing to severe central stenosis with moderate to severe facet   hypertrophy bilaterally.  Right greater than left foraminal narrowing noted.     Mild degenerative changes are seen elsewhere.  Orders:  •  MRI lumbar spine wo contrast; Future

## 2025-05-01 NOTE — PROGRESS NOTES
"Name: Bishnu Nguyen      : 1955      MRN: [id number]  Encounter Provider: SACHIN Gautam DO  Encounter Date: 2025   Encounter department: [facility] PRIMARY CARE [place]  :  Assessment & Plan  Dependence on cane  See HPI       History of falling  No recent falls       Benign essential HTN  Blood pressure 138/72 taking Norvasc 5 losartan 100       Atrial fibrillation, unspecified type (HCC)  Continues on baby aspirin and sotalol       Lumbar radiculopathy  Is still suffering from chronic low back pain--lumbago last MRI noted in May 2022 as followsMPRESSION:     There are no findings particularly suspicious for discitis as clinically questioned.       Advanced degenerative disc disease noted at L4-5 with Modic type I degenerative marrow signal noted.  Probable synovial facet cyst dorsally in the canal measures 8 x 6 x 5 mm contributing to severe central stenosis with moderate to severe facet   hypertrophy bilaterally.  Right greater than left foraminal narrowing noted.     Mild degenerative changes are seen elsewhere.  Orders:  •  MRI lumbar spine wo contrast; Future    Hyponatremia  No recent lab  Orders:  •  Basic metabolic panel; Future    Myelomalacia (HCC)  Needs MRI repeat - last        Follow-up (6 mo f/u visit -- main concern is L spine pain.    Has stopped gabapentin from [facility] and no longer follows w their office. Concerns over cyst in spinal column. )  Assessment & Plan           History of Present Illness   70 yo male with ch LBP of many years.  Has not tried chiropractic yet reports severe scoliosis. Onset since 2016 when \" something went really wrong\" not from lifting or trauma.  He had the back surgery in 2017-- Dr. Vasques (not here now) Walking with cane \"for security reasons\" for long distances The surgery lasted 8 mos and then \"swnet down hill\".  He had his last MRI in May 2022 impression is as follows:MPRESSION:     There are no findings particularly suspicious for discitis as " clinically questioned.       Advanced degenerative disc disease noted at L4-5 with Modic type I degenerative marrow signal noted.  Probable synovial facet cyst dorsally in the canal measures 8 x 6 x 5 mm contributing to severe central stenosis with moderate to severe facet   hypertrophy bilaterally.  Right greater than left foraminal narrowing noted.     Mild degenerative changes are seen elsewhere.    Discussed with patient discussed repeating MRI in near future    All medications reviewed.  Of not taking sodium chloride can check lab      Review of Systems   Constitutional:  Negative for activity change, appetite change, chills, diaphoresis, fatigue, fever and unexpected weight change.   HENT:  Negative for congestion, dental problem, drooling, ear discharge, ear pain, facial swelling, mouth sores, nosebleeds, postnasal drip, rhinorrhea, trouble swallowing and voice change.    Eyes:  Negative for photophobia, pain, discharge, redness, itching and visual disturbance.   Respiratory:  Negative for apnea, cough, choking, chest tightness and shortness of breath.    Cardiovascular:  Negative for chest pain and leg swelling.   Gastrointestinal:  Negative for abdominal distention, abdominal pain, constipation, diarrhea and nausea.   Endocrine: Negative for polydipsia, polyphagia and polyuria.   Genitourinary:  Negative for decreased urine volume, difficulty urinating, dysuria, enuresis and hematuria.   Musculoskeletal:  Positive for gait problem and myalgias. Negative for arthralgias, back pain and joint swelling.        Ch LBP - last MRI of L Sp in May 2022: MPRESSION:     There are no findings particularly suspicious for discitis as clinically questioned.       Advanced degenerative disc disease noted at L4-5 with Modic type I degenerative marrow signal noted.  Probable synovial facet cyst dorsally in the canal measures 8 x 6 x 5 mm contributing to severe central stenosis with moderate to severe facet   hypertrophy  "bilaterally.  Right greater than left foraminal narrowing noted.     Mild degenerative changes are seen elsewhere.     Skin:  Negative for color change, pallor, rash and wound.   Allergic/Immunologic: Negative for immunocompromised state.   Neurological:  Negative for dizziness, seizures, syncope, facial asymmetry, speech difficulty, light-headedness and headaches.   Hematological:  Negative for adenopathy.   Psychiatric/Behavioral:  Negative for agitation, behavioral problems, confusion and decreased concentration.        Objective   /72 (BP Location: Left arm, Patient Position: Sitting, Cuff Size: Standard)   Pulse 62   Temp 98.6 °F (37 °C) (Temporal)   Ht 5' 10\" (1.778 m)   Wt 57.7 kg (127 lb 3.2 oz)   SpO2 98%   BMI 18.25 kg/m²      Physical Exam  Vitals and nursing note reviewed.   Constitutional:       General: He is not in acute distress.     Appearance: Normal appearance. He is not ill-appearing, toxic-appearing or diaphoretic.   HENT:      Head: Normocephalic and atraumatic.      Nose: Nose normal. No congestion or rhinorrhea.      Mouth/Throat:      Mouth: Mucous membranes are moist.   Eyes:      General: No scleral icterus.     Extraocular Movements: Extraocular movements intact.      Pupils: Pupils are equal, round, and reactive to light.   Cardiovascular:      Rate and Rhythm: Normal rate and regular rhythm.   Pulmonary:      Effort: Pulmonary effort is normal.      Breath sounds: Normal breath sounds.   Abdominal:      General: Abdomen is flat.   Musculoskeletal:      Right lower leg: No edema.      Left lower leg: No edema.   Skin:     General: Skin is warm and dry.      Findings: No lesion or rash.   Neurological:      General: No focal deficit present.      Mental Status: He is alert and oriented to person, place, and time. Mental status is at baseline.   Psychiatric:         Mood and Affect: Mood normal.         Behavior: Behavior normal.         Thought Content: Thought content normal. "         Judgment: Judgment normal.       Administrative Statements   I have spent a total time of 30 minutes in caring for this patient on the day of the visit/encounter including Diagnostic results, Prognosis, Risks and benefits of tx options, Instructions for management, Patient and family education, Importance of tx compliance, Risk factor reductions, Impressions, Counseling / Coordination of care, Documenting in the medical record, Reviewing/placing orders in the medical record (including tests, medications, and/or procedures), and Obtaining or reviewing history  .

## (undated) DEVICE — VIAL DECANTER

## (undated) DEVICE — SURGIFOAM 8.5 X 12.5

## (undated) DEVICE — LIGHT HANDLE COVER SLEEVE DISP BLUE STELLAR

## (undated) DEVICE — PROXIMATE PLUS MD MULTI-DIRECTIONAL RELEASE SKIN STAPLERS CONTAINS 35 STAINLESS STEEL STAPLES APPROXIMATE CLOSED DIMENSIONS: 6.9MM X 3.9MM WIDE: Brand: PROXIMATE

## (undated) DEVICE — BIPOLAR SEALER 23-113-1 AQM 2.3: Brand: AQUAMANTYS™

## (undated) DEVICE — GLOVE SRG BIOGEL ECLIPSE 7

## (undated) DEVICE — 3M™ STERI-STRIP™ BLEND TONE SKIN CLOSURES, B1557, TAN, 1/2 IN X 4 IN (12MM X 100MM), 6 STRIPS/ENVELOPE: Brand: 3M™ STERI-STRIP™

## (undated) DEVICE — TOWEL SURG XR DETECT GREEN STRL RFD

## (undated) DEVICE — SPECIMEN CONTAINER STERILE PEEL PACK

## (undated) DEVICE — BETHLEHEM UNIVERSAL MINOR GEN: Brand: CARDINAL HEALTH

## (undated) DEVICE — PLUMEPEN PRO 10FT

## (undated) DEVICE — BOWL ASSY BM210 DUAL BLADE DISPOSABLE: Brand: MIDAS REX™

## (undated) DEVICE — SILVER-COATED ANTIMICROBIAL BARRIER DRESSING: Brand: ACTICOAT   4" X 8"

## (undated) DEVICE — PREP SURGICAL PURPREP 26ML

## (undated) DEVICE — MAYFIELD® DISPOSABLE ADULT SKULL PIN (PLASTIC BASE): Brand: MAYFIELD®

## (undated) DEVICE — TUBING SUCTION 5MM X 12 FT

## (undated) DEVICE — GAUZE SPONGES,16 PLY: Brand: CURITY

## (undated) DEVICE — NEEDLE 25G X 1 1/2

## (undated) DEVICE — DRAPE SURGIKIT SADDLE BAG

## (undated) DEVICE — 3M™ TEGADERM™ TRANSPARENT FILM DRESSING FRAME STYLE, 1624W, 2-3/8 IN X 2-3/4 IN (6 CM X 7 CM), 100/CT 4CT/CASE: Brand: 3M™ TEGADERM™

## (undated) DEVICE — 3M™ TEGADERM™ TRANSPARENT FILM DRESSING FRAME STYLE, 1626W, 4 IN X 4-3/4 IN (10 CM X 12 CM), 50/CT 4CT/CASE: Brand: 3M™ TEGADERM™

## (undated) DEVICE — NEEDLE 22 G X 1 1/2 SAFETY

## (undated) DEVICE — SUT VICRYL 2-0 REEL 54 IN J286G

## (undated) DEVICE — TOOL 14MH30 LEGEND 14CM 3MM: Brand: MIDAS REX ™

## (undated) DEVICE — SPONGE LAP 18 X 4 IN

## (undated) DEVICE — FLOSEAL MATRIX IS INDICATED IN SURGICAL PROCEDURES (OTHER THAN IN OPHTHALMIC) AS AN ADJUNCT TO HEMOSTASIS WHEN CONTROL OF BLEEDING BY LIGATURE OR CONVENTIONAL PROCEDURES IS INEFFECTIVE OR IMPRACTICAL.: Brand: FLOSEAL HEMOSTATIC MATRIX

## (undated) DEVICE — 3M™ STERI-STRIP™ REINFORCED ADHESIVE SKIN CLOSURES, R1547, 1/2 IN X 4 IN (12 MM X 100 MM), 6 STRIPS/ENVELOPE: Brand: 3M™ STERI-STRIP™

## (undated) DEVICE — INTENDED FOR TISSUE SEPARATION, AND OTHER PROCEDURES THAT REQUIRE A SHARP SURGICAL BLADE TO PUNCTURE OR CUT.: Brand: BARD-PARKER SAFETY BLADES SIZE 15, STERILE

## (undated) DEVICE — SPONGE PVP SCRUB WING STERILE

## (undated) DEVICE — DRAPE SHEET X-LG

## (undated) DEVICE — DRAPE EQUIPMENT RF WAND

## (undated) DEVICE — TOOL 14BA20 LEGEND 14CM 2MM BA: Brand: MIDAS REX ™

## (undated) DEVICE — CHLORAPREP HI-LITE 26ML ORANGE

## (undated) DEVICE — ALL PURPOSE SPONGES,NONWOVEN, 4 PLY: Brand: CURITY

## (undated) DEVICE — MINOR PROCEDURE DRAPE: Brand: CONVERTORS

## (undated) DEVICE — GLOVE SRG BIOGEL ECLIPSE 7.5

## (undated) DEVICE — GLOVE INDICATOR PI UNDERGLOVE SZ 8 BLUE

## (undated) DEVICE — INSTRUMENT 7756010 2.4MM DRILL BIT

## (undated) DEVICE — SUT PROLENE 2-0 SH 30 IN 8833H

## (undated) DEVICE — SNAP KOVER: Brand: UNBRANDED

## (undated) DEVICE — INTENDED FOR TISSUE SEPARATION, AND OTHER PROCEDURES THAT REQUIRE A SHARP SURGICAL BLADE TO PUNCTURE OR CUT.: Brand: BARD-PARKER ® CARBON RIB-BACK BLADES

## (undated) DEVICE — PEANUT 5 PK

## (undated) DEVICE — 3M™ TEGADERM™ TRANSPARENT FILM DRESSING FRAME STYLE, 1628, 6 IN X 8 IN (15 CM X 20 CM), 10/CT 8CT/CASE: Brand: 3M™ TEGADERM™

## (undated) DEVICE — SPONGE 4 X 4 XRAY 16 PLY STRL LF RFD

## (undated) DEVICE — SUT VICRYL 2-0 SH 27 IN UNDYED J417H

## (undated) DEVICE — INTENDED FOR TISSUE SEPARATION, AND OTHER PROCEDURES THAT REQUIRE A SHARP SURGICAL BLADE TO PUNCTURE OR CUT.: Brand: BARD-PARKER SAFETY BLADES SIZE 10, STERILE

## (undated) DEVICE — ELECTRODE BLADE MOD E-Z CLEAN 4IN -0014AM

## (undated) DEVICE — JP PERF DRN SIL FLT 7MM FULL: Brand: CARDINAL HEALTH

## (undated) DEVICE — JACKSON-PRATT 100CC BULB RESERVOIR: Brand: CARDINAL HEALTH

## (undated) DEVICE — MEDI-VAC YANK SUCT HNDL W/TPRD BULBOUS TIP: Brand: CARDINAL HEALTH

## (undated) DEVICE — GLOVE SRG BIOGEL ECLIPSE 8

## (undated) DEVICE — BLADE ELECTRODE: Brand: EDGE

## (undated) DEVICE — SUT MONOCRYL 4-0 PS-2 27 IN Y426H

## (undated) DEVICE — BETADINE OINTMENT FOIL PACK

## (undated) DEVICE — STRL PENROSE DRAIN 18" X 1/2": Brand: CARDINAL HEALTH

## (undated) DEVICE — ANTIBACTERIAL VIOLET BRAIDED (POLYGLACTIN 910), SYNTHETIC ABSORBABLE SUTURE: Brand: COATED VICRYL

## (undated) DEVICE — GAUZE SPONGES,USP TYPE VII GAUZE, 12 PLY: Brand: CURITY

## (undated) DEVICE — TRAY FOLEY 16FR URIMETER SURESTEP

## (undated) DEVICE — BETHLEHEM UNIVERSAL SPINE, KIT: Brand: CARDINAL HEALTH